# Patient Record
Sex: FEMALE | Race: WHITE | NOT HISPANIC OR LATINO | Employment: FULL TIME | URBAN - METROPOLITAN AREA
[De-identification: names, ages, dates, MRNs, and addresses within clinical notes are randomized per-mention and may not be internally consistent; named-entity substitution may affect disease eponyms.]

---

## 2018-12-17 ENCOUNTER — HOSPITAL ENCOUNTER (OUTPATIENT)
Dept: URGENT CARE | Facility: CLINIC | Age: 43
Setting detail: SPECIMEN
Discharge: HOME | End: 2018-12-17
Attending: FAMILY MEDICINE | Admitting: FAMILY MEDICINE

## 2018-12-17 LAB
AMPICILLIN SUSC ISLT: ABNORMAL
AZTREONAM SUSC ISLT: ABNORMAL
BACTERIA ISLT: ABNORMAL
BACTERIA SPEC AEROBE CULT: ABNORMAL
CEFAZOLIN SUSC ISLT: ABNORMAL
CEFEPIME SUSC ISLT: ABNORMAL
CEFTRIAXONE SUSC ISLT: ABNORMAL
CIPROFLOXACIN SUSC ISLT: ABNORMAL
COLONY COUNT: ABNORMAL
LEVOFLOXACIN SUSC ISLT: ABNORMAL
Lab: ABNORMAL
MEROPENEM SUSC ISLT: ABNORMAL
MICRO REPORT STATUS: ABNORMAL
NITROFURANTOIN SUSC ISLT: ABNORMAL
PIP+TAZO SUSC ISLT: ABNORMAL
SPECIMEN SOURCE: ABNORMAL
SUSC METH SPEC: ABNORMAL
TETRACYCLINE SUSC ISLT: ABNORMAL
TOBRAMYCIN SUSC ISLT: ABNORMAL
TRIMETHOPRIM/SULFA: ABNORMAL

## 2019-07-05 ENCOUNTER — OFFICE (AMBULATORY)
Dept: URBAN - METROPOLITAN AREA CLINIC 64 | Facility: CLINIC | Age: 44
End: 2019-07-05

## 2019-07-05 VITALS
WEIGHT: 143 LBS | SYSTOLIC BLOOD PRESSURE: 99 MMHG | HEIGHT: 65 IN | HEART RATE: 83 BPM | DIASTOLIC BLOOD PRESSURE: 65 MMHG

## 2019-07-05 DIAGNOSIS — Z12.11 ENCOUNTER FOR SCREENING FOR MALIGNANT NEOPLASM OF COLON: ICD-10-CM

## 2019-07-05 PROCEDURE — S0285 CNSLT BEFORE SCREEN COLONOSC: HCPCS | Performed by: NURSE PRACTITIONER

## 2019-08-09 ENCOUNTER — OFFICE (AMBULATORY)
Dept: URBAN - METROPOLITAN AREA PATHOLOGY 4 | Facility: PATHOLOGY | Age: 44
End: 2019-08-09
Payer: COMMERCIAL

## 2019-08-09 ENCOUNTER — ON CAMPUS - OUTPATIENT (AMBULATORY)
Dept: URBAN - METROPOLITAN AREA HOSPITAL 2 | Facility: HOSPITAL | Age: 44
End: 2019-08-09
Payer: COMMERCIAL

## 2019-08-09 VITALS
SYSTOLIC BLOOD PRESSURE: 113 MMHG | SYSTOLIC BLOOD PRESSURE: 91 MMHG | RESPIRATION RATE: 14 BRPM | HEART RATE: 85 BPM | HEART RATE: 77 BPM | SYSTOLIC BLOOD PRESSURE: 105 MMHG | HEART RATE: 83 BPM | SYSTOLIC BLOOD PRESSURE: 106 MMHG | HEART RATE: 80 BPM | SYSTOLIC BLOOD PRESSURE: 90 MMHG | SYSTOLIC BLOOD PRESSURE: 119 MMHG | OXYGEN SATURATION: 99 % | HEART RATE: 71 BPM | OXYGEN SATURATION: 98 % | HEART RATE: 81 BPM | OXYGEN SATURATION: 100 % | RESPIRATION RATE: 18 BRPM | HEART RATE: 75 BPM | DIASTOLIC BLOOD PRESSURE: 66 MMHG | TEMPERATURE: 97.7 F | SYSTOLIC BLOOD PRESSURE: 100 MMHG | DIASTOLIC BLOOD PRESSURE: 59 MMHG | DIASTOLIC BLOOD PRESSURE: 65 MMHG | HEIGHT: 65 IN | SYSTOLIC BLOOD PRESSURE: 93 MMHG | HEART RATE: 76 BPM | OXYGEN SATURATION: 96 % | DIASTOLIC BLOOD PRESSURE: 81 MMHG | HEART RATE: 91 BPM | DIASTOLIC BLOOD PRESSURE: 87 MMHG | RESPIRATION RATE: 17 BRPM | WEIGHT: 142 LBS | DIASTOLIC BLOOD PRESSURE: 70 MMHG | DIASTOLIC BLOOD PRESSURE: 64 MMHG

## 2019-08-09 DIAGNOSIS — D12.0 BENIGN NEOPLASM OF CECUM: ICD-10-CM

## 2019-08-09 DIAGNOSIS — Z12.11 ENCOUNTER FOR SCREENING FOR MALIGNANT NEOPLASM OF COLON: ICD-10-CM

## 2019-08-09 LAB
GI HISTOLOGY: A. UNSPECIFIED: (no result)
GI HISTOLOGY: PDF REPORT: (no result)

## 2019-08-09 PROCEDURE — 88305 TISSUE EXAM BY PATHOLOGIST: CPT | Mod: 33 | Performed by: INTERNAL MEDICINE

## 2019-08-09 PROCEDURE — 45385 COLONOSCOPY W/LESION REMOVAL: CPT | Mod: 33 | Performed by: INTERNAL MEDICINE

## 2020-05-20 ENCOUNTER — OFFICE VISIT (OUTPATIENT)
Dept: ORTHOPEDIC SURGERY | Facility: CLINIC | Age: 45
End: 2020-05-20

## 2020-05-20 VITALS
DIASTOLIC BLOOD PRESSURE: 72 MMHG | SYSTOLIC BLOOD PRESSURE: 106 MMHG | BODY MASS INDEX: 24.32 KG/M2 | WEIGHT: 146 LBS | TEMPERATURE: 97.2 F | HEIGHT: 65 IN | HEART RATE: 93 BPM

## 2020-05-20 DIAGNOSIS — M25.511 ACUTE PAIN OF RIGHT SHOULDER: Primary | ICD-10-CM

## 2020-05-20 PROCEDURE — 99213 OFFICE O/P EST LOW 20 MIN: CPT | Performed by: ORTHOPAEDIC SURGERY

## 2020-05-20 PROCEDURE — 20610 DRAIN/INJ JOINT/BURSA W/O US: CPT | Performed by: ORTHOPAEDIC SURGERY

## 2020-05-20 RX ORDER — TRIAMCINOLONE ACETONIDE 40 MG/ML
40 INJECTION, SUSPENSION INTRA-ARTICULAR; INTRAMUSCULAR ONCE
Status: COMPLETED | OUTPATIENT
Start: 2020-05-20 | End: 2020-05-20

## 2020-05-20 RX ORDER — ESCITALOPRAM OXALATE 20 MG/1
20 TABLET ORAL DAILY
COMMUNITY
Start: 2020-04-27

## 2020-05-20 RX ADMIN — TRIAMCINOLONE ACETONIDE 40 MG: 40 INJECTION, SUSPENSION INTRA-ARTICULAR; INTRAMUSCULAR at 10:08

## 2020-05-20 NOTE — PROGRESS NOTES
"     Patient ID: Ara Diamond is a 45 y.o. female.  Right shoulder pain  Sharon is a 45-year-old female here with about a month of increasing right shoulder pain.  She has had to lift her grandchild a lot causing pain in the impingement area.  There is minimal weakness.  Pain is worse with lifting overhead and at night.  Is not much better with oral anti-inflammatories, is sharp and a 6/10    Review of Systems:    Right shoulder pain  Denies chest pain    Objective:    /72   Pulse 93   Temp 97.2 °F (36.2 °C)   Ht 165.1 cm (65\")   Wt 66.2 kg (146 lb)   BMI 24.30 kg/m²     Physical Examination:     Well-developed, well-nourished, in no acute distress; alert and oriented x 3.       Shoulder Exam:     Right shoulder demonstrates no scars and no atrophy. There is mild tenderness in the impingement area. Passive elevation is 180°, abduction 140°, external rotation 40°, internal rotation L4. Speed and [place] are positive for mild pain. Supraspinatus is   negative. Supine abduction external and rotation is 90/90. Belly press and liftoff are  5/5. Impingement signs are mildly positive.  Sensory and motor exam are intact all distributions.  Radial pulse is palpable and capillary refill is less than two seconds to   all digits    Imaging:       Assessment:    Right shoulder bursitis    Plan:  Treatment options again discussed.I recommend injection after todays evaluation.  Risks and benefits were discussed. Under sterile technique and written consent I injected 40mg of Kenalog and 1cc of 1% Lidocaine plain into the subacromial space. It was well tolerated.          Disclaimer: Please note that areas of this note were completed with computer voice recognition software.  Quite often unanticipated grammatical, syntax, homophones, and other interpretive errors are inadvertently transcribed by the computer software. Please excuse any errors that have escaped final proofreading.  "

## 2020-06-25 ENCOUNTER — OFFICE VISIT (OUTPATIENT)
Dept: ORTHOPEDIC SURGERY | Facility: CLINIC | Age: 45
End: 2020-06-25

## 2020-06-25 VITALS
SYSTOLIC BLOOD PRESSURE: 110 MMHG | BODY MASS INDEX: 23.99 KG/M2 | WEIGHT: 144 LBS | DIASTOLIC BLOOD PRESSURE: 74 MMHG | HEART RATE: 82 BPM | HEIGHT: 65 IN

## 2020-06-25 DIAGNOSIS — G56.01 RIGHT CARPAL TUNNEL SYNDROME: Primary | ICD-10-CM

## 2020-06-25 PROCEDURE — 99213 OFFICE O/P EST LOW 20 MIN: CPT | Performed by: ORTHOPAEDIC SURGERY

## 2020-06-25 RX ORDER — HYDROXYZINE HYDROCHLORIDE 10 MG/1
TABLET, FILM COATED ORAL
COMMUNITY
Start: 2020-05-19

## 2020-06-25 NOTE — PROGRESS NOTES
"     Patient ID: Ara Diamond is a 45 y.o. female.    Chief Complaint:    Chief Complaint   Patient presents with   • Right Shoulder - Follow-up   • Right Hand - Follow-up       HPI:  Sharon is a 45-year-old female here with about 2 to 3 weeks of right hand tingling and numbness.  There is no injury.  She does a lot of repetitive gripping and lifting during the day.  She has tingling mainly in the thenar aspect of the hand.  There are no neck complaints.  She has been using a night splint which has helped in the last week or so.  Past Medical History:   Diagnosis Date   • Anxiety        History reviewed. No pertinent surgical history.    History reviewed. No pertinent family history.       Social History     Occupational History   • Not on file   Tobacco Use   • Smoking status: Former Smoker     Years: 6.00   • Smokeless tobacco: Never Used   Substance and Sexual Activity   • Alcohol use: No     Frequency: Never   • Drug use: No   • Sexual activity: Not on file      Review of Systems   Cardiovascular: Negative for chest pain.   Musculoskeletal: Positive for arthralgias.       Objective:    /74   Pulse 82   Ht 165.1 cm (65\")   Wt 65.3 kg (144 lb)   BMI 23.96 kg/m²     Physical Examination:  She is a pleasant female in no distress. She is alert and oriented x3 and appears her stated age.  Cervical examination is benign.  The hand demonstrates no scars and no atrophy.  There are no areas of tenderness.  She states there is mild tingling to the tip of the thumb otherwise sensation and motor and capillary refill are intact with a negative Tinel, Phalen, and compression test at the carpal tunnel    Imaging:      Assessment:  Carpal tunnel syndrome right side mild    Plan:  Recommend continued conservative management night splinting, good ergonomics and carpal tunnel exercises.  If it does not improve contact me for EMG          Disclaimer: Please note that areas of this note were completed with computer " voice recognition software.  Quite often unanticipated grammatical, syntax, homophones, and other interpretive errors are inadvertently transcribed by the computer software. Please excuse any errors that have escaped final proofreading.

## 2021-01-11 ENCOUNTER — OFFICE VISIT (OUTPATIENT)
Dept: ORTHOPEDIC SURGERY | Facility: CLINIC | Age: 46
End: 2021-01-11

## 2021-01-11 VITALS
SYSTOLIC BLOOD PRESSURE: 109 MMHG | RESPIRATION RATE: 20 BRPM | DIASTOLIC BLOOD PRESSURE: 77 MMHG | OXYGEN SATURATION: 97 % | HEIGHT: 65 IN | HEART RATE: 80 BPM | WEIGHT: 147.8 LBS | BODY MASS INDEX: 24.62 KG/M2

## 2021-01-11 DIAGNOSIS — M25.512 LEFT SHOULDER PAIN, UNSPECIFIED CHRONICITY: ICD-10-CM

## 2021-01-11 DIAGNOSIS — M25.512 ACUTE PAIN OF LEFT SHOULDER: Primary | ICD-10-CM

## 2021-01-11 PROCEDURE — 20610 DRAIN/INJ JOINT/BURSA W/O US: CPT | Performed by: ORTHOPAEDIC SURGERY

## 2021-01-11 PROCEDURE — 99213 OFFICE O/P EST LOW 20 MIN: CPT | Performed by: ORTHOPAEDIC SURGERY

## 2021-01-11 RX ORDER — TRIAMCINOLONE ACETONIDE 40 MG/ML
40 INJECTION, SUSPENSION INTRA-ARTICULAR; INTRAMUSCULAR ONCE
Status: COMPLETED | OUTPATIENT
Start: 2021-01-11 | End: 2021-01-11

## 2021-01-11 RX ADMIN — TRIAMCINOLONE ACETONIDE 40 MG: 40 INJECTION, SUSPENSION INTRA-ARTICULAR; INTRAMUSCULAR at 08:21

## 2021-01-11 NOTE — PROGRESS NOTES
"     Patient ID: Ara Diamond is a 46 y.o. female.    Chief Complaint:    Chief Complaint   Patient presents with   • Left Shoulder - Pain       HPI:  Ara is a 46-year-old female here with about a month or 2 of left shoulder pain.  There is no injury.  Most of the pain is in the impingement area worse at night and with overhead activity.  She has taken ibuprofen with mild relief.  Pain is dull and a 5/10  Past Medical History:   Diagnosis Date   • Anxiety        History reviewed. No pertinent surgical history.    History reviewed. No pertinent family history.       Social History     Occupational History   • Not on file   Tobacco Use   • Smoking status: Former Smoker     Packs/day: 1.00     Years: 15.00     Pack years: 15.00     Types: Cigarettes     Quit date: 1/11/2011     Years since quitting: 10.0   • Smokeless tobacco: Never Used   Substance and Sexual Activity   • Alcohol use: No     Frequency: Never   • Drug use: No   • Sexual activity: Defer      Review of Systems   Cardiovascular: Negative for chest pain.   Musculoskeletal: Positive for arthralgias.       Objective:    /77   Pulse 80   Resp 20   Ht 165.1 cm (65\")   Wt 67 kg (147 lb 12.8 oz)   LMP 12/19/2020   SpO2 97%   BMI 24.60 kg/m²     Physical Examination:  She is a pleasant female in no distress. She is alert and oriented x3 and appears her stated age.  Left shoulder demonstrates intact skin with mild pain in the impingement area.  Passive elevation is 160 degrees abduction 130 degrees external rotation 50 degrees internal rotation L5.  She has mild pain but no weakness on supraspinatus testing.  Belly press and liftoff are 5/5.  Impingement signs are mildly positive.Sensory and motor exam are intact all distributions. Radial pulse is palpable and capillary refill is less than two seconds to all digits    Imaging:  left Shoulder X-Ray  Indication: Chronic shoulder pain no injury  AP Y and Lateral views  Findings: Well-maintained " joint spaces no impingement  no bony lesion  Soft tissues normal  normal joint spaces  Hardware appropriately positioned not applicable      no prior studies available for comparison    Assessment:  Left shoulder pain    Plan:  Treatment options discussed,I recommend injection after todays evaluation.  Risks and benefits were discussed. Under sterile technique and written consent I injected 40mg of Kenalog and 1cc of 1% Lidocaine plain into the subacromial space. It was well tolerated.      Procedures         Disclaimer: Please note that areas of this note were completed with computer voice recognition software.  Quite often unanticipated grammatical, syntax, homophones, and other interpretive errors are inadvertently transcribed by the computer software. Please excuse any errors that have escaped final proofreading.

## 2021-02-09 PROCEDURE — U0003 INFECTIOUS AGENT DETECTION BY NUCLEIC ACID (DNA OR RNA); SEVERE ACUTE RESPIRATORY SYNDROME CORONAVIRUS 2 (SARS-COV-2) (CORONAVIRUS DISEASE [COVID-19]), AMPLIFIED PROBE TECHNIQUE, MAKING USE OF HIGH THROUGHPUT TECHNOLOGIES AS DESCRIBED BY CMS-2020-01-R: HCPCS | Performed by: NURSE PRACTITIONER

## 2021-03-18 ENCOUNTER — OFFICE VISIT (OUTPATIENT)
Dept: ORTHOPEDIC SURGERY | Facility: CLINIC | Age: 46
End: 2021-03-18

## 2021-03-18 VITALS
DIASTOLIC BLOOD PRESSURE: 74 MMHG | HEART RATE: 80 BPM | SYSTOLIC BLOOD PRESSURE: 105 MMHG | WEIGHT: 146 LBS | BODY MASS INDEX: 24.92 KG/M2 | HEIGHT: 64 IN

## 2021-03-18 DIAGNOSIS — M25.512 ACUTE PAIN OF LEFT SHOULDER: Primary | ICD-10-CM

## 2021-03-18 PROCEDURE — 99213 OFFICE O/P EST LOW 20 MIN: CPT | Performed by: ORTHOPAEDIC SURGERY

## 2021-03-18 NOTE — PROGRESS NOTES
"     Patient ID: Ara Diamond is a 46 y.o. female.  Left shoulder pain  Ara and is a 46-year-old female here with continued left shoulder pain.  She had cortisone injection in January of this year with minimal pain relief.  She does feel like her motion is improving.  Continues with pain over the impingement area and bicep    Review of Systems:    Left shoulder pain    Objective:    /74   Pulse 80   Ht 162.6 cm (64\")   Wt 66.2 kg (146 lb)   BMI 25.06 kg/m²     Physical Examination:      She is a pleasant female in no distress. She is alert and oriented x3 and appears her stated age.  Left shoulder demonstrates intact skin with mild pain in the impingement area.  Passive elevation is 180 degrees abduction 140 degrees external rotation 50 degrees internal rotation L5.  She has mild pain but no weakness on supraspinatus testing.  Belly press and liftoff are 5/5.  Impingement signs are mildly positive.Sensory and motor exam are intact all distributions. Radial pulse is palpable and capillary refill is less than two seconds to all digits    Imaging:       Assessment:    Continued left shoulder pain    Plan:   I recommend MRI to evaluate her rotator cuff      Procedures          Disclaimer: Please note that areas of this note were completed with computer voice recognition software.  Quite often unanticipated grammatical, syntax, homophones, and other interpretive errors are inadvertently transcribed by the computer software. Please excuse any errors that have escaped final proofreading.  "

## 2021-03-18 NOTE — PATIENT INSTRUCTIONS
MRI follow-up instructions    Today at your office visit, Dr. Tabor recommended an MRI (magnetic resonance imaging) to evaluate your joint pain.  This requires a precertification process, which our office will do, and then we will contact you when it is approved and go over scheduling options.  We typically recommend these to be performed at [facility] or [facility].  If for some reason it is performed elsewhere please arrange to have that facility give you a disc with your images on it so Dr. Tabor can review it at your follow-up visit.    When checking out today we recommend making an appointment to go over your results in approximately two weeks.  If your MRI is done sooner than that we would be happy to schedule you sooner to go over your results, just contact us at [phone] or through the [facility] portal to let us know your MRI is completed.  Seeing you in person for the results gives us the best opportunity to look at your images together and explain the diagnosis and treatment options to best help you.

## 2021-03-25 ENCOUNTER — HOSPITAL ENCOUNTER (OUTPATIENT)
Dept: MRI IMAGING | Facility: HOSPITAL | Age: 46
Discharge: HOME | End: 2021-03-25
Admitting: ORTHOPAEDIC SURGERY

## 2021-03-25 DIAGNOSIS — M25.512 ACUTE PAIN OF LEFT SHOULDER: ICD-10-CM

## 2021-03-25 PROCEDURE — 73221 MRI JOINT UPR EXTREM W/O DYE: CPT

## 2021-04-01 ENCOUNTER — OFFICE VISIT (OUTPATIENT)
Dept: ORTHOPEDIC SURGERY | Facility: CLINIC | Age: 46
End: 2021-04-01

## 2021-04-01 VITALS
BODY MASS INDEX: 24.92 KG/M2 | HEART RATE: 77 BPM | WEIGHT: 146 LBS | DIASTOLIC BLOOD PRESSURE: 64 MMHG | SYSTOLIC BLOOD PRESSURE: 89 MMHG | HEIGHT: 64 IN

## 2021-04-01 DIAGNOSIS — M25.512 ACUTE PAIN OF LEFT SHOULDER: Primary | ICD-10-CM

## 2021-04-01 PROCEDURE — 20610 DRAIN/INJ JOINT/BURSA W/O US: CPT | Performed by: ORTHOPAEDIC SURGERY

## 2021-04-01 PROCEDURE — 99213 OFFICE O/P EST LOW 20 MIN: CPT | Performed by: ORTHOPAEDIC SURGERY

## 2021-04-01 RX ORDER — TRIAMCINOLONE ACETONIDE 40 MG/ML
40 INJECTION, SUSPENSION INTRA-ARTICULAR; INTRAMUSCULAR ONCE
Status: COMPLETED | OUTPATIENT
Start: 2021-04-01 | End: 2021-04-01

## 2021-04-01 RX ADMIN — TRIAMCINOLONE ACETONIDE 40 MG: 40 INJECTION, SUSPENSION INTRA-ARTICULAR; INTRAMUSCULAR at 08:56

## 2021-04-01 NOTE — PROGRESS NOTES
Patient ID: Ara Diamond is a 46 y.o. female.  Left shoulder pain  Sharon returns with continued left shoulder pain mainly in the posterior aspect of the shoulder and impingement area, pain is dull and a 4/10 worse at night despite oral medication    Review of Systems:  Left shoulder pain      Objective:    There were no vitals taken for this visit.    Physical Examination:      She is a pleasant female in no distress. She is alert and oriented x3 and appears her stated age.  Left shoulder demonstrates intact skin with mild pain in the impingement area.  Passive elevation is 180 degrees abduction 140 degrees external rotation 50 degrees internal rotation L5.  She has mild pain but no weakness on supraspinatus testing.  Belly press and liftoff are 5/5.  Impingement signs are mildly positive.Sensory and motor exam are intact all distributions. Radial pulse is palpable and capillary refill is less than two seconds to all digits    Imaging:   MRI demonstrates mild bursitis and edema of the cuff muscle but overall no internal derangement    Assessment:    Left shoulder bursitis    Plan:   Treatment options again discussed. I recommend injection after todays evaluation.  Risks and benefits were discussed. Under sterile technique and written consent I injected 40mg of Kenalog and 1cc of 1% Lidocaine plain into the subacromial space. It was well tolerated.  See me as needed      Procedures          Disclaimer: Please note that areas of this note were completed with computer voice recognition software.  Quite often unanticipated grammatical, syntax, homophones, and other interpretive errors are inadvertently transcribed by the computer software. Please excuse any errors that have escaped final proofreading.

## 2021-08-30 ENCOUNTER — OFFICE VISIT (OUTPATIENT)
Dept: ORTHOPEDIC SURGERY | Facility: CLINIC | Age: 46
End: 2021-08-30

## 2021-08-30 VITALS
HEART RATE: 79 BPM | BODY MASS INDEX: 24.92 KG/M2 | DIASTOLIC BLOOD PRESSURE: 75 MMHG | HEIGHT: 64 IN | WEIGHT: 146 LBS | SYSTOLIC BLOOD PRESSURE: 107 MMHG

## 2021-08-30 DIAGNOSIS — M25.512 ACUTE PAIN OF LEFT SHOULDER: Primary | ICD-10-CM

## 2021-08-30 PROCEDURE — 20610 DRAIN/INJ JOINT/BURSA W/O US: CPT | Performed by: ORTHOPAEDIC SURGERY

## 2021-08-30 RX ORDER — ACYCLOVIR 400 MG/1
TABLET ORAL
COMMUNITY
Start: 2021-06-14

## 2021-08-30 RX ORDER — PROMETHAZINE HYDROCHLORIDE 25 MG/1
25 TABLET ORAL
COMMUNITY
Start: 2021-08-20 | End: 2021-12-01 | Stop reason: HOSPADM

## 2021-08-30 RX ORDER — CETIRIZINE HYDROCHLORIDE 10 MG/1
TABLET ORAL DAILY PRN
COMMUNITY
Start: 2021-06-15

## 2021-08-30 RX ORDER — TRIAMCINOLONE ACETONIDE 40 MG/ML
40 INJECTION, SUSPENSION INTRA-ARTICULAR; INTRAMUSCULAR ONCE
Status: COMPLETED | OUTPATIENT
Start: 2021-08-30 | End: 2021-08-30

## 2021-08-30 RX ORDER — ZOLMITRIPTAN 5 MG/1
5 TABLET, FILM COATED ORAL AS NEEDED
COMMUNITY
Start: 2021-06-28

## 2021-08-30 RX ADMIN — TRIAMCINOLONE ACETONIDE 40 MG: 40 INJECTION, SUSPENSION INTRA-ARTICULAR; INTRAMUSCULAR at 08:36

## 2021-08-30 NOTE — PROGRESS NOTES
"     Patient ID: Ara Diamond is a 46 y.o. female.  Left shoulder pain  Ara hale returns with continued left shoulder pain.  Has had an injection in January and April of this year, most recent one only lasted a couple months    Review of Systems:  Left shoulder pain      Objective:    /75   Pulse 79   Ht 162.6 cm (64\")   Wt 66.2 kg (146 lb)   BMI 25.06 kg/m²     Physical Examination:  Left shoulder demonstrates intact skin with mild swelling.  No redness.  Passive elevation 180 abduction 150 external rotation 40 internal rotation L5 with mild pain but no weakness on Speed [place] supraspinatus testing with positive impingement sign       Imaging:       Assessment:    Left shoulder pain    Plan:   Further treatment options discussed.  She would like to try one further injection, I recommend injection after todays evaluation.  Risks and benefits were discussed. Under sterile technique and written consent I injected 40 mg of Kenalog and 1 mL of 1% Lidocaine plain into the subacromial space. It was well tolerated.      Procedures          Disclaimer: Please note that areas of this note were completed with computer voice recognition software.  Quite often unanticipated grammatical, syntax, homophones, and other interpretive errors are inadvertently transcribed by the computer software. Please excuse any errors that have escaped final proofreading.  "

## 2021-11-08 ENCOUNTER — TELEPHONE (OUTPATIENT)
Dept: SURGERY | Facility: CLINIC | Age: 46
End: 2021-11-08

## 2021-11-08 NOTE — TELEPHONE ENCOUNTER
Patient requesting second opinion with breast surgeon. I had to leave a message for her to call me back.    Need to know what current diagnosis is, reason for consult,   Has she already seen a breast surgeon and if so who,   Where are her prior breast images, where has she had her mammogram, breast biopsies,   Any breast surgeries?

## 2021-11-08 NOTE — TELEPHONE ENCOUNTER
Dr. Whitten's office called to let us know they will not send records without patient written authorization.     I've asked patient to provide this so we may get Dr. Whitten's office note

## 2021-11-10 ENCOUNTER — LAB REQUISITION (OUTPATIENT)
Dept: LAB | Facility: HOSPITAL | Age: 46
End: 2021-11-10

## 2021-11-10 ENCOUNTER — TELEPHONE (OUTPATIENT)
Dept: SURGERY | Facility: CLINIC | Age: 46
End: 2021-11-10

## 2021-11-10 DIAGNOSIS — Z00.00 ENCOUNTER FOR GENERAL ADULT MEDICAL EXAMINATION WITHOUT ABNORMAL FINDINGS: ICD-10-CM

## 2021-11-10 PROCEDURE — 88360 TUMOR IMMUNOHISTOCHEM/MANUAL: CPT | Performed by: SURGERY

## 2021-11-10 NOTE — TELEPHONE ENCOUNTER
New patient appointment with Dr. Stovall is scheduled on 12/1/2021 @ 9:00am.    Called and spoke to patient, patient expressed v/u of appointment time.    Sent patient a reminder letter in the mail.

## 2021-11-11 LAB
LAB AP CASE REPORT: NORMAL
PATH REPORT.FINAL DX SPEC: NORMAL
PATH REPORT.GROSS SPEC: NORMAL

## 2021-11-16 ENCOUNTER — TELEPHONE (OUTPATIENT)
Dept: SURGERY | Facility: CLINIC | Age: 46
End: 2021-11-16

## 2021-11-16 NOTE — TELEPHONE ENCOUNTER
Breast MRI is scheduled on 11/29/2021 @ 10:45am, patient arrival 10:15am.    Called patient, vm was full.    I will try to call patient back.

## 2021-11-29 ENCOUNTER — HOSPITAL ENCOUNTER (OUTPATIENT)
Dept: MRI IMAGING | Facility: HOSPITAL | Age: 46
Discharge: HOME | End: 2021-11-29
Admitting: SURGERY

## 2021-11-29 DIAGNOSIS — D05.11 DUCTAL CARCINOMA IN SITU (DCIS) OF RIGHT BREAST: ICD-10-CM

## 2021-11-29 PROCEDURE — 77049 MRI BREAST C-+ W/CAD BI: CPT

## 2021-11-29 PROCEDURE — 0 GADOBENATE DIMEGLUMINE 529 MG/ML SOLUTION: Performed by: SURGERY

## 2021-11-29 PROCEDURE — A9577 INJ MULTIHANCE: HCPCS | Performed by: SURGERY

## 2021-11-29 RX ADMIN — GADOBENATE DIMEGLUMINE 13 ML: 529 INJECTION, SOLUTION INTRAVENOUS at 10:40

## 2021-12-01 ENCOUNTER — PREP FOR SURGERY (OUTPATIENT)
Dept: OTHER | Facility: HOSPITAL | Age: 46
End: 2021-12-01

## 2021-12-01 ENCOUNTER — OFFICE VISIT (OUTPATIENT)
Dept: SURGERY | Facility: CLINIC | Age: 46
End: 2021-12-01

## 2021-12-01 VITALS
HEIGHT: 64 IN | SYSTOLIC BLOOD PRESSURE: 110 MMHG | OXYGEN SATURATION: 98 % | BODY MASS INDEX: 24.75 KG/M2 | DIASTOLIC BLOOD PRESSURE: 74 MMHG | WEIGHT: 145 LBS | RESPIRATION RATE: 17 BRPM | HEART RATE: 83 BPM

## 2021-12-01 DIAGNOSIS — R92.8 ABNORMAL FINDINGS ON DIAGNOSTIC IMAGING OF BREAST: Primary | ICD-10-CM

## 2021-12-01 DIAGNOSIS — D05.11 DUCTAL CARCINOMA IN SITU (DCIS) OF RIGHT BREAST: Primary | ICD-10-CM

## 2021-12-01 PROCEDURE — 99205 OFFICE O/P NEW HI 60 MIN: CPT | Performed by: SURGERY

## 2021-12-01 PROCEDURE — 99417 PROLNG OP E/M EACH 15 MIN: CPT | Performed by: SURGERY

## 2021-12-01 NOTE — PROGRESS NOTES
[facility]     Referring Provider: Self Referring     Chief complaint: Newly diagnosed DCIS     HPI: Ms. 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 This was initially detected as an imaging abnormality on routine screening. Her work-up is detailed in the oncologic history below. Prior to the biopsy, she denies any breast lumps, pain, skin changes, or nipple discharge. She denies any prior history of abnormal mammograms or breast biopsies. She denies any family history of breast cancer. She has a family history of ovarian cancer in her maternal grandmother and pancreatic cancer in her maternal great grandmother. She was joined today in clinic by her . She gave consent for him to be present during her examination and participate in the discussion.       Oncology/Hematology History Overview Note   09/09/2020, Screening MMG with Jose ([facility]):  Heterogeneously dense. No dominant masses, suspicious microcalcifications or architectural distortions are identified in either breast. Stable exam without mammographic signs of malignancy.  BI-RADS 1: Negative.     Ductal carcinoma in situ (DCIS) of right breast   9/12/2021 Initial Diagnosis    Ductal carcinoma in situ (DCIS) of right breast     9/13/2021 Imaging    Screening MMG with Jose ([facility]):  Heterogeneously dense. There is a group of calcifications within the upper outer quadrant of the right breast. No additional suspicious calcifications, architectural distortions or mass lesions identified.  BI-RADS 0: Incomplete.     9/24/2021 Imaging    Right Diagnostic MMG with Jose ([facility]):  As noted on the screening mammogram, there are calcifications at the upper aspect of the right breast. The calcifications demonstrate a somewhat amorphous appearance in the CC projection. On the 90 degree view, the  majority of the calcifications demonstrate evidence for layering indicating milk of calcium as a benign entity. However, there is a small cluster of calcifications noted at the posterior upper outer aspect of the right breast. This cluster of calcifications does not demonstrate evidence for layering. The findings indicate a suspicious cluster of calcifications. This cluster of calcifications is marked on the images. No suspicious masses or architectural distortions are observed.  BI-RADS 4: Suspicious.     10/28/2021 Biopsy    Right Breast, Stereotactic Biopsy ([facility]):    Right breast, stereotactic biopsy:  1. Ductal carcinoma in situ, cribriform type.  2. Nuclear grade 2 of 3.  3. Maximal dimension of DCIS is 0.3 cm.  4. Central comedo necrosis and calcifications are present.  5. No invasive carcinoma or lymphovascular invasion is identified.  6. The non-neoplastic breast shows fibrous mastopathy, apocrine hyperplasia and mild duct ectasia.    Comment - Two portions of tissue show ductal carcinoma in situ, cribriform type with central necrosis and calcifications.    ER+ (100%, strong)  RI+ (99%, strong)      [facility] PATHOLOGY REVIEW    1. Right Breast, Stereotactic Biopsy:               A. Ductal carcinoma in situ (DCIS):                            1. Low to intermediate grade cribriform DCIS with focal comedo type necrosis.                            2. DCIS focally present measuring up to at least 3 mm in greatest dimension.                            3. Microcalcification present within DCIS.                            4. Biomarkers ([facility]):                                         Estrogen Receptor: Positive (100%, Douglas 8/8)                                         Progesterone Receptor: Positive (98%, Douglas 7/8)                                         Ki67 ([facility] performed in House) =  5%               B. No invasive carcinoma identified.     Comment: We essentially concur with  the pathologist's original submitted diagnosis of intermediate grade cribriform ductal carcinoma in situ with focal comedo type necrosis and microcalcification.  Immunostain for CK5/6 performed at the submitting institution is reviewed demonstrating an intact myoepithelial layer in areas of concern.  The submitted paraffin block will be used for one H&E recut plus immunostain for Ki-67 reported above.  All slides prepared at the submitting institution in addition to their paraffin block will be returned.  Slides performed in house will be retained on file for comparison with the anticipated resection specimen.  This case was shared in internal consultation with Dr. Rogel, who concurred with the above diagnosis.     11/29/2021 Imaging    Bilateral Breast MRI ([facility]):  RIGHT BREAST:    At 11:00, 5.2 cm posterior to the nipple, there is a 0.7 cm AP dimension, 2.2 cm transverse dimension, 1.7 cm craniocaudal dimension peripherally enhancing biopsy cavity/tract with a central focus of susceptibility from a biopsy clip marking the site of biopsy-proven malignancy. No suspicious mass or nonmass enhancement is identified at the biopsy site.  At 6:00 in the posterior right breast, 6.2 cm posterior to the nipple, there is a 0.9 cm AP dimension, 0.8 cm transverse dimension, 0.5 cm craniocaudal dimension focal area of clumped nonmass enhancement, which  is suspicious.  No suspicious enhancement is identified in the right nipple or chest wall.  The visualized axilla is within normal limits.    LEFT BREAST:    At 3:00 in the anterior left breast, 3.5 cm posterior to the nipple, there is a 1.0 cm AP dimension, 1.1 cm transverse dimension, 0.7 cm craniocaudal dimension enhancing mass with irregular margins, which is suspicious.  No suspicious enhancement is identified in the left nipple or chest wall.  The visualized axilla is within normal limits.   EXTRAMAMMARY FINDINGS:   There are no abnormally enlarged internal mammary chain  lymph nodes on either side.  BI-RADS 4: Suspicious.         Review of Systems   Constitutional: Negative for appetite change, chills, diaphoresis, fatigue, fever and unexpected weight change.   HENT:   Negative for hearing loss, lump/mass, mouth sores, nosebleeds, sore throat, tinnitus, trouble swallowing and voice change.    Eyes: Negative for eye problems and icterus.   Respiratory: Negative for chest tightness, cough, hemoptysis, shortness of breath and wheezing.    Cardiovascular: Negative for chest pain, leg swelling and palpitations.   Gastrointestinal: Negative for abdominal distention, abdominal pain, blood in stool, constipation, diarrhea, nausea, rectal pain and vomiting.   Endocrine: Negative for hot flashes.   Genitourinary: Negative for bladder incontinence, difficulty urinating, dyspareunia, dysuria, frequency, hematuria, menstrual problem, nocturia, pelvic pain, vaginal bleeding and vaginal discharge.    Musculoskeletal: Negative for arthralgias, back pain, flank pain, gait problem, myalgias, neck pain and neck stiffness.   Skin: Negative for itching, rash and wound.   Neurological: Positive for headaches. Negative for dizziness, extremity weakness, gait problem, light-headedness, numbness, seizures and speech difficulty.   Hematological: Negative for adenopathy. Does not bruise/bleed easily.   Psychiatric/Behavioral: Negative for confusion, decreased concentration, depression, sleep disturbance and suicidal ideas. The patient is nervous/anxious.    I personally reviewed and updated the ROS.      Medications:    Current Outpatient Medications:   •  acyclovir (ZOVIRAX) 400 MG tablet, TAKE 1 TABLET BY MOUTH TWICE DAILY FOR 5 DAYS AS NEEDED, Disp: , Rfl:   •  cetirizine (zyrTEC) 10 MG tablet, TAKE ONE TABLET BY MOUTH EVERY DAY FOR ITCHING, Disp: , Rfl:   •  escitalopram (LEXAPRO) 20 MG tablet, Take 20 mg by mouth Daily., Disp: , Rfl:   •  hydrOXYzine (ATARAX) 10 MG tablet, TK 1/2 TO 1 T PO Q 6 H PRA,  Disp: , Rfl:   •  loratadine-pseudoephedrine (CLARITIN-D 24-hour)  MG per 24 hr tablet, TAKE 1 TABLET BY MOUTH EVERY DAY, Disp: , Rfl:   •  ZOLMitriptan (ZOMIG) 5 MG tablet, Take 5 mg by mouth., Disp: , Rfl:       Allergies   Allergen Reactions   • Penicillins Nausea Only       Past Medical History:   Diagnosis Date   • Anxiety    • Migraine    • Seasonal allergies        Past Surgical History:   Procedure Laterality Date   • ABDOMINOPLASTY     • OTHER SURGICAL HISTORY      Arm Surgery (Skin Removal)       Family History   Problem Relation Age of Onset   • Ovarian cancer Maternal Grandmother 64   • Pancreatic cancer Maternal Great-Grandmother        Social History     Socioeconomic History   • Marital status:    • Number of children: 2   Tobacco Use   • Smoking status: Former Smoker     Packs/day: 1.00     Years: 15.00     Pack years: 15.00     Types: Cigarettes     Quit date:      Years since quittin.9   • Smokeless tobacco: Never Used   Vaping Use   • Vaping Use: Never used   Substance and Sexual Activity   • Alcohol use: No   • Drug use: No   • Sexual activity: Defer     Patient drinks 1-2 servings of caffeine per day.       GYNECOLOGIC HISTORY:   . P: 2. AB: 0.  Last menstrual period: 10/14/21  Age at menarche: 12  Age at first childbirth: 17  Lactation/How long: n/a  Age at menopause: Premenopausal  Total years of oral contraceptive use: 10  Total years of hormone replacement therapy: 0      PHYSICAL EXAMINATION:   Vitals:    21 0900   BP: 110/74   Pulse: 83   Resp: 17   SpO2: 98%     ECOG 0 - Asymptomatic  General: NAD, well appearing  Psych: a&o x 3, normal mood and affect  Eyes: EOMI, no scleral icterus  ENMT: neck supple without masses or thyromegaly, mucus membranes moist  Resp: normal effort, CTAB  CV: RRR, no murmurs, no edema  GI: soft, NT, ND  MSK: normal gait, normal ROM in bilateral shoulders  Lymph nodes: no cervical, supraclavicular or axillary  lymphadenopathy  Breast: symmetric, moderate size, grade 2 ptosis  Right: No visible abnormalities on inspection while seated, with arms raised or hands on hips. No masses, skin changes, or nipple abnormalities.  Left: No visible abnormalities on inspection while seated, with arms raised or hands on hips. No masses, skin changes, or nipple abnormalities.    Right breast, in-office ultrasound: At 11:30, 5 cm FN, there is a small postbiopsy hematoma with biopsy clip visualized. This is located about 5 mm deep to the skin.       I have independently reviewed her imaging and here are my findings:   In the right upper outer breast, there initially was a 10 mm cluster of calcifications. MRI shows a 2.2 cm biopsy cavity at this location without any residual suspicious enhancement.  In the right breast at 6:00, there is a 9 mm area of clumped non-mass enhancement on MRI.  In the left breast at 3:00, there is an 11 mm irregular enhancing mass on MRI.      Assessment:  46 y.o. 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    Discussion:  I had an extensive discussion with the patient and her  about the nature of her DCIS diagnosis and treatment options. We reviewed the components of breast tissue including ducts and lobules. We reviewed her pathology report in detail. We reviewed breast cancer histology, including stage, grade, receptors and how this applies to her diagnosis. We discussed the fact that we cannot accurately predict which patients with DCIS will progress to invasive cancer. We also discussed the fact that we cannot rule out current invasive cancer and that if there is invasive cancer found on final pathology, this would change the treatment plan.      We reviewed potential surgical treatments to include partial mastectomy versus mastectomy. We discussed the risks and benefits of both approaches. Regarding radiation therapy, we discussed that  radiation is indicated in all cases of breast conservation and in only limited circumstances following mastectomy. I explained that the primary goal of adjuvant radiation is to decrease the likelihood of local recurrence. Regarding systemic therapy, we discussed that chemotherapy is not indicated in the treatment of DCIS. We briefly discussed the use of endocrine therapy to decrease the likelihood of a local recurrence or a second primary tumor, but will refer her to medical oncology to discuss this postoperatively.    We discussed the additional bilateral breast findings seen on MRI. She will require bilateral breast MR biopsies. The results of these biopsies will significantly affect surgical decision making. If the biopsy on the right is positive, this would be multifocal disease, and I would recommend a mastectomy. If the biopsy on the left is positive, this would be a contralateral second primary breast cancer and I would recommend a bilateral mastectomy. If both biopsies are benign, she would be a very good candidate for breast conservation. She is not sure whether or not she would want breast conservation because she is concerned about receiving radiation.    We discussed that most breast cancer is not hereditary, however given her family history, this may play a role in her case. Genetic testing is warranted and was sent today. This could affect surgical decision making. Should the results show a pathologic mutation, I would recommend a mastectomy on the cancer side and a contralateral risk-reduction mastectomy. She understands that this would not be for the treatment of her right breast DCIS and will not improve her prognosis long term. This would be to reduce her risk of a second, primary breast cancer. If she is negative for a mutation (and the MR biopsies are benign), then I would not recommend a bilateral mastectomy, since her risk of a second primary cancer would be relatively low and endocrine therapy  will further reduce her risk.    We will go over axillary staging if necessary at her next appointment.    Plan:  A multidisciplinary plan has been formulated for the patient:      (1) Breast Surgical Oncology:  -Bilateral breast MR-guided biopsy  -F/u genetic testing. I will call her with results.  -Follow-up after the above have resulted to finalize the surgical plan.  -Plastic surgery referral at her next appointment.    (2) Medical Oncology:  -Will refer postoperatively.    (3) Radiation Oncology:  -Will refer postoperatively.    Enedina Stovall MD    I spent 90 minutes caring for rAa on this date of service. This time includes time spent by me in the following activities: preparing for the visit, performing a medically appropriate examination and/or evaluation , counseling and educating the patient/family/caregiver, ordering medications, tests, or procedures, documenting information in the medical record and independently interpreting results and communicating that information with the patient/family/caregiver.      CC:  RAOUL Torres PA

## 2021-12-08 ENCOUNTER — TELEPHONE (OUTPATIENT)
Dept: SURGERY | Facility: CLINIC | Age: 46
End: 2021-12-08

## 2021-12-08 NOTE — TELEPHONE ENCOUNTER
This has been called in left voicemail message. Valium 10 mg disp: 1 tablet, one po 1 hour prior to procedure. NR    CMA

## 2021-12-15 ENCOUNTER — HOSPITAL ENCOUNTER (OUTPATIENT)
Dept: MAMMOGRAPHY | Facility: HOSPITAL | Age: 46
Discharge: HOME | End: 2021-12-15

## 2021-12-15 ENCOUNTER — HOSPITAL ENCOUNTER (OUTPATIENT)
Dept: MRI IMAGING | Facility: HOSPITAL | Age: 46
Discharge: HOME | End: 2021-12-15

## 2021-12-15 VITALS
HEIGHT: 64 IN | TEMPERATURE: 97.8 F | BODY MASS INDEX: 24.75 KG/M2 | WEIGHT: 145 LBS | OXYGEN SATURATION: 99 % | SYSTOLIC BLOOD PRESSURE: 116 MMHG | RESPIRATION RATE: 16 BRPM | HEART RATE: 79 BPM | DIASTOLIC BLOOD PRESSURE: 73 MMHG

## 2021-12-15 DIAGNOSIS — R92.8 ABNORMAL FINDINGS ON DIAGNOSTIC IMAGING OF BREAST: ICD-10-CM

## 2021-12-15 DIAGNOSIS — Z98.890 STATUS POST BIOPSY: ICD-10-CM

## 2021-12-15 LAB — CREAT BLDA-MCNC: 0.6 MG/DL (ref 0.6–1.3)

## 2021-12-15 PROCEDURE — A4648 IMPLANTABLE TISSUE MARKER: HCPCS

## 2021-12-15 PROCEDURE — A9577 INJ MULTIHANCE: HCPCS | Performed by: SURGERY

## 2021-12-15 PROCEDURE — 77066 DX MAMMO INCL CAD BI: CPT

## 2021-12-15 PROCEDURE — 82565 ASSAY OF CREATININE: CPT

## 2021-12-15 PROCEDURE — 0 GADOBENATE DIMEGLUMINE 529 MG/ML SOLUTION: Performed by: SURGERY

## 2021-12-15 PROCEDURE — 88305 TISSUE EXAM BY PATHOLOGIST: CPT | Performed by: SURGERY

## 2021-12-15 PROCEDURE — 0 LIDOCAINE 1 % SOLUTION: Performed by: SURGERY

## 2021-12-15 RX ORDER — LIDOCAINE HYDROCHLORIDE 10 MG/ML
1 INJECTION, SOLUTION INFILTRATION; PERINEURAL ONCE
Status: COMPLETED | OUTPATIENT
Start: 2021-12-15 | End: 2021-12-15

## 2021-12-15 RX ORDER — DIAZEPAM 5 MG/1
5 TABLET ORAL ONCE AS NEEDED
Status: DISCONTINUED | OUTPATIENT
Start: 2021-12-15 | End: 2021-12-16 | Stop reason: HOSPADM

## 2021-12-15 RX ADMIN — LIDOCAINE HYDROCHLORIDE 1 ML: 10 INJECTION, SOLUTION INFILTRATION; PERINEURAL at 08:34

## 2021-12-15 RX ADMIN — GADOBENATE DIMEGLUMINE 13 ML: 529 INJECTION, SOLUTION INTRAVENOUS at 08:06

## 2021-12-15 RX ADMIN — LIDOCAINE HYDROCHLORIDE 15 ML: 10; .005 INJECTION, SOLUTION EPIDURAL; INFILTRATION; INTRACAUDAL; PERINEURAL at 08:35

## 2021-12-15 NOTE — NURSING NOTE
Patient not feeling relaxed with first half (5mg) Valium. Second half (5mg) of tablet taken by patient orally.

## 2021-12-15 NOTE — NURSING NOTE
Patient brought her own Valium 10mg. She was nervous about taking all 10 mg at once, so she took 1/2 tablet (5 mg) PO at 0712 a.m.

## 2021-12-15 NOTE — H&P
Name: Ara Diamond ADMIT: 12/15/2021   : 1975  PCP: Sis Moyer APRN    MRN: [id number] LOS: 0 days   AGE/SEX: 46 y.o. female  ROOM: Room/bed info not found       Chief complaint bilateral breast lesions on MRI     Present Illness or Internal History:  Patient is a 46 y.o. female presents with bilateral breast lesions on MRI.     Past Surgical History:  Past Surgical History:   Procedure Laterality Date   • ABDOMINOPLASTY     • LAPAROSCOPIC CHOLECYSTECTOMY     • OTHER SURGICAL HISTORY      Arm Surgery (Skin Removal)       Past Medical History:  Past Medical History:   Diagnosis Date   • Anxiety    • Migraine    • Seasonal allergies        Home Medications:  (Not in a hospital admission)      Allergies:  Penicillins    Family History:  Family History   Problem Relation Age of Onset   • Ovarian cancer Maternal Grandmother 64   • Pancreatic cancer Maternal Great-Grandmother        Social History:  Social History     Tobacco Use   • Smoking status: Former Smoker     Packs/day: 1.00     Years: 15.00     Pack years: 15.00     Types: Cigarettes     Quit date:      Years since quittin.9   • Smokeless tobacco: Never Used   Vaping Use   • Vaping Use: Never used   Substance Use Topics   • Alcohol use: No   • Drug use: No        Objective     Physical Exam:    No exam performed today,    Vital Signs  Temp:  [97.8 °F (36.6 °C)] 97.8 °F (36.6 °C)  Heart Rate:  [90] 90  Resp:  [16] 16  BP: (106)/(77) 106/77    Anticipated Surgical Procedure:  MRI guided bilateral breast biopsies with clip placement     The risks, benefits and alternatives of this procedure have been discussed with the patient or responsible party: Yes        Errol Kay Jr., MD  12/15/21  07:39 EST

## 2021-12-15 NOTE — NURSING NOTE
Biopsy sites x to left outer and right outer breasts clear with Dermabd dry and intact to both sites. No firmness or swelling noted at or around either biopsy site. Denies pain. Ice packs with protective coverings applied to biopsy sites. Patient awake, alert & oriented x4. Discharge instructions discussed with understanding voiced by patient. Copies provided to patient. No distress noted. To discharge exit via wheelchair per this nurse. To home via private vehicle driven by her . Patient brought her own Valium which was prescribed by her surgeon. Tolerated well.

## 2021-12-16 LAB
LAB AP CASE REPORT: NORMAL
LAB AP DIAGNOSIS COMMENT: NORMAL
PATH REPORT.FINAL DX SPEC: NORMAL
PATH REPORT.GROSS SPEC: NORMAL

## 2021-12-17 ENCOUNTER — PREP FOR SURGERY (OUTPATIENT)
Dept: OTHER | Facility: HOSPITAL | Age: 46
End: 2021-12-17

## 2021-12-17 ENCOUNTER — OFFICE VISIT (OUTPATIENT)
Dept: SURGERY | Facility: CLINIC | Age: 46
End: 2021-12-17

## 2021-12-17 VITALS
HEART RATE: 86 BPM | BODY MASS INDEX: 24.75 KG/M2 | SYSTOLIC BLOOD PRESSURE: 105 MMHG | WEIGHT: 145 LBS | HEIGHT: 64 IN | DIASTOLIC BLOOD PRESSURE: 73 MMHG | OXYGEN SATURATION: 99 %

## 2021-12-17 DIAGNOSIS — D24.2 INTRADUCTAL PAPILLOMA OF LEFT BREAST: ICD-10-CM

## 2021-12-17 DIAGNOSIS — D05.11 DUCTAL CARCINOMA IN SITU (DCIS) OF RIGHT BREAST: Primary | ICD-10-CM

## 2021-12-17 PROCEDURE — 99215 OFFICE O/P EST HI 40 MIN: CPT | Performed by: SURGERY

## 2021-12-17 RX ORDER — DIAZEPAM 5 MG/1
10 TABLET ORAL ONCE
Status: CANCELLED | OUTPATIENT
Start: 2022-01-18 | End: 2021-12-17

## 2021-12-17 RX ORDER — CEFAZOLIN SODIUM 2 G/100ML
2 INJECTION, SOLUTION INTRAVENOUS ONCE
Status: CANCELLED | OUTPATIENT
Start: 2022-01-18 | End: 2021-12-17

## 2021-12-17 RX ORDER — HEPARIN SODIUM 5000 [USP'U]/ML
5000 INJECTION, SOLUTION INTRAVENOUS; SUBCUTANEOUS
Status: CANCELLED | OUTPATIENT
Start: 2022-01-18 | End: 2022-01-19

## 2021-12-17 NOTE — PROGRESS NOTES
[facility]     Referring Provider: Self Referring     Chief complaint: Follow-up MRI biopsies, preoperative planning     HPI:   12/1/21:  Ms. 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 This was initially detected as an imaging abnormality on routine screening. Her work-up is detailed in the oncologic history below. Prior to the biopsy, she denies any breast lumps, pain, skin changes, or nipple discharge. She denies any prior history of abnormal mammograms or breast biopsies. She denies any family history of breast cancer. She has a family history of ovarian cancer in her maternal grandmother and pancreatic cancer in her maternal great grandmother.     12/17/21, Interval History:  She underwent genetic testing which was negative for mutation. She then underwent bilateral MR-guided biopsies. This was negative on the right and on the left showed an intraductal papilloma (see report details below). She was joined today in clinic by her . She gave consent for him to be present during her examination and participate in the discussion.        Oncology/Hematology History Overview Note   09/09/2020, Screening MMG with Jose ([facility]):  Heterogeneously dense. No dominant masses, suspicious microcalcifications or architectural distortions are identified in either breast. Stable exam without mammographic signs of malignancy.  BI-RADS 1: Negative.     Ductal carcinoma in situ (DCIS) of right breast   9/12/2021 Initial Diagnosis    Ductal carcinoma in situ (DCIS) of right breast     9/13/2021 Imaging    Screening MMG with Jsoe ([facility]):  Heterogeneously dense. There is a group of calcifications within the upper outer quadrant of the right breast. No additional suspicious calcifications, architectural distortions or mass lesions identified.  BI-RADS 0: Incomplete.     9/24/2021  Imaging    Right Diagnostic MMG with Jose ([facility]):  As noted on the screening mammogram, there are calcifications at the upper aspect of the right breast. The calcifications demonstrate a somewhat amorphous appearance in the CC projection. On the 90 degree view, the majority of the calcifications demonstrate evidence for layering indicating milk of calcium as a benign entity. However, there is a small cluster of calcifications noted at the posterior upper outer aspect of the right breast. This cluster of calcifications does not demonstrate evidence for layering. The findings indicate a suspicious cluster of calcifications. This cluster of calcifications is marked on the images. No suspicious masses or architectural distortions are observed.  BI-RADS 4: Suspicious.     10/28/2021 Biopsy    Right Breast, Stereotactic Biopsy ([facility]):    Right breast, stereotactic biopsy:  1. Ductal carcinoma in situ, cribriform type.  2. Nuclear grade 2 of 3.  3. Maximal dimension of DCIS is 0.3 cm.  4. Central comedo necrosis and calcifications are present.  5. No invasive carcinoma or lymphovascular invasion is identified.  6. The non-neoplastic breast shows fibrous mastopathy, apocrine hyperplasia and mild duct ectasia.    Comment - Two portions of tissue show ductal carcinoma in situ, cribriform type with central necrosis and calcifications.    ER+ (100%, strong)  N[place]+ (99%, strong)      [facility] PATHOLOGY REVIEW    1. Right Breast, Stereotactic Biopsy:               A. Ductal carcinoma in situ (DCIS):                            1. Low to intermediate grade cribriform DCIS with focal comedo type necrosis.                            2. DCIS focally present measuring up to at least 3 mm in greatest dimension.                            3. Microcalcification present within DCIS.                            4. Biomarkers ([facility]):                                         Estrogen Receptor:  Positive (100%, Douglas 8/8)                                         Progesterone Receptor: Positive (98%, Douglas 7/8)                                         Ki67 ([facility] performed in House) =  5%               B. No invasive carcinoma identified.     Comment: We essentially concur with the pathologist's original submitted diagnosis of intermediate grade cribriform ductal carcinoma in situ with focal comedo type necrosis and microcalcification.  Immunostain for CK5/6 performed at the submitting institution is reviewed demonstrating an intact myoepithelial layer in areas of concern.  The submitted paraffin block will be used for one H&E recut plus immunostain for Ki-67 reported above.  All slides prepared at the submitting institution in addition to their paraffin block will be returned.  Slides performed in house will be retained on file for comparison with the anticipated resection specimen.  This case was shared in internal consultation with Dr. Rogel, who concurred with the above diagnosis.     11/29/2021 Imaging    Bilateral Breast MRI ([facility]):  RIGHT BREAST:    At 11:00, 5.2 cm posterior to the nipple, there is a 0.7 cm AP dimension, 2.2 cm transverse dimension, 1.7 cm craniocaudal dimension peripherally enhancing biopsy cavity/tract with a central focus of susceptibility from a biopsy clip marking the site of biopsy-proven malignancy. No suspicious mass or nonmass enhancement is identified at the biopsy site.  At 6:00 in the posterior right breast, 6.2 cm posterior to the nipple, there is a 0.9 cm AP dimension, 0.8 cm transverse dimension, 0.5 cm craniocaudal dimension focal area of clumped nonmass enhancement, which  is suspicious.  No suspicious enhancement is identified in the right nipple or chest wall.  The visualized axilla is within normal limits.    LEFT BREAST:    At 3:00 in the anterior left breast, 3.5 cm posterior to the nipple, there is a 1.0 cm AP dimension, 1.1 cm transverse dimension, 0.7 cm  craniocaudal dimension enhancing mass with irregular margins, which is suspicious.  No suspicious enhancement is identified in the left nipple or chest wall.  The visualized axilla is within normal limits.   EXTRAMAMMARY FINDINGS:   There are no abnormally enlarged internal mammary chain lymph nodes on either side.  BI-RADS 4: Suspicious.     12/2/2021 Genetic Testing    [employer] Common Hereditary Cancers Panel (47 genes):    Negative     12/15/2021 Biopsy    Bilateral Breast, MR-Guided Biopsy ([facility]):    1.  Right Breast, 6 o'clock, Stereotatic Biopsies for Non-Mass Enhancement:                A.  Benign breast parenchyma with fibroadenomatoid hyperplasia, columnar cell hyperplasia and         stromal fibrosis.   B.  No atypical hyperplasia, in situ nor invasive carcinoma identified.      2.  Left Breast, 3 o'clock, Stereotatic Biopsies for a Mass:                A.  Sclerotic intraductal papilloma.               B.  No atypical hyperplasia, in situ nor invasive carcinoma identified.           Review of Systems:  See interval history.      Medications:    Current Outpatient Medications:   •  acyclovir (ZOVIRAX) 400 MG tablet, TAKE 1 TABLET BY MOUTH TWICE DAILY FOR 5 DAYS AS NEEDED, Disp: , Rfl:   •  cetirizine (zyrTEC) 10 MG tablet, TAKE ONE TABLET BY MOUTH EVERY DAY FOR ITCHING, Disp: , Rfl:   •  escitalopram (LEXAPRO) 20 MG tablet, Take 20 mg by mouth Daily., Disp: , Rfl:   •  hydrOXYzine (ATARAX) 10 MG tablet, TK 1/2 TO 1 T PO Q 6 H PRA, Disp: , Rfl:   •  loratadine-pseudoephedrine (CLARITIN-D 24-hour)  MG per 24 hr tablet, TAKE 1 TABLET BY MOUTH EVERY DAY, Disp: , Rfl:   •  terconazole (TERAZOL 3) 0.8 % vaginal cream, USE 1 APPLICATION VAGINALLY AT BEDTIME FOR 7 NIGHTS, Disp: , Rfl:   •  ZOLMitriptan (ZOMIG) 5 MG tablet, Take 5 mg by mouth., Disp: , Rfl:       Allergies   Allergen Reactions   • Penicillins Nausea Only       Family History   Problem Relation Age of Onset   • Ovarian cancer Maternal  Grandmother 64   • Pancreatic cancer Maternal Great-Grandmother        PHYSICAL EXAMINATION:   Vitals:    12/17/21 1229   BP: 105/73   Pulse: 86   SpO2: 99%     ECOG 0 - Asymptomatic  General: NAD, well appearing  Psych: a&o x3, normal mood and affect  Eyes: EOMI, no scleral icterus  ENMT: neck supple without masses or thyromegaly, mucous membranes moist  MSK: normal gait, normal ROM in bilateral shoulders  Lymph nodes: no cervical, supraclavicular or axillary lymphadenopathy  Breast: symmetric, moderate size, grade 2 ptosis  Right: No visible abnormalities on inspection while seated, with arms raised or hands on hips. No masses, skin changes, or nipple abnormalities.  Left: No visible abnormalities on inspection while seated, with arms raised or hands on hips. No masses, skin changes, or nipple abnormalities.      I have independently reviewed her imaging and here are my findings:   In the right upper outer breast, there initially was a 10 mm cluster of calcifications. MRI shows a 2.2 cm biopsy cavity at this location without any residual suspicious enhancement.  In the left breast at 3:00, there is an 11 mm irregular enhancing mass on MRI, which represents a biopsy-proven intraductal papilloma.      Assessment:  46 y.o. F with a diagnosis of right breast ductal carcinoma in situ (DCIS), low to intermediate grade, ER/MS+. She also has a new diagnosis of a left breast intraductal papilloma.    Discussion:  We first discussed the new right breast biopsy which was benign. This leaves her with 10 mm of calcifications representing the area of DCIS in the breast and I think she would be a very good candidate for breast conservation. We discussed that lumpectomy would require preoperative wire-localization. We also discussed the risk of positive margins and that she must have negative margins for lumpectomy to be an appropriate oncologic procedure. I will make every effort to obtain negative margins at her initial  operation, but there is a 10-15% chance that she will require a second operation for re-excision, or possibly a total mastectomy. We will not know the margin status until after her final pathology has returned.     We then discussed the left breast intraductal papilloma. I explained that this is a benign lesion, however because of the 11 mm size, I would recommend excision due to the low risk of upgrade (risk of identifying DCIS or invasive carcinoma within the vicinity of the lesion). She is in agreement with this plan. This will also require preoperative wire localization.    We discussed that axillary staging is usually not indicated for DCIS, but if invasive cancer is found on final pathology after lumpectomy, she would need a second operation for sentinel lymph node biopsy.    I described additional risks and potential complications associated with surgery, including, but not limited to, bleeding, infection, deformity/poor cosmetic result, chronic pain, numbness, seroma, hematoma, deep venous thrombosis, skin flap necrosis, disease recurrence and the possibility of requiring additional surgery. We also discussed other treatment options including the option of not undergoing any surgical treatment and the risks associated with this including disease progression. She expressed an understanding of these factors and wished to proceed.    Plan:  -Plastic surgery referral. She is interested in a small lift on each side to help improve cosmesis.  -Right needle-localized partial mastectomy and left breast needle-localized excisional biopsy with oncoplastic closure.  -Will refer to medical oncology and radiation oncology postoperatively.    Enedina Stovall MD    I spent 50 minutes caring for Ara on this date of service. This time includes time spent by me in the following activities: preparing for the visit, performing a medically appropriate examination and/or evaluation , counseling and educating the  patient/family/caregiver, referring and communicating with other health care professionals , documenting information in the medical record and independently interpreting results and communicating that information with the patient/family/caregiver.      CC:  RAOUL Torres PA

## 2021-12-20 ENCOUNTER — TRANSCRIBE ORDERS (OUTPATIENT)
Dept: SURGERY | Facility: CLINIC | Age: 46
End: 2021-12-20

## 2021-12-20 DIAGNOSIS — D05.11 DUCTAL CARCINOMA IN SITU OF RIGHT BREAST: Primary | ICD-10-CM

## 2021-12-20 DIAGNOSIS — D24.2 BENIGN NEOPLASM OF LEFT BREAST: ICD-10-CM

## 2021-12-20 DIAGNOSIS — D24.2 INTRADUCTAL PAPILLOMA OF BREAST, LEFT: Primary | ICD-10-CM

## 2021-12-21 ENCOUNTER — TELEPHONE (OUTPATIENT)
Dept: OTHER | Facility: HOSPITAL | Age: 46
End: 2021-12-21

## 2021-12-21 ENCOUNTER — TELEPHONE (OUTPATIENT)
Dept: SURGERY | Facility: CLINIC | Age: 46
End: 2021-12-21

## 2021-12-21 NOTE — TELEPHONE ENCOUNTER
Referral received from Dr. Stovall's office. Called Ms. Diamond and left a message introducing myself and navigational services. Asked her to call me back at her convenience and left my contact information.

## 2021-12-21 NOTE — TELEPHONE ENCOUNTER
Appointment with Dr. Zapata is scheduled on 1/5/2022 @ 8:00am.    Surgery is scheduled on 1/18/2022 @ 12:00pm, NL @ 9:30am, NL @ 10:30am.    PAT is scheduled on 1/11/2022 @ 2:30pm.    Post-op appointment with Dr. Stovall is scheduled on 2/3/2022 @ 8:00am.    Called and spoke to patient, patient expressed v/u of appointment times.    Sent patient a reminder letter in the mail.

## 2021-12-23 ENCOUNTER — NURSE NAVIGATOR (OUTPATIENT)
Dept: OTHER | Facility: HOSPITAL | Age: 46
End: 2021-12-23

## 2021-12-23 NOTE — PROGRESS NOTES
Referral received from Dr. Stovall's office. I called Ms. Diamond and introduced myself and navigational services. She states the plan is to have a double lumpectomy and lift on Jan 18th. She verbalized questions about radiation and hormone blocking therapy and we discussed those.  Otherwise, she has a good understanding of her pathology and treatment options and  is comfortable with her plan of care.     She stated she has good support right now and is doing well.      We discussed integrative therapies and other [facility]. She verbalized interest in receiving a navigation folder outlining services. I verified her mailing address and will send out a navigation folder with the following information:     [facility],  [facility] Breast Cancer Support Group, Cancer and Restorative Exercise (CARE), [employer] Exercise program, Guide for the Newly Diagnosed, [facility], [facility], Massage Therapy, Reiki Therapy, [employer] [place], Cancer Nutrition, Cleaning for a Reason, and Survivorship Clinic.    She verbalized appreciation for navigational services and she has my contact information and will call with any questions that arise.

## 2022-01-11 ENCOUNTER — PRE-ADMISSION TESTING (OUTPATIENT)
Dept: PREADMISSION TESTING | Facility: HOSPITAL | Age: 47
End: 2022-01-11

## 2022-01-11 ENCOUNTER — NURSE NAVIGATOR (OUTPATIENT)
Dept: OTHER | Facility: HOSPITAL | Age: 47
End: 2022-01-11

## 2022-01-11 VITALS
TEMPERATURE: 97.4 F | OXYGEN SATURATION: 99 % | WEIGHT: 146.3 LBS | SYSTOLIC BLOOD PRESSURE: 124 MMHG | BODY MASS INDEX: 24.98 KG/M2 | DIASTOLIC BLOOD PRESSURE: 70 MMHG | HEART RATE: 81 BPM | RESPIRATION RATE: 16 BRPM | HEIGHT: 64 IN

## 2022-01-11 DIAGNOSIS — D05.11 DUCTAL CARCINOMA IN SITU (DCIS) OF RIGHT BREAST: ICD-10-CM

## 2022-01-11 LAB
ANION GAP SERPL CALCULATED.3IONS-SCNC: 10.3 MMOL/L (ref 5–15)
BASOPHILS # BLD AUTO: 0.05 10*3/MM3 (ref 0–0.2)
BASOPHILS NFR BLD AUTO: 0.8 % (ref 0–1.5)
BUN SERPL-MCNC: 9 MG/DL (ref 6–20)
BUN/CREAT SERPL: 14.1 (ref 7–25)
CALCIUM SPEC-SCNC: 8.9 MG/DL (ref 8.6–10.5)
CHLORIDE SERPL-SCNC: 102 MMOL/L (ref 98–107)
CO2 SERPL-SCNC: 24.7 MMOL/L (ref 22–29)
CREAT SERPL-MCNC: 0.64 MG/DL (ref 0.57–1)
DEPRECATED RDW RBC AUTO: 40.6 FL (ref 37–54)
EOSINOPHIL # BLD AUTO: 0.17 10*3/MM3 (ref 0–0.4)
EOSINOPHIL NFR BLD AUTO: 2.6 % (ref 0.3–6.2)
ERYTHROCYTE [DISTWIDTH] IN BLOOD BY AUTOMATED COUNT: 11.8 % (ref 12.3–15.4)
GFR SERPL CREATININE-BSD FRML MDRD: 99 ML/MIN/1.73
GLUCOSE SERPL-MCNC: 89 MG/DL (ref 65–99)
HCT VFR BLD AUTO: 42 % (ref 34–46.6)
HGB BLD-MCNC: 14.4 G/DL (ref 12–15.9)
IMM GRANULOCYTES # BLD AUTO: 0.02 10*3/MM3 (ref 0–0.05)
IMM GRANULOCYTES NFR BLD AUTO: 0.3 % (ref 0–0.5)
LYMPHOCYTES # BLD AUTO: 2.49 10*3/MM3 (ref 0.7–3.1)
LYMPHOCYTES NFR BLD AUTO: 37.7 % (ref 19.6–45.3)
MCH RBC QN AUTO: 32.1 PG (ref 26.6–33)
MCHC RBC AUTO-ENTMCNC: 34.3 G/DL (ref 31.5–35.7)
MCV RBC AUTO: 93.5 FL (ref 79–97)
MONOCYTES # BLD AUTO: 0.46 10*3/MM3 (ref 0.1–0.9)
MONOCYTES NFR BLD AUTO: 7 % (ref 5–12)
NEUTROPHILS NFR BLD AUTO: 3.41 10*3/MM3 (ref 1.7–7)
NEUTROPHILS NFR BLD AUTO: 51.6 % (ref 42.7–76)
NRBC BLD AUTO-RTO: 0 /100 WBC (ref 0–0.2)
PLATELET # BLD AUTO: 233 10*3/MM3 (ref 140–450)
PMV BLD AUTO: 10.2 FL (ref 6–12)
POTASSIUM SERPL-SCNC: 3.7 MMOL/L (ref 3.5–5.2)
QT INTERVAL: 387 MS
RBC # BLD AUTO: 4.49 10*6/MM3 (ref 3.77–5.28)
SODIUM SERPL-SCNC: 137 MMOL/L (ref 136–145)
WBC NRBC COR # BLD: 6.6 10*3/MM3 (ref 3.4–10.8)

## 2022-01-11 PROCEDURE — 36415 COLL VENOUS BLD VENIPUNCTURE: CPT

## 2022-01-11 PROCEDURE — 93010 ELECTROCARDIOGRAM REPORT: CPT | Performed by: INTERNAL MEDICINE

## 2022-01-11 PROCEDURE — 80048 BASIC METABOLIC PNL TOTAL CA: CPT

## 2022-01-11 PROCEDURE — 85025 COMPLETE CBC W/AUTO DIFF WBC: CPT

## 2022-01-11 PROCEDURE — 93005 ELECTROCARDIOGRAM TRACING: CPT

## 2022-01-11 RX ORDER — ERGOCALCIFEROL (VITAMIN D2) 10 MCG
400 TABLET ORAL DAILY
COMMUNITY

## 2022-01-11 NOTE — PROGRESS NOTES
Met MsNeil Dara after her pre admission testing today. I introduced myself and navigational services. She is scheduled for a lumpectomy on Jan 18th. She has a good understanding of her pathology and treatment options and is comfortable with her plan of care. She had some questions about genetic testing, radiation therapy, and hormone blocking therapy and we discussed those.     She stated she has a good support system and has no resource needs at this time. She received her navigation folder in the mail and was thankful for the information.     She verbalized appreciation for navigational services and she has my contact information and will call with any questions that arise.

## 2022-01-11 NOTE — DISCHARGE INSTRUCTIONS
Take the following medications the morning of surgery: NONE    ARRIVE AT 8:30 AM      If you are on prescription narcotic pain medication to control your pain you may also take that medication the morning of surgery.    General Instructions:  • Do not eat solid food after midnight the night before surgery.  • You may drink clear liquids day of surgery but must stop at least one hour before your hospital arrival time.  • It is beneficial for you to have a clear drink that contains carbohydrates the day of surgery.  We suggest a 12 to 20 ounce bottle of Gatorade or Powerade for non-diabetic patients or a 12 to 20 ounce bottle of G2 or Powerade Zero for diabetic patients. (Pediatric patients, are not advised to drink a 12 to 20 ounce carbohydrate drink)    Clear liquids are liquids you can see through.  Nothing red in color.     Plain water                               Sports drinks  Sodas                                   Gelatin (Jell-O)  Fruit juices without pulp such as white grape juice and apple juice  Popsicles that contain no fruit or yogurt  Tea or coffee (no cream or milk added)  Gatorade / Powerade  G2 / Powerade Zero    • Patients who avoid smoking, chewing tobacco and alcohol for 4 weeks prior to surgery have a reduced risk of post-operative complications.  Quit smoking as many days before surgery as you can.  • Do not smoke, use chewing tobacco or drink alcohol the day of surgery.   • If applicable bring your C-PAP/ BI-PAP machine.  • Bring any papers given to you in the doctor’s office.  • Wear clean comfortable clothes.  • Do not wear contact lenses, false eyelashes or make-up.  Bring a case for your glasses.   • Bring crutches or walker if applicable.  • Remove all piercings.  Leave jewelry and any other valuables at home.  • Hair extensions with metal clips must be removed prior to surgery.  • The Pre-Admission Testing nurse will instruct you to bring medications if unable to obtain an accurate list  in Pre-Admission Testing.          Preventing a Surgical Site Infection:  • For 2 to 3 days before surgery, avoid shaving with a razor because the razor can irritate skin and make it easier to develop an infection.    • Any areas of open skin can increase the risk of a post-operative wound infection by allowing bacteria to enter and travel throughout the body.  Notify your surgeon if you have any skin wounds / rashes even if it is not near the expected surgical site.  The area will need assessed to determine if surgery should be delayed until it is healed.  • The night prior to surgery shower using a fresh bar of anti-bacterial soap (such as Dial) and clean washcloth.  Sleep in a clean bed with clean clothing.  Do not allow pets to sleep with you.  • Shower on the morning of surgery using a fresh bar of anti-bacterial soap (such as Dial) and clean washcloth.  Dry with a clean towel and dress in clean clothing.  • Ask your surgeon if you will be receiving antibiotics prior to surgery.  • Make sure you, your family, and all healthcare providers clean their hands with soap and water or an alcohol based hand  before caring for you or your wound.    Day of surgery:  Your arrival time is approximately two hours before your scheduled surgery time.  Upon arrival, a Pre-op nurse and Anesthesiologist will review your health history, obtain vital signs, and answer questions you may have.  The only belongings needed at this time will be a list of your home medications and if applicable your C-PAP/BI-PAP machine.  A Pre-op nurse will start an IV and you may receive medication in preparation for surgery, including something to help you relax.     Please be aware that surgery does come with discomfort.  We want to make every effort to control your discomfort so please discuss any uncontrolled symptoms with your nurse.   Your doctor will most likely have prescribed pain medications.      If you are going home after surgery  you will receive individualized written care instructions before being discharged.  A responsible adult must drive you to and from the hospital on the day of your surgery and stay with you for 24 hours.  Discharge prescriptions can be filled by the hospital pharmacy during regular pharmacy hours.  If you are having surgery late in the day/evening your prescription may be e-prescribed to your pharmacy.  Please verify your pharmacy hours or chose a 24 hour pharmacy to avoid not having access to your prescription because your pharmacy has closed for the day.    If you are staying overnight following surgery, you will be transported to your hospital room following the recovery period.  [facility] has all private rooms.    If you have any questions please call Pre-Admission Testing at [phone].  Deductibles and co-payments are collected on the day of service. Please be prepared to pay the required co-pay, deductible or deposit on the day of service as defined by your plan.    Patient Education for Self-Quarantine Process    • Following your COVID testing, we strongly recommend that you wear a mask when you are with other people and practice social distancing.   • Limit your activities to only required outings.  • Wash your hands with soap and water frequently for at least 20 seconds.   • Avoid touching your eyes, nose and mouth with unwashed hands.  • Do not share anything - utensils, drinking glasses, food from the same bowl.   • Sanitize household surfaces daily. Include all high touch areas (door handles, light switches, phones, countertops, etc.)    Call your surgeon immediately if you experience any of the following symptoms:  • Sore Throat  • Shortness of Breath or difficulty breathing  • Cough  • Chills  • Body soreness or muscle pain  • Headache  • Fever  • New loss of taste or smell  • Do not arrive for your surgery ill.  Your procedure will need to be rescheduled to another time.  You will  need to call your physician before the day of surgery to avoid any unnecessary exposure to hospital staff as well as other patients.

## 2022-01-14 ENCOUNTER — TELEPHONE (OUTPATIENT)
Dept: SURGERY | Facility: CLINIC | Age: 47
End: 2022-01-14

## 2022-01-15 ENCOUNTER — LAB (OUTPATIENT)
Dept: LAB | Facility: HOSPITAL | Age: 47
End: 2022-01-15

## 2022-01-15 DIAGNOSIS — D05.11 DUCTAL CARCINOMA IN SITU (DCIS) OF RIGHT BREAST: ICD-10-CM

## 2022-01-15 LAB — SARS-COV-2 ORF1AB RESP QL NAA+PROBE: NOT DETECTED

## 2022-01-15 PROCEDURE — U0004 COV-19 TEST NON-CDC HGH THRU: HCPCS

## 2022-01-15 PROCEDURE — C9803 HOPD COVID-19 SPEC COLLECT: HCPCS

## 2022-01-18 ENCOUNTER — APPOINTMENT (OUTPATIENT)
Dept: GENERAL RADIOLOGY | Facility: HOSPITAL | Age: 47
End: 2022-01-18

## 2022-01-18 ENCOUNTER — HOSPITAL ENCOUNTER (OUTPATIENT)
Dept: MAMMOGRAPHY | Facility: HOSPITAL | Age: 47
Discharge: HOME | End: 2022-01-18

## 2022-01-18 ENCOUNTER — ANESTHESIA (OUTPATIENT)
Dept: PERIOP | Facility: HOSPITAL | Age: 47
End: 2022-01-18

## 2022-01-18 ENCOUNTER — HOSPITAL ENCOUNTER (OUTPATIENT)
Facility: HOSPITAL | Age: 47
Setting detail: HOSPITAL OUTPATIENT SURGERY
Discharge: HOME | End: 2022-01-18
Attending: SURGERY | Admitting: SURGERY

## 2022-01-18 ENCOUNTER — ANESTHESIA EVENT (OUTPATIENT)
Dept: PERIOP | Facility: HOSPITAL | Age: 47
End: 2022-01-18

## 2022-01-18 VITALS
DIASTOLIC BLOOD PRESSURE: 79 MMHG | BODY MASS INDEX: 24.09 KG/M2 | RESPIRATION RATE: 16 BRPM | HEART RATE: 111 BPM | HEIGHT: 65 IN | OXYGEN SATURATION: 94 % | WEIGHT: 144.6 LBS | SYSTOLIC BLOOD PRESSURE: 102 MMHG | TEMPERATURE: 97.6 F

## 2022-01-18 DIAGNOSIS — D24.2 INTRADUCTAL PAPILLOMA OF LEFT BREAST: ICD-10-CM

## 2022-01-18 DIAGNOSIS — D05.11 DUCTAL CARCINOMA IN SITU (DCIS) OF RIGHT BREAST: ICD-10-CM

## 2022-01-18 DIAGNOSIS — D05.11 DUCTAL CARCINOMA IN SITU OF RIGHT BREAST: ICD-10-CM

## 2022-01-18 DIAGNOSIS — D24.2 INTRADUCTAL PAPILLOMA OF BREAST, LEFT: ICD-10-CM

## 2022-01-18 LAB
B-HCG UR QL: NEGATIVE
EXPIRATION DATE: NORMAL
INTERNAL NEGATIVE CONTROL: NORMAL
INTERNAL POSITIVE CONTROL: NORMAL
Lab: NORMAL

## 2022-01-18 PROCEDURE — 25010000002 NEOSTIGMINE 5 MG/10ML SOLUTION: Performed by: NURSE ANESTHETIST, CERTIFIED REGISTERED

## 2022-01-18 PROCEDURE — 0 CEFAZOLIN IN DEXTROSE 2-4 GM/100ML-% SOLUTION: Performed by: NURSE ANESTHETIST, CERTIFIED REGISTERED

## 2022-01-18 PROCEDURE — C1819 TISSUE LOCALIZATION-EXCISION: HCPCS

## 2022-01-18 PROCEDURE — 25010000002 DEXAMETHASONE PER 1 MG: Performed by: STUDENT IN AN ORGANIZED HEALTH CARE EDUCATION/TRAINING PROGRAM

## 2022-01-18 PROCEDURE — 0 LIDOCAINE 1 % SOLUTION: Performed by: RADIOLOGY

## 2022-01-18 PROCEDURE — 88305 TISSUE EXAM BY PATHOLOGIST: CPT | Performed by: SURGERY

## 2022-01-18 PROCEDURE — 25010000002 FENTANYL CITRATE (PF) 50 MCG/ML SOLUTION: Performed by: STUDENT IN AN ORGANIZED HEALTH CARE EDUCATION/TRAINING PROGRAM

## 2022-01-18 PROCEDURE — 0 CEFAZOLIN IN DEXTROSE 2-4 GM/100ML-% SOLUTION: Performed by: SURGERY

## 2022-01-18 PROCEDURE — 76098 X-RAY EXAM SURGICAL SPECIMEN: CPT

## 2022-01-18 PROCEDURE — 25010000002 MIDAZOLAM PER 1 MG: Performed by: ANESTHESIOLOGY

## 2022-01-18 PROCEDURE — 25010000002 PROPOFOL 10 MG/ML EMULSION: Performed by: STUDENT IN AN ORGANIZED HEALTH CARE EDUCATION/TRAINING PROGRAM

## 2022-01-18 PROCEDURE — 19125 EXCISION BREAST LESION: CPT | Performed by: SURGERY

## 2022-01-18 PROCEDURE — 81025 URINE PREGNANCY TEST: CPT | Performed by: ANESTHESIOLOGY

## 2022-01-18 PROCEDURE — 88307 TISSUE EXAM BY PATHOLOGIST: CPT | Performed by: SURGERY

## 2022-01-18 PROCEDURE — 0 LIDOCAINE 1 % SOLUTION: Performed by: SURGERY

## 2022-01-18 PROCEDURE — S0260 H&P FOR SURGERY: HCPCS | Performed by: SURGERY

## 2022-01-18 PROCEDURE — 25010000002 HYDROMORPHONE PER 4 MG: Performed by: STUDENT IN AN ORGANIZED HEALTH CARE EDUCATION/TRAINING PROGRAM

## 2022-01-18 PROCEDURE — 25010000002 HEPARIN (PORCINE) PER 1000 UNITS: Performed by: SURGERY

## 2022-01-18 PROCEDURE — 19301 PARTIAL MASTECTOMY: CPT | Performed by: SURGERY

## 2022-01-18 PROCEDURE — 25010000002 ONDANSETRON PER 1 MG: Performed by: NURSE ANESTHETIST, CERTIFIED REGISTERED

## 2022-01-18 RX ORDER — HYDROCODONE BITARTRATE AND ACETAMINOPHEN 7.5; 325 MG/1; MG/1
1 TABLET ORAL ONCE AS NEEDED
Status: DISCONTINUED | OUTPATIENT
Start: 2022-01-18 | End: 2022-01-18 | Stop reason: HOSPADM

## 2022-01-18 RX ORDER — FAMOTIDINE 10 MG/ML
20 INJECTION, SOLUTION INTRAVENOUS ONCE
Status: COMPLETED | OUTPATIENT
Start: 2022-01-18 | End: 2022-01-18

## 2022-01-18 RX ORDER — OXYCODONE AND ACETAMINOPHEN 7.5; 325 MG/1; MG/1
1 TABLET ORAL EVERY 4 HOURS PRN
Status: DISCONTINUED | OUTPATIENT
Start: 2022-01-18 | End: 2022-01-18 | Stop reason: HOSPADM

## 2022-01-18 RX ORDER — HYDROMORPHONE HYDROCHLORIDE 1 MG/ML
0.5 INJECTION, SOLUTION INTRAMUSCULAR; INTRAVENOUS; SUBCUTANEOUS
Status: DISCONTINUED | OUTPATIENT
Start: 2022-01-18 | End: 2022-01-18 | Stop reason: HOSPADM

## 2022-01-18 RX ORDER — LIDOCAINE HYDROCHLORIDE 10 MG/ML
3 INJECTION, SOLUTION INFILTRATION; PERINEURAL ONCE
Status: COMPLETED | OUTPATIENT
Start: 2022-01-18 | End: 2022-01-18

## 2022-01-18 RX ORDER — FENTANYL CITRATE 50 UG/ML
50 INJECTION, SOLUTION INTRAMUSCULAR; INTRAVENOUS
Status: DISCONTINUED | OUTPATIENT
Start: 2022-01-18 | End: 2022-01-18 | Stop reason: HOSPADM

## 2022-01-18 RX ORDER — MIDAZOLAM HYDROCHLORIDE 1 MG/ML
1 INJECTION INTRAMUSCULAR; INTRAVENOUS
Status: DISCONTINUED | OUTPATIENT
Start: 2022-01-18 | End: 2022-01-18 | Stop reason: HOSPADM

## 2022-01-18 RX ORDER — EPHEDRINE SULFATE 50 MG/ML
INJECTION, SOLUTION INTRAVENOUS AS NEEDED
Status: DISCONTINUED | OUTPATIENT
Start: 2022-01-18 | End: 2022-01-18 | Stop reason: SURG

## 2022-01-18 RX ORDER — HYDROMORPHONE HCL 110MG/55ML
PATIENT CONTROLLED ANALGESIA SYRINGE INTRAVENOUS AS NEEDED
Status: DISCONTINUED | OUTPATIENT
Start: 2022-01-18 | End: 2022-01-18 | Stop reason: SURG

## 2022-01-18 RX ORDER — SODIUM CHLORIDE 0.9 % (FLUSH) 0.9 %
3-10 SYRINGE (ML) INJECTION AS NEEDED
Status: DISCONTINUED | OUTPATIENT
Start: 2022-01-18 | End: 2022-01-18 | Stop reason: HOSPADM

## 2022-01-18 RX ORDER — GLYCOPYRROLATE 0.2 MG/ML
INJECTION INTRAMUSCULAR; INTRAVENOUS AS NEEDED
Status: DISCONTINUED | OUTPATIENT
Start: 2022-01-18 | End: 2022-01-18 | Stop reason: SURG

## 2022-01-18 RX ORDER — BUPIVACAINE HYDROCHLORIDE AND EPINEPHRINE 2.5; 5 MG/ML; UG/ML
INJECTION, SOLUTION EPIDURAL; INFILTRATION; INTRACAUDAL; PERINEURAL AS NEEDED
Status: DISCONTINUED | OUTPATIENT
Start: 2022-01-18 | End: 2022-01-18 | Stop reason: HOSPADM

## 2022-01-18 RX ORDER — ONDANSETRON 2 MG/ML
4 INJECTION INTRAMUSCULAR; INTRAVENOUS ONCE AS NEEDED
Status: DISCONTINUED | OUTPATIENT
Start: 2022-01-18 | End: 2022-01-18 | Stop reason: HOSPADM

## 2022-01-18 RX ORDER — DEXAMETHASONE SODIUM PHOSPHATE 10 MG/ML
INJECTION INTRAMUSCULAR; INTRAVENOUS AS NEEDED
Status: DISCONTINUED | OUTPATIENT
Start: 2022-01-18 | End: 2022-01-18 | Stop reason: SURG

## 2022-01-18 RX ORDER — DIAZEPAM 5 MG/1
10 TABLET ORAL ONCE
Status: COMPLETED | OUTPATIENT
Start: 2022-01-18 | End: 2022-01-18

## 2022-01-18 RX ORDER — ONDANSETRON 2 MG/ML
INJECTION INTRAMUSCULAR; INTRAVENOUS AS NEEDED
Status: DISCONTINUED | OUTPATIENT
Start: 2022-01-18 | End: 2022-01-18 | Stop reason: SURG

## 2022-01-18 RX ORDER — MAGNESIUM HYDROXIDE 1200 MG/15ML
LIQUID ORAL AS NEEDED
Status: DISCONTINUED | OUTPATIENT
Start: 2022-01-18 | End: 2022-01-18 | Stop reason: HOSPADM

## 2022-01-18 RX ORDER — EPHEDRINE SULFATE 50 MG/ML
5 INJECTION, SOLUTION INTRAVENOUS ONCE AS NEEDED
Status: DISCONTINUED | OUTPATIENT
Start: 2022-01-18 | End: 2022-01-18 | Stop reason: HOSPADM

## 2022-01-18 RX ORDER — FENTANYL CITRATE 50 UG/ML
INJECTION, SOLUTION INTRAMUSCULAR; INTRAVENOUS AS NEEDED
Status: DISCONTINUED | OUTPATIENT
Start: 2022-01-18 | End: 2022-01-18 | Stop reason: SURG

## 2022-01-18 RX ORDER — LABETALOL HYDROCHLORIDE 5 MG/ML
5 INJECTION, SOLUTION INTRAVENOUS
Status: DISCONTINUED | OUTPATIENT
Start: 2022-01-18 | End: 2022-01-18 | Stop reason: HOSPADM

## 2022-01-18 RX ORDER — DIPHENHYDRAMINE HCL 25 MG
25 CAPSULE ORAL
Status: DISCONTINUED | OUTPATIENT
Start: 2022-01-18 | End: 2022-01-18 | Stop reason: HOSPADM

## 2022-01-18 RX ORDER — IBUPROFEN 600 MG/1
600 TABLET ORAL ONCE AS NEEDED
Status: DISCONTINUED | OUTPATIENT
Start: 2022-01-18 | End: 2022-01-18 | Stop reason: HOSPADM

## 2022-01-18 RX ORDER — PROMETHAZINE HYDROCHLORIDE 25 MG/1
25 SUPPOSITORY RECTAL ONCE AS NEEDED
Status: DISCONTINUED | OUTPATIENT
Start: 2022-01-18 | End: 2022-01-18 | Stop reason: HOSPADM

## 2022-01-18 RX ORDER — ACETAMINOPHEN 500 MG
1000 TABLET ORAL EVERY 8 HOURS
Qty: 30 TABLET | Refills: 0 | Status: SHIPPED | OUTPATIENT
Start: 2022-01-18 | End: 2022-01-23

## 2022-01-18 RX ORDER — PROMETHAZINE HYDROCHLORIDE 25 MG/1
25 TABLET ORAL ONCE AS NEEDED
Status: DISCONTINUED | OUTPATIENT
Start: 2022-01-18 | End: 2022-01-18 | Stop reason: HOSPADM

## 2022-01-18 RX ORDER — HYDRALAZINE HYDROCHLORIDE 20 MG/ML
5 INJECTION INTRAMUSCULAR; INTRAVENOUS
Status: DISCONTINUED | OUTPATIENT
Start: 2022-01-18 | End: 2022-01-18 | Stop reason: HOSPADM

## 2022-01-18 RX ORDER — KETAMINE HYDROCHLORIDE 10 MG/ML
INJECTION INTRAMUSCULAR; INTRAVENOUS AS NEEDED
Status: DISCONTINUED | OUTPATIENT
Start: 2022-01-18 | End: 2022-01-18 | Stop reason: SURG

## 2022-01-18 RX ORDER — ROCURONIUM BROMIDE 10 MG/ML
INJECTION, SOLUTION INTRAVENOUS AS NEEDED
Status: DISCONTINUED | OUTPATIENT
Start: 2022-01-18 | End: 2022-01-18 | Stop reason: SURG

## 2022-01-18 RX ORDER — LIDOCAINE HYDROCHLORIDE 20 MG/ML
INJECTION, SOLUTION INFILTRATION; PERINEURAL AS NEEDED
Status: DISCONTINUED | OUTPATIENT
Start: 2022-01-18 | End: 2022-01-18 | Stop reason: SURG

## 2022-01-18 RX ORDER — NALOXONE HCL 0.4 MG/ML
0.2 VIAL (ML) INJECTION AS NEEDED
Status: DISCONTINUED | OUTPATIENT
Start: 2022-01-18 | End: 2022-01-18 | Stop reason: HOSPADM

## 2022-01-18 RX ORDER — CEFAZOLIN SODIUM 2 G/100ML
2 INJECTION, SOLUTION INTRAVENOUS ONCE
Status: COMPLETED | OUTPATIENT
Start: 2022-01-18 | End: 2022-01-18

## 2022-01-18 RX ORDER — OXYCODONE HYDROCHLORIDE 5 MG/1
5 TABLET ORAL EVERY 6 HOURS PRN
Qty: 15 TABLET | Refills: 0 | Status: SHIPPED | OUTPATIENT
Start: 2022-01-18 | End: 2022-02-03

## 2022-01-18 RX ORDER — NEOSTIGMINE METHYLSULFATE 0.5 MG/ML
INJECTION, SOLUTION INTRAVENOUS AS NEEDED
Status: DISCONTINUED | OUTPATIENT
Start: 2022-01-18 | End: 2022-01-18 | Stop reason: SURG

## 2022-01-18 RX ORDER — SODIUM CHLORIDE, SODIUM LACTATE, POTASSIUM CHLORIDE, CALCIUM CHLORIDE 600; 310; 30; 20 MG/100ML; MG/100ML; MG/100ML; MG/100ML
9 INJECTION, SOLUTION INTRAVENOUS CONTINUOUS
Status: DISCONTINUED | OUTPATIENT
Start: 2022-01-18 | End: 2022-01-18 | Stop reason: HOSPADM

## 2022-01-18 RX ORDER — DIPHENHYDRAMINE HYDROCHLORIDE 50 MG/ML
12.5 INJECTION INTRAMUSCULAR; INTRAVENOUS
Status: DISCONTINUED | OUTPATIENT
Start: 2022-01-18 | End: 2022-01-18 | Stop reason: HOSPADM

## 2022-01-18 RX ORDER — BUPIVACAINE HYDROCHLORIDE AND EPINEPHRINE 5; 5 MG/ML; UG/ML
INJECTION, SOLUTION PERINEURAL AS NEEDED
Status: DISCONTINUED | OUTPATIENT
Start: 2022-01-18 | End: 2022-01-18 | Stop reason: HOSPADM

## 2022-01-18 RX ORDER — HEPARIN SODIUM 5000 [USP'U]/ML
5000 INJECTION, SOLUTION INTRAVENOUS; SUBCUTANEOUS
Status: COMPLETED | OUTPATIENT
Start: 2022-01-18 | End: 2022-01-18

## 2022-01-18 RX ORDER — PROPOFOL 10 MG/ML
VIAL (ML) INTRAVENOUS AS NEEDED
Status: DISCONTINUED | OUTPATIENT
Start: 2022-01-18 | End: 2022-01-18 | Stop reason: SURG

## 2022-01-18 RX ORDER — FLUMAZENIL 0.1 MG/ML
0.2 INJECTION INTRAVENOUS AS NEEDED
Status: DISCONTINUED | OUTPATIENT
Start: 2022-01-18 | End: 2022-01-18 | Stop reason: HOSPADM

## 2022-01-18 RX ORDER — LIDOCAINE HYDROCHLORIDE 10 MG/ML
0.5 INJECTION, SOLUTION EPIDURAL; INFILTRATION; INTRACAUDAL; PERINEURAL ONCE AS NEEDED
Status: DISCONTINUED | OUTPATIENT
Start: 2022-01-18 | End: 2022-01-18 | Stop reason: HOSPADM

## 2022-01-18 RX ORDER — CEFAZOLIN SODIUM 2 G/100ML
INJECTION, SOLUTION INTRAVENOUS AS NEEDED
Status: DISCONTINUED | OUTPATIENT
Start: 2022-01-18 | End: 2022-01-18 | Stop reason: SURG

## 2022-01-18 RX ORDER — SODIUM CHLORIDE 0.9 % (FLUSH) 0.9 %
3 SYRINGE (ML) INJECTION EVERY 12 HOURS SCHEDULED
Status: DISCONTINUED | OUTPATIENT
Start: 2022-01-18 | End: 2022-01-18 | Stop reason: HOSPADM

## 2022-01-18 RX ORDER — SCOLOPAMINE TRANSDERMAL SYSTEM 1 MG/1
1 PATCH, EXTENDED RELEASE TRANSDERMAL ONCE
Status: DISCONTINUED | OUTPATIENT
Start: 2022-01-18 | End: 2022-01-18 | Stop reason: HOSPADM

## 2022-01-18 RX ADMIN — SCOPALAMINE 1 PATCH: 1 PATCH, EXTENDED RELEASE TRANSDERMAL at 08:08

## 2022-01-18 RX ADMIN — FENTANYL CITRATE 50 MCG: 0.05 INJECTION, SOLUTION INTRAMUSCULAR; INTRAVENOUS at 11:35

## 2022-01-18 RX ADMIN — FAMOTIDINE 20 MG: 10 INJECTION INTRAVENOUS at 08:08

## 2022-01-18 RX ADMIN — EPHEDRINE SULFATE 5 MG: 50 INJECTION INTRAVENOUS at 12:01

## 2022-01-18 RX ADMIN — PROPOFOL 170 MG: 10 INJECTION, EMULSION INTRAVENOUS at 11:13

## 2022-01-18 RX ADMIN — DIAZEPAM 10 MG: 5 TABLET ORAL at 07:50

## 2022-01-18 RX ADMIN — FENTANYL CITRATE 25 MCG: 0.05 INJECTION, SOLUTION INTRAMUSCULAR; INTRAVENOUS at 12:42

## 2022-01-18 RX ADMIN — SODIUM CHLORIDE, POTASSIUM CHLORIDE, SODIUM LACTATE AND CALCIUM CHLORIDE: 600; 310; 30; 20 INJECTION, SOLUTION INTRAVENOUS at 13:08

## 2022-01-18 RX ADMIN — CEFAZOLIN SODIUM 2 G: 2 INJECTION, SOLUTION INTRAVENOUS at 15:02

## 2022-01-18 RX ADMIN — PROPOFOL 25 MCG/KG/MIN: 10 INJECTION, EMULSION INTRAVENOUS at 11:39

## 2022-01-18 RX ADMIN — HYDROMORPHONE HYDROCHLORIDE 0.5 MG: 2 INJECTION, SOLUTION INTRAMUSCULAR; INTRAVENOUS; SUBCUTANEOUS at 14:10

## 2022-01-18 RX ADMIN — NEOSTIGMINE METHYLSULFATE 3 MG: 0.5 INJECTION INTRAVENOUS at 15:13

## 2022-01-18 RX ADMIN — ROCURONIUM BROMIDE 50 MG: 50 INJECTION INTRAVENOUS at 11:14

## 2022-01-18 RX ADMIN — HEPARIN SODIUM 5000 UNITS: 5000 INJECTION INTRAVENOUS; SUBCUTANEOUS at 10:07

## 2022-01-18 RX ADMIN — KETAMINE HYDROCHLORIDE 20 MG: 10 INJECTION INTRAMUSCULAR; INTRAVENOUS at 11:20

## 2022-01-18 RX ADMIN — DEXAMETHASONE SODIUM PHOSPHATE 8 MG: 10 INJECTION INTRAMUSCULAR; INTRAVENOUS at 11:20

## 2022-01-18 RX ADMIN — Medication 3 ML: at 09:46

## 2022-01-18 RX ADMIN — OXYCODONE HYDROCHLORIDE AND ACETAMINOPHEN 1 TABLET: 7.5; 325 TABLET ORAL at 16:18

## 2022-01-18 RX ADMIN — MIDAZOLAM 1 MG: 1 INJECTION INTRAMUSCULAR; INTRAVENOUS at 10:07

## 2022-01-18 RX ADMIN — EPHEDRINE SULFATE 5 MG: 50 INJECTION INTRAVENOUS at 12:10

## 2022-01-18 RX ADMIN — KETAMINE HYDROCHLORIDE 10 MG: 10 INJECTION INTRAMUSCULAR; INTRAVENOUS at 12:42

## 2022-01-18 RX ADMIN — KETAMINE HYDROCHLORIDE 10 MG: 10 INJECTION INTRAMUSCULAR; INTRAVENOUS at 13:29

## 2022-01-18 RX ADMIN — GLYCOPYRROLATE 0.6 MG: 0.2 INJECTION INTRAMUSCULAR; INTRAVENOUS at 15:13

## 2022-01-18 RX ADMIN — KETAMINE HYDROCHLORIDE 10 MG: 10 INJECTION INTRAMUSCULAR; INTRAVENOUS at 13:43

## 2022-01-18 RX ADMIN — ONDANSETRON 4 MG: 2 INJECTION INTRAMUSCULAR; INTRAVENOUS at 15:13

## 2022-01-18 RX ADMIN — CEFAZOLIN SODIUM 2 G: 2 INJECTION, SOLUTION INTRAVENOUS at 10:59

## 2022-01-18 RX ADMIN — ROCURONIUM BROMIDE 20 MG: 50 INJECTION INTRAVENOUS at 13:07

## 2022-01-18 RX ADMIN — LIDOCAINE HYDROCHLORIDE 100 MG: 20 INJECTION, SOLUTION INFILTRATION; PERINEURAL at 11:13

## 2022-01-18 RX ADMIN — Medication 3 ML: at 09:12

## 2022-01-18 RX ADMIN — FENTANYL CITRATE 25 MCG: 0.05 INJECTION, SOLUTION INTRAMUSCULAR; INTRAVENOUS at 13:07

## 2022-01-18 RX ADMIN — SODIUM CHLORIDE, POTASSIUM CHLORIDE, SODIUM LACTATE AND CALCIUM CHLORIDE 9 ML/HR: 600; 310; 30; 20 INJECTION, SOLUTION INTRAVENOUS at 08:02

## 2022-01-18 NOTE — DISCHARGE INSTRUCTIONS
Leave dressing in place until seen by Dr. Zapata.   No showering until ok with Dr. Zapata.   No lifting or pulling with arm on the side of operation until cleared by MD.   Call the office at [phone] with any questions. Also call for temp>101.5, redness, drainage, or opening of incision.        Scopolamine Patch  This patch has been applied to the skin behind one of your ears.  It may stay in place up to 24 hours. You may remove it at any time after your surgery; however, it should be removed after you are up and walking around the next day.  This medicine reduces stomach upset. Side effects may include: dry mouth, dizziness, sleepiness, constipation, or upset stomach.  An allergy would show up as: a rash, itching, wheezing or shortness of breath.  Follow these instructions:  1. Do not drink alcohol, drive or operate machinery while taking this medicine.  2. Wear only 1 patch at a time. You can leave the patch on for up to 24 hours.  3. When you remove the patch, fold it in half with the sticky sides together and throw it away. Wash your hands and the area under the patch.  4. Do not touch your eye with your hand if it has touched the patch.  5. Wash your hands well before and after touching the patch.  6. Sit or stand slowly to avoid dizziness.  Call your doctor if you have:  1. Any sign of allergy  2. No relief  3. Trouble passing urine  4. Any new or severe symptoms

## 2022-01-18 NOTE — ANESTHESIA POSTPROCEDURE EVALUATION
"Patient: Ara Diamond    Procedure Summary     Date: 01/18/22 Room / Location: [facility] OR [address] [place] [address] MAIN OR    Anesthesia Start: 1110 Anesthesia Stop: 1529    Procedures:       Right needle-localized partial mastectomy,Left breast needle-localized excisional biopsy (Right Breast)      RIGHT BREAST ONCOPLASTIC RECONSTRUCTION WITH SYMMETRY PROCEDURE (Bilateral Chest) Diagnosis:       Ductal carcinoma in situ (DCIS) of right breast      Intraductal papilloma of left breast      (Ductal carcinoma in situ (DCIS) of right breast [D05.11])      (Intraductal papilloma of left breast [D24.2])    Surgeons: Enedina Stovall MD; Nestor Zapata MD Provider: Rich Sprague MD    Anesthesia Type: general ASA Status: 2          Anesthesia Type: general    Vitals  Vitals Value Taken Time   /77 01/18/22 1631   Temp 36.4 °C (97.6 °F) 01/18/22 1525   Pulse 102 01/18/22 1635   Resp 16 01/18/22 1545   SpO2 94 % 01/18/22 1635   Vitals shown include unvalidated device data.        Post Anesthesia Care and Evaluation    Patient location during evaluation: bedside  Patient participation: complete - patient participated  Level of consciousness: awake and alert  Pain management: adequate  Airway patency: patent  Anesthetic complications: No anesthetic complications    Cardiovascular status: acceptable  Respiratory status: acceptable  Hydration status: acceptable    Comments: /84   Pulse 105   Temp 36.4 °C (97.6 °F) (Oral)   Resp 16   Ht 165.1 cm (65\")   Wt 65.6 kg (144 lb 9.6 oz)   LMP 01/04/2022 (Exact Date)   SpO2 97%   Breastfeeding No   BMI 24.06 kg/m²           "

## 2022-01-18 NOTE — ANESTHESIA PROCEDURE NOTES
Airway  Urgency: elective    Date/Time: 1/18/2022 11:17 AM  Airway not difficult    General Information and Staff    Patient location during procedure: OR  Anesthesiologist: Rich Sprague MD  CRNA: Paddy Burrell CRNA    Indications and Patient Condition  Indications for airway management: airway protection    Preoxygenated: yes  MILS not maintained throughout  Mask difficulty assessment: 1 - vent by mask    Final Airway Details  Final airway type: endotracheal airway      Successful airway: ETT  Cuffed: yes   Successful intubation technique: direct laryngoscopy  Endotracheal tube insertion site: oral  Blade: Ifeoma  Blade size: 3  ETT size (mm): 7.0  Cormack-Lehane Classification: grade IIa - partial view of glottis  Placement verified by: capnometry   Cuff volume (mL): 7  Measured from: lips  ETT/EBT  to lips (cm): 21  Number of attempts at approach: 1  Assessment: lips, teeth, and gum same as pre-op and atraumatic intubation

## 2022-01-18 NOTE — H&P
BREAST SURGERY HISTORY & PHYSICAL        Chief complaint:  Right breast DCIS and left breast intraductal papilloma     HPI:   12/1/21:  Ms. 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 This was initially detected as an imaging abnormality on routine screening. Her work-up is detailed in the oncologic history below. Prior to the biopsy, she denies any breast lumps, pain, skin changes, or nipple discharge. She denies any prior history of abnormal mammograms or breast biopsies. She denies any family history of breast cancer. She has a family history of ovarian cancer in her maternal grandmother and pancreatic cancer in her maternal great grandmother.      12/17/21:  She underwent genetic testing which was negative for mutation. She then underwent bilateral MR-guided biopsies. This was negative on the right and on the left showed an intraductal papilloma (see report details below).     1/18/22, Interval History:  She presents today for surgery. She denies any new complaints.             Oncology/Hematology History Overview Note    09/09/2020, Screening MMG with Jose ([facility]):  Heterogeneously dense. No dominant masses, suspicious microcalcifications or architectural distortions are identified in either breast. Stable exam without mammographic signs of malignancy.  BI-RADS 1: Negative.      Ductal carcinoma in situ (DCIS) of right breast    9/12/2021 Initial Diagnosis      Ductal carcinoma in situ (DCIS) of right breast       9/13/2021 Imaging      Screening MMG with Jose ([facility]):  Heterogeneously dense. There is a group of calcifications within the upper outer quadrant of the right breast. No additional suspicious calcifications, architectural distortions or mass lesions identified.  BI-RADS 0: Incomplete.       9/24/2021 Imaging      Right Diagnostic MMG with Jose ([facility]):  As noted on the screening mammogram, there are calcifications at the upper aspect of the right breast. The calcifications demonstrate a somewhat amorphous appearance in the CC projection. On the 90 degree view, the majority of the calcifications demonstrate evidence for layering indicating milk of calcium as a benign entity. However, there is a small cluster of calcifications noted at the posterior upper outer aspect of the right breast. This cluster of calcifications does not demonstrate evidence for layering. The findings indicate a suspicious cluster of calcifications. This cluster of calcifications is marked on the images. No suspicious masses or architectural distortions are observed.  BI-RADS 4: Suspicious.       10/28/2021 Biopsy      Right Breast, Stereotactic Biopsy ([facility]):     Right breast, stereotactic biopsy:  1. Ductal carcinoma in situ, cribriform type.  2. Nuclear grade 2 of 3.  3. Maximal dimension of DCIS is 0.3 cm.  4. Central comedo necrosis and calcifications are present.  5. No invasive carcinoma or lymphovascular invasion is identified.  6. The non-neoplastic breast shows fibrous mastopathy, apocrine hyperplasia and mild duct ectasia.     Comment - Two portions of tissue show ductal carcinoma in situ, cribriform type with central necrosis and calcifications.     ER+ (100%, strong)  PA+ (99%, strong)        [facility] PATHOLOGY REVIEW     1. Right Breast, Stereotactic Biopsy:               A. Ductal carcinoma in situ (DCIS):                            1. Low to intermediate grade cribriform DCIS with focal comedo type necrosis.                            2. DCIS focally present measuring up to at least 3 mm in greatest dimension.                            3. Microcalcification present within DCIS.                            4. Biomarkers ([facility]):                                         Estrogen Receptor: Positive (100%, Douglas  8/8)                                         Progesterone Receptor: Positive (98%, Douglas 7/8)                                         Ki67 ([facility] performed in House) =  5%               B. No invasive carcinoma identified.     Comment: We essentially concur with the pathologist's original submitted diagnosis of intermediate grade cribriform ductal carcinoma in situ with focal comedo type necrosis and microcalcification.  Immunostain for CK5/6 performed at the submitting institution is reviewed demonstrating an intact myoepithelial layer in areas of concern.  The submitted paraffin block will be used for one H&E recut plus immunostain for Ki-67 reported above.  All slides prepared at the submitting institution in addition to their paraffin block will be returned.  Slides performed in house will be retained on file for comparison with the anticipated resection specimen.  This case was shared in internal consultation with Dr. Rogel, who concurred with the above diagnosis.       11/29/2021 Imaging      Bilateral Breast MRI ([facility]):  RIGHT BREAST:    At 11:00, 5.2 cm posterior to the nipple, there is a 0.7 cm AP dimension, 2.2 cm transverse dimension, 1.7 cm craniocaudal dimension peripherally enhancing biopsy cavity/tract with a central focus of susceptibility from a biopsy clip marking the site of biopsy-proven malignancy. No suspicious mass or nonmass enhancement is identified at the biopsy site.  At 6:00 in the posterior right breast, 6.2 cm posterior to the nipple, there is a 0.9 cm AP dimension, 0.8 cm transverse dimension, 0.5 cm craniocaudal dimension focal area of clumped nonmass enhancement, which  is suspicious.  No suspicious enhancement is identified in the right nipple or chest wall.  The visualized axilla is within normal limits.    LEFT BREAST:    At 3:00 in the anterior left breast, 3.5 cm posterior to the nipple, there is a 1.0 cm AP dimension, 1.1 cm transverse dimension, 0.7 cm craniocaudal dimension  enhancing mass with irregular margins, which is suspicious.  No suspicious enhancement is identified in the left nipple or chest wall.  The visualized axilla is within normal limits.   EXTRAMAMMARY FINDINGS:   There are no abnormally enlarged internal mammary chain lymph nodes on either side.  BI-RADS 4: Suspicious.       12/2/2021 Genetic Testing      [employer] Common Hereditary Cancers Panel (47 genes):     Negative       12/15/2021 Biopsy      Bilateral Breast, MR-Guided Biopsy ([facility]):     1.  Right Breast, 6 o'clock, Stereotatic Biopsies for Non-Mass Enhancement:                A.  Benign breast parenchyma with fibroadenomatoid hyperplasia, columnar cell hyperplasia and         stromal fibrosis.   B.  No atypical hyperplasia, in situ nor invasive carcinoma identified.      2.  Left Breast, 3 o'clock, Stereotatic Biopsies for a Mass:                A.  Sclerotic intraductal papilloma.               B.  No atypical hyperplasia, in situ nor invasive carcinoma identified.               Review of Systems:  See interval history.        Medications:  •  acyclovir (ZOVIRAX) 400 MG tablet, TAKE 1 TABLET BY MOUTH TWICE DAILY FOR 5 DAYS AS NEEDED, Disp: , Rfl:   •  cetirizine (zyrTEC) 10 MG tablet, TAKE ONE TABLET BY MOUTH EVERY DAY FOR ITCHING, Disp: , Rfl:   •  escitalopram (LEXAPRO) 20 MG tablet, Take 20 mg by mouth Daily., Disp: , Rfl:   •  hydrOXYzine (ATARAX) 10 MG tablet, TK 1/2 TO 1 T PO Q 6 H PRA, Disp: , Rfl:   •  loratadine-pseudoephedrine (CLARITIN-D 24-hour)  MG per 24 hr tablet, TAKE 1 TABLET BY MOUTH EVERY DAY, Disp: , Rfl:   •  terconazole (TERAZOL 3) 0.8 % vaginal cream, USE 1 APPLICATION VAGINALLY AT BEDTIME FOR 7 NIGHTS, Disp: , Rfl:   •  ZOLMitriptan (ZOMIG) 5 MG tablet, Take 5 mg by mouth., Disp: , Rfl:              Allergies   Allergen Reactions   • Penicillins Nausea Only               Family History   Problem Relation Age of Onset   • Ovarian cancer Maternal Grandmother 64   • Pancreatic  "cancer Maternal Great-Grandmother           PHYSICAL EXAMINATION:   /83 (BP Location: Left arm, Patient Position: Lying)   Pulse 82   Temp 98.7 °F (37.1 °C) (Oral)   Resp 18   Ht 165.1 cm (65\")   Wt 65.6 kg (144 lb 9.6 oz)   LMP 01/04/2022 (Exact Date)   SpO2 99%   Breastfeeding No   BMI 24.06 kg/m²   ECOG 0 - Asymptomatic  General: NAD, well appearing  Psych: a&o x3, normal mood and affect  Eyes: EOMI, no scleral icterus  ENMT: neck supple without masses or thyromegaly, mucous membranes moist  MSK: normal gait, normal ROM in bilateral shoulders  Lymph nodes: no cervical, supraclavicular or axillary lymphadenopathy  Breast: symmetric, moderate size, grade 2 ptosis  Right: No visible abnormalities on inspection while seated, with arms raised or hands on hips. No masses, skin changes, or nipple abnormalities.  Left: No visible abnormalities on inspection while seated, with arms raised or hands on hips. No masses, skin changes, or nipple abnormalities.        I have independently reviewed her imaging and here are my findings:   In the right upper outer breast, there initially was a 10 mm cluster of calcifications. MRI shows a 2.2 cm biopsy cavity at this location without any residual suspicious enhancement.  In the left breast at 3:00, there is an 11 mm irregular enhancing mass on MRI, which represents a biopsy-proven intraductal papilloma.        Assessment:  46 y.o. F with a diagnosis of right breast ductal carcinoma in situ (DCIS), low to intermediate grade, ER/NE+. She also has a new diagnosis of a left breast intraductal papilloma.     Plan:  -Right needle-localized partial mastectomy and left breast needle-localized excisional biopsy with oncoplastic closure.     Enedina Stovall MD  "

## 2022-01-18 NOTE — ANESTHESIA PREPROCEDURE EVALUATION
Anesthesia Evaluation     Patient summary reviewed and Nursing notes reviewed   no history of anesthetic complications:  NPO Solid Status: > 8 hours  NPO Liquid Status: > 4 hours           Airway   Mallampati: II  Dental - normal exam     Pulmonary - normal exam   (+) a smoker Former,   Cardiovascular - negative cardio ROS and normal exam        Neuro/Psych  (+) headaches, psychiatric history Anxiety,     GI/Hepatic/Renal/Endo - negative ROS     Musculoskeletal     Abdominal    Substance History      OB/GYN          Other      history of cancer active                    Anesthesia Plan    ASA 2     general     intravenous induction     Anesthetic plan, all risks, benefits, and alternatives have been provided, discussed and informed consent has been obtained with: patient.        CODE STATUS:

## 2022-01-18 NOTE — OP NOTE
DOS: 1/18/2022  Surgeon: Nestor Zapata MD  Anes: General  Preop dx: right breast cancer; left breast papilloma  Postop dx: same  Procedure: Right breast oncoplastic reconstruction; Left breast symmetry reduction  Drain: none  Specimen: additional left and right breast tissue  EBL: Minimal    History: She presents with anticipated lumpectomy defect in a small breast that will be irradiated. Also she has a bleeding papilloma in the left breast for excision. Radiation will have potential for divots, dimples, contour irregularity and nipple retraction. Oncoplastic reconstruction aims to minimize this risk. Please see my office notes for informed consent.    Description: Markings had been made preoperatively. When Dr. Stovall finished excising the papilloma, I scrubbed in and examined the breast. There was a small cavity immediately deep to the NAC. A superomedial pedicle was de-epithelialized. The keystone stitch of 3-0 monocryl was placed. The NAC rotated into the space and tacked with a stapler. The nipple looked a bit pale and nitropaste was applied. The skin was elevated off the lower pole breast parenchyma and pillars closed with 2-0 vicryl. The skin was tailor-tacked in a Dowd pattern.  The skin was marked and the staples released. The excess skin was trimmed and the IMF was set at its new location approximately 2 cm higher with 2-0 vicryl and closure was done with 3-0 monocryl dermis and 4-0 monocryl subcuticular. The nipple appeared to pink up nicely.  Next the right breast was similarly depithelialized. Tissue was left attached to the pedicle to fill a superolateral defect. The keystone stitch was placed and the NAC was rotated into place.The skin was elevated off the lower pole of the breast and the pillars split. The medial pillar was tucked under the NAC deep and the lateral pillar was brought across and anchored to the chest wall. The skin was tail-tacked.  Markings were made, staples released and then  closure was with 3-0 and 4-0 monocryl. The IMF was also raised approximately 2 cm so as to be even with the contralateral side.  Dermabond was applied to incisions and dressing placed. Counts were correct. She was awakened, extubated and taken to recovery stable.

## 2022-01-18 NOTE — OP NOTE
Operative Report    Patient Name:  Ara Diamond  YOB: 1975    Date of Surgery:  1/18/2022    Pre-op Diagnosis:   Ductal carcinoma in situ (DCIS) of right breast [D05.11]  Intraductal papilloma of left breast [D24.2]       Post-Op Diagnosis:  Ductal carcinoma in situ (DCIS) of right breast [D05.11]  Intraductal papilloma of left breast [D24.2]    Procedures:  1. Left breast needle-localized excisional biopsy  2. Right needle-localized partial mastectomy      Staff:  Surgeon:  Enedina Stovall MD       Anesthesia: General    Estimated Blood Loss: 15 mL    Implants:    Nothing was implanted during the procedure    Specimen:          Specimens     ID Source Type Tests Collected By Collected At Frozen?    A Breast, Left Tissue · TISSUE PATHOLOGY EXAM   Enedina Stovall MD 1/18/22 1148 No    Description: left breast excisional biopsy, ink marks margins, fresh for permanent    B Breast, Right Tissue · TISSUE PATHOLOGY EXAM   Enedina Stovall MD 1/18/22 1209 No    Description: right partial mastectomy, ink marks margins, fresh for permanent, weight = 3g    C Breast, Right Tissue · TISSUE PATHOLOGY EXAM   Enedina Stovall MD 1/18/22 1219 No    Description: additional superior, lateral, and posterior margins, ink marks margins, fresh for permanent, weight = 7g    D Breast, Right Tissue · TISSUE PATHOLOGY EXAM   Enedina Stovall MD 1/18/22 1221 No    Description: additional inferior and medial margins, ink marks margins, fresh for permanent, weight = 4g    E Breast, Left Tissue · TISSUE PATHOLOGY EXAM   Enedina Stovall MD 1/18/22 1319     Description: left breast tissue, fresh for permanent    This specimen was not marked as sent.            Findings:  1. On the left side, specimen radiograph showed the wire and medially migrated clip.  2. On the right side, specimen radiograph showed the wire and clip in good position.     Complications: None     Indications: The patient is 46 y.o.  F with a diagnosis of right breast ductal carcinoma in situ (DCIS), low to intermediate grade, ER/ND+. She also has a new diagnosis of a left breast intraductal papilloma. She is a candidate for lumpectomy and she desires breast conservation. The lesions required preoperative wire-localization. To improve her cosmetic result, this case will be done in conjunction with Dr. Zapata for oncoplastic closure. The risks and benefits of surgery were discussed preoperatively and she understood and agreed to proceed.     Description of Procedure:  Preoperatively the patient was taken to the radiology department and both lesions were localized with a needle/wire. I reviewed the post-localization mammograms to determine the trajectory of the wires. The patient was identified in the preoperative area and brought to the operating room and placed supine on the table. Patient verification was performed and general anesthesia was induced. The patient was prepped and draped in sterile fashion with the wires/needles prepped into the field. A time out was performed and all were in agreement. I confirmed that the patient had received preoperative antibiotics and chemical prophylaxis.     On the skin, I marked the suggested location of the lesions based on the mammographic localization imaging. Preoperatively, Dr. Zapata marked bilateral mastopexy incisions. I began with the left side. The skin was infiltrated with local anesthesia. I made an incision through the lateral aspect of the mastopexy keyhole and carried this down through the dermis with sharp dissection. Flaps were raised according to the planned dissection. An anterior flap was raised first. Dissection was then carried out superiorly, inferiorly, medially, and laterally. The wire was delivered into the wound. The posterior dissection was completed and the specimen removed. While completing the medial dissection, the medially displaced biopsy clip was seen along that margin.  This was removed and sent separately alongside the specimen. The specimen was oriented with paint (red - superior, green - anterior, blue - inferior, yellow - medial, orange - lateral, black - posterior). Specimen radiograph showed the wire and medially migrated clip. The wound was irrigated and hemostasis achieved.    I then proceeded with the right side. The skin was infiltrated with local anesthesia. I made an incision through the upper outer portion of the mastopexy keyhole and carried this down through the dermis with sharp dissection. Flaps were raised according to the planned dissection. An anterior flap was raised first. Dissection was then carried out superiorly, inferiorly, medially, and laterally according to the planned area of resection. The wire was delivered into the wound. The posterior dissection was completed and the specimen removed. The specimen was oriented with paint (red - superior, green - anterior, blue - inferior, yellow - medial, orange - lateral, black - posterior). Specimen radiograph showed the wire and clip in good position. An additional superior, lateral, and posterior margin was taken as a single specimen. An additional inferior and medial margin was taken as a single specimen. These were inked with the corresponding colors and ink marked the true margins. The wound was irrigated and hemostasis achieved. Marking clips were placed in the breast cavity. Dr. Zapata then proceeded with closure. Counts were correct at the end of my portion of the case.    Enedina Stovall MD     Date: 1/18/2022  Time: 13:24 EST

## 2022-01-20 ENCOUNTER — TELEPHONE (OUTPATIENT)
Dept: SURGERY | Facility: CLINIC | Age: 47
End: 2022-01-20

## 2022-01-20 DIAGNOSIS — D05.11 DUCTAL CARCINOMA IN SITU (DCIS) OF RIGHT BREAST: Primary | ICD-10-CM

## 2022-01-20 NOTE — TELEPHONE ENCOUNTER
I called Ms. Diamond to discuss her pathology report. She is recovering well from surgery. I will place referrals for med/onc and rad/onc.     Enedina Stovall MD

## 2022-02-01 ENCOUNTER — TELEPHONE (OUTPATIENT)
Dept: ONCOLOGY | Facility: CLINIC | Age: 47
End: 2022-02-01

## 2022-02-01 NOTE — TELEPHONE ENCOUNTER
Caller: Foreign Diamond    Relationship: Self    Best call back number: [phone]    What is the best time to reach you: ANYTIME     Who are you requesting to speak with (clinical staff, provider,  specific staff member):   SCHEDULING     Do you know the name of the person who called: FOREIGN     What was the call regarding:     SENDING MESSAGE FOR PATIENT HAD REQUESTED IF WAS EARLIER AVAILABILITY FOR LAB /NEW PT APPT DR MORENO ON  02/03, SINCE HAS ANOTHER APPT WITH DR GARIBAY THAT DAY AT 8AM,     CURRENTLY DID NOT HAVE ANY OPENINGS EARLIER, BUT ADVISED WOULD SEND MESSAGE TO SCHEDULING TEAM IF SOMETHING DOES OPEN UP FOR THAT DAY EARLIER IF CAN R/S THIS APPT EARLIER AND ADVISE PATIENT       Do you require a callback: YES IF EARLIER AVAILABILITY OPENS UP TO CONFIRM NEW APPT TIME

## 2022-02-03 ENCOUNTER — APPOINTMENT (OUTPATIENT)
Dept: LAB | Facility: HOSPITAL | Age: 47
End: 2022-02-03

## 2022-02-03 ENCOUNTER — OFFICE VISIT (OUTPATIENT)
Dept: SURGERY | Facility: CLINIC | Age: 47
End: 2022-02-03

## 2022-02-03 VITALS
WEIGHT: 144 LBS | DIASTOLIC BLOOD PRESSURE: 74 MMHG | OXYGEN SATURATION: 98 % | HEIGHT: 65 IN | BODY MASS INDEX: 23.99 KG/M2 | HEART RATE: 79 BPM | RESPIRATION RATE: 17 BRPM | SYSTOLIC BLOOD PRESSURE: 110 MMHG

## 2022-02-03 DIAGNOSIS — Z48.89 POSTOPERATIVE VISIT: Primary | ICD-10-CM

## 2022-02-03 DIAGNOSIS — D05.11 DUCTAL CARCINOMA IN SITU (DCIS) OF RIGHT BREAST: ICD-10-CM

## 2022-02-03 PROCEDURE — 99024 POSTOP FOLLOW-UP VISIT: CPT | Performed by: SURGERY

## 2022-02-03 NOTE — PROGRESS NOTES
[facility]     Referring Provider: Self Referring     Chief complaint:  Postoperative visit      HPI:   12/1/21:  Ms. 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 This was initially detected as an imaging abnormality on routine screening. Her work-up is detailed in the oncologic history below. Prior to the biopsy, she denies any breast lumps, pain, skin changes, or nipple discharge. She denies any prior history of abnormal mammograms or breast biopsies. She denies any family history of breast cancer. She has a family history of ovarian cancer in her maternal grandmother and pancreatic cancer in her maternal great grandmother.     12/17/21:  She underwent genetic testing which was negative for mutation. She then underwent bilateral MR-guided biopsies. This was negative on the right and on the left showed an intraductal papilloma (see report details below).     2/3/22, Interval History:  She underwent right partial mastectomy, and left breast excisional biopsy with oncoplastic closure on 1/18/22. See surgery & pathology details below in oncologic history. She has been recovering well and has no complaints. She was joined today in clinic by her . She gave consent for him to be present during her examination and participate in the discussion.       Oncology/Hematology History Overview Note   09/09/2020, Screening MMG with Jose ([facility]):  Heterogeneously dense. No dominant masses, suspicious microcalcifications or architectural distortions are identified in either breast. Stable exam without mammographic signs of malignancy.  BI-RADS 1: Negative.     Ductal carcinoma in situ (DCIS) of right breast   9/12/2021 Initial Diagnosis    Ductal carcinoma in situ (DCIS) of right breast     9/13/2021 Imaging    Screening MMG with Jose ([facility]):  Heterogeneously dense. There is a  group of calcifications within the upper outer quadrant of the right breast. No additional suspicious calcifications, architectural distortions or mass lesions identified.  BI-RADS 0: Incomplete.     9/24/2021 Imaging    Right Diagnostic MMG with Jose ([facility]):  As noted on the screening mammogram, there are calcifications at the upper aspect of the right breast. The calcifications demonstrate a somewhat amorphous appearance in the CC projection. On the 90 degree view, the majority of the calcifications demonstrate evidence for layering indicating milk of calcium as a benign entity. However, there is a small cluster of calcifications noted at the posterior upper outer aspect of the right breast. This cluster of calcifications does not demonstrate evidence for layering. The findings indicate a suspicious cluster of calcifications. This cluster of calcifications is marked on the images. No suspicious masses or architectural distortions are observed.  BI-RADS 4: Suspicious.     10/28/2021 Biopsy    Right Breast, Stereotactic Biopsy ([facility]):    Right breast, stereotactic biopsy:  1. Ductal carcinoma in situ, cribriform type.  2. Nuclear grade 2 of 3.  3. Maximal dimension of DCIS is 0.3 cm.  4. Central comedo necrosis and calcifications are present.  5. No invasive carcinoma or lymphovascular invasion is identified.  6. The non-neoplastic breast shows fibrous mastopathy, apocrine hyperplasia and mild duct ectasia.    Comment - Two portions of tissue show ductal carcinoma in situ, cribriform type with central necrosis and calcifications.    ER+ (100%, strong)  MS+ (99%, strong)      [facility] PATHOLOGY REVIEW    1. Right Breast, Stereotactic Biopsy:               A. Ductal carcinoma in situ (DCIS):                            1. Low to intermediate grade cribriform DCIS with focal comedo type necrosis.                            2. DCIS focally present measuring up to at  least 3 mm in greatest dimension.                            3. Microcalcification present within DCIS.                            4. Biomarkers ([facility]):                                         Estrogen Receptor: Positive (100%, Douglas 8/8)                                         Progesterone Receptor: Positive (98%, Douglas 7/8)                                         Ki67 ([facility] performed in House) =  5%               B. No invasive carcinoma identified.     Comment: We essentially concur with the pathologist's original submitted diagnosis of intermediate grade cribriform ductal carcinoma in situ with focal comedo type necrosis and microcalcification.  Immunostain for CK5/6 performed at the submitting institution is reviewed demonstrating an intact myoepithelial layer in areas of concern.  The submitted paraffin block will be used for one H&E recut plus immunostain for Ki-67 reported above.  All slides prepared at the submitting institution in addition to their paraffin block will be returned.  Slides performed in house will be retained on file for comparison with the anticipated resection specimen.  This case was shared in internal consultation with Dr. Rogel, who concurred with the above diagnosis.     11/29/2021 Imaging    Bilateral Breast MRI ([facility]):  RIGHT BREAST:    At 11:00, 5.2 cm posterior to the nipple, there is a 0.7 cm AP dimension, 2.2 cm transverse dimension, 1.7 cm craniocaudal dimension peripherally enhancing biopsy cavity/tract with a central focus of susceptibility from a biopsy clip marking the site of biopsy-proven malignancy. No suspicious mass or nonmass enhancement is identified at the biopsy site.  At 6:00 in the posterior right breast, 6.2 cm posterior to the nipple, there is a 0.9 cm AP dimension, 0.8 cm transverse dimension, 0.5 cm craniocaudal dimension focal area of clumped nonmass enhancement, which  is suspicious.  No suspicious enhancement is identified in the right nipple or chest  wall.  The visualized axilla is within normal limits.    LEFT BREAST:    At 3:00 in the anterior left breast, 3.5 cm posterior to the nipple, there is a 1.0 cm AP dimension, 1.1 cm transverse dimension, 0.7 cm craniocaudal dimension enhancing mass with irregular margins, which is suspicious.  No suspicious enhancement is identified in the left nipple or chest wall.  The visualized axilla is within normal limits.   EXTRAMAMMARY FINDINGS:   There are no abnormally enlarged internal mammary chain lymph nodes on either side.  BI-RADS 4: Suspicious.     12/2/2021 Genetic Testing    [employer] Common Hereditary Cancers Panel (47 genes):    Negative     12/15/2021 Biopsy    Bilateral Breast, MR-Guided Biopsy ( [facility]):    1.  Right Breast, 6 o'clock, Stereotatic Biopsies for Non-Mass Enhancement:                A.  Benign breast parenchyma with fibroadenomatoid hyperplasia, columnar cell hyperplasia and         stromal fibrosis.   B.  No atypical hyperplasia, in situ nor invasive carcinoma identified.      2.  Left Breast, 3 o'clock, Stereotatic Biopsies for a Mass:                A.  Sclerotic intraductal papilloma.               B.  No atypical hyperplasia, in situ nor invasive carcinoma identified.       1/18/2022 Surgery    Left breast needle-localized excisional biopsy and right needle-localized partial mastectomy with oncoplastic closure    1.  Breast, Left, Lumpectomy (3 grams):                A.  Focal sclerosis suggesting possible minimal residual sclerotic papilloma identified adjacent to         barbell shaped clip and biopsy site change.   B.  No evidence of atypia, in situ nor infiltrating carcinoma.       2.  Breast, Right, Additional Superior, Lateral and Posterior Margins, Inked and Oriented Excision:                A.  Benign breast parenchyma.     3.  Breast, Right Additional Inferior and Medial Margins, Inked and oriented Excision:                A.  Benign breast parenchyma.      4.  Breast, Right  Lumpectomy (3 grams):                A.  [place] tie clip with biopsy site change identified.                 B.  No evidence of atypia, in situ nor infiltrating carcinoma.       5.  Breast, Left, Reduction Mammoplasty (56 grams):                 A.  Benign skin, subcutaneous tissue and breast parenchyma with duct ectasia, apocrine metaplasia, columnar cell change, sclerosing adenosis and mild ductal hyperplasia.    B.  No evidence atypia, in situ nor infiltrating carcinoma.      6.  Breast, Right Reduction Mammoplasty, (38 grams).                   A.  Benign skin, subcutaneous tissue and breast parenchyma and focal mild duct ectasia, apocrine         metaplasia, and columnar cell change.          Review of Systems:  See interval history.      Medications:    Current Outpatient Medications:   •  acyclovir (ZOVIRAX) 400 MG tablet, TAKE 1 TABLET BY MOUTH TWICE DAILY FOR 5 DAYS AS NEEDED, Disp: , Rfl:   •  cetirizine (zyrTEC) 10 MG tablet, Daily As Needed for Allergies., Disp: , Rfl:   •  escitalopram (LEXAPRO) 20 MG tablet, Take 20 mg by mouth Daily., Disp: , Rfl:   •  hydrOXYzine (ATARAX) 10 MG tablet, TK 1/2 TO 1 T PO Q 6 H PRA, Disp: , Rfl:   •  loratadine-pseudoephedrine (CLARITIN-D 24-hour)  MG per 24 hr tablet, Daily As Needed., Disp: , Rfl:   •  terconazole (TERAZOL 3) 0.8 % vaginal cream, As Needed for Irritation., Disp: , Rfl:   •  Vitamin D, Cholecalciferol, (CHOLECALCIFEROL) 10 MCG (400 UNIT) tablet, Take 400 Units by mouth Daily., Disp: , Rfl:   •  ZOLMitriptan (ZOMIG) 5 MG tablet, Take 5 mg by mouth 1 (One) Time As Needed for Migraine., Disp: , Rfl:       Allergies   Allergen Reactions   • Penicillins Nausea Only       Family History   Problem Relation Age of Onset   • Ovarian cancer Maternal Grandmother 64   • Pancreatic cancer Maternal Great-Grandmother    • Malig Hyperthermia Neg Hx        PHYSICAL EXAMINATION:   Vitals:    02/03/22 0756   BP: 110/74   Pulse: 79   Resp: 17   SpO2: 98%     ECOG 0 -  Asymptomatic  General: NAD, well appearing  Psych: a&o x3, normal mood and affect  Eyes: EOMI, no scleral icterus  ENMT: neck supple without masses or thyromegaly, mucous membranes moist  MSK: normal gait, normal ROM in bilateral shoulders  Lymph nodes: no cervical, supraclavicular or axillary lymphadenopathy  Breast: symmetric, moderate size, grade 2 ptosis  Right: Sp mastopexy with well-healing incisions. Flaps and NAC healthy.  Left: Sp mastopexy with well-healing incisions. There is some dry eschar over the lateral NAC, but this appears viable.      Assessment:  47 y.o. 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 She underwent right partial mastectomy and left breast excisional biopsy with oncoplastic closure on 1/18/22 with benign final pathology on the left and no residual disease found in the right breast (3 mm on core), pTis.    Plan:  -Medical oncology referral. Appointment scheduled with Dr. Ventura on 2/8/22.  -Radiation oncology referral. Appointment scheduled with Dr. Goldman on 2/8/22.  -Continue follow-up with Dr. Zapata.  -Follow-up in 3 months for clinical exam with NP.  -She was instructed to call sooner with any questions, concerns or changes on BSE.    Enedina Stovall MD      CC:  RAOUL Torres PA

## 2022-02-08 ENCOUNTER — CONSULT (OUTPATIENT)
Dept: ONCOLOGY | Facility: CLINIC | Age: 47
End: 2022-02-08

## 2022-02-08 ENCOUNTER — LAB (OUTPATIENT)
Dept: LAB | Facility: HOSPITAL | Age: 47
End: 2022-02-08

## 2022-02-08 VITALS
RESPIRATION RATE: 16 BRPM | HEART RATE: 89 BPM | WEIGHT: 143.8 LBS | HEIGHT: 65 IN | TEMPERATURE: 97.7 F | DIASTOLIC BLOOD PRESSURE: 73 MMHG | OXYGEN SATURATION: 96 % | SYSTOLIC BLOOD PRESSURE: 100 MMHG | BODY MASS INDEX: 23.96 KG/M2

## 2022-02-08 DIAGNOSIS — C50.919 MALIGNANT NEOPLASM OF FEMALE BREAST, UNSPECIFIED ESTROGEN RECEPTOR STATUS, UNSPECIFIED LATERALITY, UNSPECIFIED SITE OF BREAST: Primary | ICD-10-CM

## 2022-02-08 DIAGNOSIS — D05.11 DUCTAL CARCINOMA IN SITU (DCIS) OF RIGHT BREAST: Primary | ICD-10-CM

## 2022-02-08 LAB
BASOPHILS # BLD AUTO: 0.1 10*3/MM3 (ref 0–0.2)
BASOPHILS NFR BLD AUTO: 1.4 % (ref 0–1.5)
DEPRECATED RDW RBC AUTO: 38.6 FL (ref 37–54)
EOSINOPHIL # BLD AUTO: 0.23 10*3/MM3 (ref 0–0.4)
EOSINOPHIL NFR BLD AUTO: 3.2 % (ref 0.3–6.2)
ERYTHROCYTE [DISTWIDTH] IN BLOOD BY AUTOMATED COUNT: 11.8 % (ref 12.3–15.4)
HCT VFR BLD AUTO: 42.5 % (ref 34–46.6)
HGB BLD-MCNC: 15.3 G/DL (ref 12–15.9)
IMM GRANULOCYTES # BLD AUTO: 0.04 10*3/MM3 (ref 0–0.05)
IMM GRANULOCYTES NFR BLD AUTO: 0.6 % (ref 0–0.5)
LYMPHOCYTES # BLD AUTO: 2.47 10*3/MM3 (ref 0.7–3.1)
LYMPHOCYTES NFR BLD AUTO: 34.8 % (ref 19.6–45.3)
MCH RBC QN AUTO: 32.3 PG (ref 26.6–33)
MCHC RBC AUTO-ENTMCNC: 36 G/DL (ref 31.5–35.7)
MCV RBC AUTO: 89.9 FL (ref 79–97)
MONOCYTES # BLD AUTO: 0.62 10*3/MM3 (ref 0.1–0.9)
MONOCYTES NFR BLD AUTO: 8.7 % (ref 5–12)
NEUTROPHILS NFR BLD AUTO: 3.63 10*3/MM3 (ref 1.7–7)
NEUTROPHILS NFR BLD AUTO: 51.3 % (ref 42.7–76)
NRBC BLD AUTO-RTO: 0 /100 WBC (ref 0–0.2)
PLATELET # BLD AUTO: 214 10*3/MM3 (ref 140–450)
PMV BLD AUTO: 11.3 FL (ref 6–12)
RBC # BLD AUTO: 4.73 10*6/MM3 (ref 3.77–5.28)
WBC NRBC COR # BLD: 7.09 10*3/MM3 (ref 3.4–10.8)

## 2022-02-08 PROCEDURE — 36415 COLL VENOUS BLD VENIPUNCTURE: CPT

## 2022-02-08 PROCEDURE — 85025 COMPLETE CBC W/AUTO DIFF WBC: CPT

## 2022-02-08 PROCEDURE — 99205 OFFICE O/P NEW HI 60 MIN: CPT | Performed by: INTERNAL MEDICINE

## 2022-02-08 RX ORDER — TAMOXIFEN CITRATE 20 MG/1
20 TABLET ORAL DAILY
Qty: 30 TABLET | Refills: 5 | Status: SHIPPED | OUTPATIENT
Start: 2022-02-08 | End: 2022-02-10 | Stop reason: SDUPTHER

## 2022-02-08 NOTE — PROGRESS NOTES
Subjective     REASON FOR CONSULTATION: Newly diagnosed DCIS  Provide an opinion on any further workup or treatment                             REQUESTING PHYSICIAN: Enedina Stovall    RECORDS OBTAINED:  Records of the patients history including those obtained from the referring provider were reviewed and summarized in detail.    HISTORY OF PRESENT ILLNESS:  The patient is a 47 y.o. year old female who is here for an opinion about the above issue.    History of Present Illness     Patient is a 47-year-old female who had been recently diagnosed with right breast ductal carcinoma in situ.  This was detected on a routine screening mammogram.  There is no family history of breast cancer but there is family history of ovarian cancer in maternal grandmother and pancreatic cancer in her maternal great-grandmother.  Patient is referred here for further options of treatment.    Details are as follows    September 13, 2021: Screening mammogram    There is a group of calcifications within the upper outer quadrant of the right breast, no other calcifications are clear architectural distortion or mass is identified    September 24, 2021: Right breast diagnostic mammogram and ultrasound    Calcifications in the upper aspect of the right breast are present.  The calcifications demonstrate amorphous appearance on the CC projection.  On the 90 degree view the majority of the calcifications show evidence of layering indicating milk or calcium as a benign entity.  However there is a small cluster of calcification noted in the posterior upper outer aspect of the right breast.  The cluster of calcifications does not demonstrate evidence for layering.  These indicate a suspicious cluster of calcifications.  No suspicious masses or architectural distortions are seen    October 20, 2021: Right breast stereotactic biopsy done at [facility]  Right breast stereotactic biopsy  1.  Ductal carcinoma in situ, cribriform type  2.   Nuclear grade 2-3  Maximum dimension of DCIS is 0.3 cm  4.  Central comedonecrosis and calcifications are present  5.  No invasive carcinoma or lymphovascular space invasion is identified.  6.  The nonneoplastic breast shows fibrous mastopathy, apocrine hyperplasia and mild duct ectasia.    Comment-2 portions of tissue showed ductal carcinoma in situ, cribriform type with central necrosis and calcifications  ER: 100%  SC: 99%  Ki-67 5%    November 29, 2021: Bilateral breast MRI  IMPRESSION AND RECOMMENDATION:     1.  A 2.2 cm biopsy cavity/tract at 11:00 in the right breast represents  the site of biopsy-proven malignancy. Surgical management is recommended  with preoperative localization targeting the biopsy clip and residual  calcifications noted lateral to the biopsy clip on the post biopsy  mammogram. These have been marked on the post biopsy mammogram.  2.  Suspicious 0.9 cm nonmass enhancement at 6:00 in the right breast.  Recommend further evaluation with MRI guided core needle biopsy.  3.  Suspicious 1.1 cm enhancing mass at 3:00 in the left breast.  Recommend further evaluation with MRI guided core needle biopsy.       12/2/2021: Genetic testing  INVITAE genetic test 47 genes negative    December 15, 2021: Bilateral breast MR guided biopsy  1.  Right breast 6 o'clock position, stereotactic biopsy for non-mass enhancement  A.  Benign breast parenchyma with fibroadenomatoid hyperplasia, columnar cell hyperplasia and stromal fibrosis    B.  No atypical hyperplasia, in situ or invasive carcinoma identified    2.  Left breast 3 o'clock position stereotactic biopsy for a mass  A.  Still sclerotic intraductal papilloma  B.  No atypical hyperplasia, in situ or invasive carcinoma identified    January 18, 2022: Left breast needle localized excisional biopsy and right needle localized partial mastectomy with oncoplastic closure    Final Diagnosis   1.  Breast, Left, Lumpectomy (3 grams):                A.  Focal  sclerosis suggesting possible minimal residual sclerotic papilloma identified adjacent to         barbell shaped clip and biopsy site change.   B.  No evidence of atypia, in situ nor infiltrating carcinoma.       2.  Breast, Right, Additional Superior, Lateral and Posterior Margins, Inked and Oriented Excision:                A.  Benign breast parenchyma.     3.  Breast, Right Additional Inferior and Medial Margins, Inked and oriented Excision:                A.  Benign breast parenchyma.      4.  Breast, Right Lumpectomy (3 grams):                A.  Congress tie clip with biopsy site change identified.                 B.  No evidence of atypia, in situ nor infiltrating carcinoma.       5.  Breast, Left, Reduction Mammoplasty (56 grams):                 A.  Benign skin, subcutaneous tissue and breast parenchyma with duct ectasia, apocrine metaplasia,         columnar cell change, sclerosing adenosis and mild ductal hyperplasia.    B.  No evidence atypia, in situ nor infiltrating carcinoma.      6.  Breast, Right Reduction Mammoplasty, (38 grams).                   A.  Benign skin, subcutaneous tissue and breast parenchyma and focal mild duct ectasia, apocrine         metaplasia, and columnar cell change.      mec/brb     Patient is here for further evaluation and treatment.  Patient has appointment with Dr. Beatris Ugarte on February 9, 2020          Past Medical History:   Diagnosis Date   • Anxiety    • Depression    • Ductal carcinoma in situ (DCIS) of right breast 2021   • Migraine    • Seasonal allergies         Past Surgical History:   Procedure Laterality Date   • ABDOMINOPLASTY  2016   • BREAST LUMPECTOMY Right 1/18/2022    Procedure: Right needle-localized partial mastectomy,Left breast needle-localized excisional biopsy;  Surgeon: Enedina Stovall MD;  Location: [facility];  Service: General;  Laterality: Right;   • BREAST SURGERY Bilateral 1/18/2022    Procedure: RIGHT BREAST ONCOPLASTIC  RECONSTRUCTION WITH SYMMETRY PROCEDURE;  Surgeon: Nestor Zapata MD;  Location: [facility] OR;  Service: Plastics;  Laterality: Bilateral;   • LAPAROSCOPIC CHOLECYSTECTOMY     • OTHER SURGICAL HISTORY      Arm Surgery (Skin Removal)        Current Outpatient Medications on File Prior to Visit   Medication Sig Dispense Refill   • acyclovir (ZOVIRAX) 400 MG tablet TAKE 1 TABLET BY MOUTH TWICE DAILY FOR 5 DAYS AS NEEDED     • cetirizine (zyrTEC) 10 MG tablet Daily As Needed for Allergies.     • escitalopram (LEXAPRO) 20 MG tablet Take 20 mg by mouth Daily.     • hydrOXYzine (ATARAX) 10 MG tablet TK 1/2 TO 1 T PO Q 6 H PRA     • Vitamin D, Cholecalciferol, (CHOLECALCIFEROL) 10 MCG (400 UNIT) tablet Take 400 Units by mouth Daily.     • ZOLMitriptan (ZOMIG) 5 MG tablet Take 5 mg by mouth As Needed for Migraine.     • loratadine-pseudoephedrine (CLARITIN-D 24-hour)  MG per 24 hr tablet Daily As Needed.     • terconazole (TERAZOL 3) 0.8 % vaginal cream As Needed for Irritation.       No current facility-administered medications on file prior to visit.        ALLERGIES:    Allergies   Allergen Reactions   • Penicillins Nausea Only        Social History     Socioeconomic History   • Marital status:      Spouse name: Addison   • Number of children: 2   Tobacco Use   • Smoking status: Former Smoker     Packs/day: 1.00     Years: 15.00     Pack years: 15.00     Types: Cigarettes     Quit date:      Years since quittin.1   • Smokeless tobacco: Never Used   Vaping Use   • Vaping Use: Never used   Substance and Sexual Activity   • Alcohol use: No   • Drug use: No   • Sexual activity: Defer        Family History   Problem Relation Age of Onset   • Ovarian cancer Maternal Grandmother 64   • Pancreatic cancer Maternal Great-Grandmother    • Malig Hyperthermia Neg Hx         Review of Systems   Constitutional: Negative for appetite change, chills, diaphoresis, fatigue, fever and unexpected weight change.   HENT:  "Negative for hearing loss, sore throat and trouble swallowing.    Respiratory: Negative for cough, chest tightness, shortness of breath and wheezing.    Cardiovascular: Negative for chest pain, palpitations and leg swelling.   Gastrointestinal: Negative for abdominal distention, abdominal pain, constipation, diarrhea, nausea and vomiting.   Genitourinary: Negative for dysuria, frequency, hematuria and urgency.   Musculoskeletal: Negative for joint swelling.        No muscle weakness.   Skin: Negative for rash and wound.   Neurological: Negative for seizures, syncope, speech difficulty, weakness, numbness and headaches.   Hematological: Negative for adenopathy. Does not bruise/bleed easily.   Psychiatric/Behavioral: Negative for behavioral problems, confusion and suicidal ideas.   All other systems reviewed and are negative.       Objective     Vitals:    02/08/22 0821   BP: 100/73   Pulse: 89   Resp: 16   Temp: 97.7 °F (36.5 °C)   TempSrc: Temporal   SpO2: 96%   Weight: 65.2 kg (143 lb 12.8 oz)   Height: 164.4 cm (64.72\")  Comment: New Ht No Shoes   PainSc: 0-No pain     Current Status 2/8/2022   ECOG score 0       Physical Exam      CONSTITUTIONAL:  Vital signs reviewed.  No distress, looks comfortable.  EYES:  Conjunctivae and lids unremarkable.  PERRLA  EARS,NOSE,MOUTH,THROAT:  Ears and nose appear unremarkable.  Lips, teeth, gums appear unremarkable.  RESPIRATORY:  Normal respiratory effort.  Lungs clear to auscultation bilaterally.  BREAST: Right breast: S/p right lumpectomy, healing well no skin changes, no evidence of breast mass, no nipple discharge, no evidence of any right axillary adenopathy or right supraclavicular adenopathy  Left breast: Left breast excision with oncoplastic closure, healing up well no evidence of any skin changes, no evidence of any left breast mass and no evidence of left nipple discharge as well as no left axillary adenopathy or left supraclavicular adenopathy.  CARDIOVASCULAR:  " Normal S1, S2.  No murmurs rubs or gallops.  No significant lower extremity edema.  GASTROINTESTINAL: Abdomen appears unremarkable.  Nontender.  No hepatomegaly.  No splenomegaly.  LYMPHATIC:  No cervical, supraclavicular, axillary lymphadenopathy.  SKIN:  Warm.  No rashes.  PSYCHIATRIC:  Normal judgment and insight.  Normal mood and affect.    RECENT LABS:  Hematology WBC   Date Value Ref Range Status   02/08/2022 7.09 3.40 - 10.80 10*3/mm3 Final   10/30/2019 5.24 4.5 - 11.0 10*3/uL Final     RBC   Date Value Ref Range Status   02/08/2022 4.73 3.77 - 5.28 10*6/mm3 Final   10/30/2019 4.44 4.0 - 5.2 10*6/uL Final     Hemoglobin   Date Value Ref Range Status   02/08/2022 15.3 12.0 - 15.9 g/dL Final   10/30/2019 14.2 12.0 - 16.0 g/dL Final     Hematocrit   Date Value Ref Range Status   02/08/2022 42.5 34.0 - 46.6 % Final   10/30/2019 43.7 36.0 - 46.0 % Final     Platelets   Date Value Ref Range Status   02/08/2022 214 140 - 450 10*3/mm3 Final     Comment:     Some plt clumping   10/30/2019 222 140 - 440 10*3/uL Final          Assessment/Plan     *Tis N0, stage 0 right breast DCIS, 3 mm, nuclear grade 2, no invasive carcinoma lymphovascular space invasion seen, %, MN 99%, Ki-67 5% s/p right partial mastectomy and left breast excisional biopsy with oncoplastic closure on January 18, 2022.  Final pathology on the left was benign and there was no residual disease found in the right breast at surgery.  · Patient is eligible for chemoprophylaxis with tamoxifen as she is premenopausal  · Discussed the side effects of endocrine therapy  · Tamoxifen causes hot flashes, rare chance for uterine cancer, blood clots, DVT, PE, hair thinning, rare chance for thrombocytopenia, cataracts.  Aromatase inhibitors can cause arthralgias, hot flashes, decrease in bone density, rare chance for diarrhea, also discussed about hot flashes with it.  Evista is approved for osteopenia and can also be used for prophylaxis with fewer side  effects except hot flashes and rare chance for cataracts but it can improve bone density.   · As patient is premenopausal will go ahead and start her on tamoxifen  · Patient has follow-up with radiation oncology Dr. Beatris Ugarte  · Follow-up with me in 10 weeks with labs    Plan  · Reviewed the screening mammogram, diagnostic mammogram ultrasound, MRI of the breast and pathology and discussed with patient in length  · Reviewed the path results on the actual surgery which did not show evidence of any malignancy including DCIS.  DCIS was present in the initial biopsy and was only 3 mm  · Patient was to follow-up with radiation oncology tomorrow  · We discussed in length that she is a candidate for endocrine therapy  · We will plan to start tamoxifen 20 mg once daily.  Discussed in length side effects of tamoxifen  · Follow-up with me in 10 weeks with labs in order to see if she will tolerate      Thank you for allowing me to participate in the care of this patient     I spent 60 total minutes, face-to-face, caring for Ara today.  Greater than 50% of this time involved counseling and/or coordination of care as documented within this note.    MD Dr. Enedina Jorge Dr.

## 2022-02-09 ENCOUNTER — PATIENT ROUNDING (BHMG ONLY) (OUTPATIENT)
Dept: ONCOLOGY | Facility: CLINIC | Age: 47
End: 2022-02-09

## 2022-02-09 ENCOUNTER — APPOINTMENT (OUTPATIENT)
Dept: RADIATION ONCOLOGY | Facility: HOSPITAL | Age: 47
End: 2022-02-09

## 2022-02-09 ENCOUNTER — CONSULT (OUTPATIENT)
Dept: RADIATION ONCOLOGY | Facility: HOSPITAL | Age: 47
End: 2022-02-09

## 2022-02-09 VITALS — DIASTOLIC BLOOD PRESSURE: 77 MMHG | SYSTOLIC BLOOD PRESSURE: 110 MMHG | OXYGEN SATURATION: 97 % | HEART RATE: 88 BPM

## 2022-02-09 DIAGNOSIS — D05.11 DUCTAL CARCINOMA IN SITU (DCIS) OF RIGHT BREAST: Primary | ICD-10-CM

## 2022-02-09 PROCEDURE — 99204 OFFICE O/P NEW MOD 45 MIN: CPT | Performed by: RADIOLOGY

## 2022-02-09 PROCEDURE — G0463 HOSPITAL OUTPT CLINIC VISIT: HCPCS | Performed by: RADIOLOGY

## 2022-02-09 NOTE — PROGRESS NOTES
A My-Chart message has been sent to the patient for PATIENT ROUNDING with [facility].

## 2022-02-09 NOTE — PROGRESS NOTES
Subjective     Enedina Stovall MD    Cancer Staging      CC:DCIS right breast, intermediate grade, 3mm on core bx with no residual disease on lumpectomy specimen, ER CO pos                                 Dear Enedina Stovall MD    I had the pleasure of seeing Ara Diamond  today in the [facility].   The patient is a 47 y.o. female with recently diagnosed right breast ductal carcinoma in situ.  She had a screening mammogram on 21 which showed dense breast tissue and group of calcifications in uoq right breast, birads 0.  She had a right breast diagnostic mammogram on 21 which showed suspicious cluster of microcalcifications uoq right breast.  She had a stereotactic biopsy on 10/28/21 with pathology revealing low to intermediate grade DCIS measuring up to 3mm with focal comedo necrosis, % CO 98% ki67 5%.      She had a breast mri on 21 which showed a 2.2 cm biopsy cavity 11 oclock right breast at biopsy proven site of malignancy and a suspicious 9mm nonmass enhancement at 6 oclock right breast with mri guided bx recommended as well as a 1.1cm enhancing mass left breast 3 oclock with mri guided biopsy recommended.  She had mri guided biopsy of right breast 6 oclock which was benign and mri guided biopsy of 3 oclock left breast which revealed sclerotic intraductal papilloma.  She also underwent oncoplastic closure.      She underwent a right breast lumpectomy and left breast excisional biopsy on 22 with pathology revealing no residual insitu or invasive disease in right breast and no insitu or invasive disease in left breast.  She met with Dr. Ventura with medical oncology and she has recommended tamoxifen.    She is  with menarche age 12 and first childbirth age 17.  She is premnopausal and has never takne hormone replacement therapy. She had invitae genetic testing which was negative.   She has a family hx of ovarian cancer in her maternal grandmother and pancreatic  cancer in her maternal great grandmother.      She is recovering well from her surgery and is referred today for evaluation for adjuvant radiation.         Review of Systems   Constitutional: Negative.    HENT: Negative.    Respiratory: Negative.    Cardiovascular: Negative.    Gastrointestinal: Negative.    Genitourinary: Negative.    Musculoskeletal: Negative.    Skin: Negative.    Neurological: Negative.          Past Medical History:   Diagnosis Date   • Anxiety    • Depression    • Ductal carcinoma in situ (DCIS) of right breast    • Migraine    • Seasonal allergies          Past Surgical History:   Procedure Laterality Date   • ABDOMINOPLASTY  2016   • BREAST LUMPECTOMY Right 2022    Procedure: Right needle-localized partial mastectomy,Left breast needle-localized excisional biopsy;  Surgeon: Enedina Stovall MD;  Location: [facility];  Service: General;  Laterality: Right;   • BREAST SURGERY Bilateral 2022    Procedure: RIGHT BREAST ONCOPLASTIC RECONSTRUCTION WITH SYMMETRY PROCEDURE;  Surgeon: Nestor Zapata MD;  Location: [facility];  Service: Plastics;  Laterality: Bilateral;   • LAPAROSCOPIC CHOLECYSTECTOMY     • OTHER SURGICAL HISTORY      Arm Surgery (Skin Removal)         Social History     Socioeconomic History   • Marital status:      Spouse name: Addison   • Number of children: 2   Tobacco Use   • Smoking status: Former Smoker     Packs/day: 1.00     Years: 15.00     Pack years: 15.00     Types: Cigarettes     Quit date:      Years since quittin.1   • Smokeless tobacco: Never Used   Vaping Use   • Vaping Use: Never used   Substance and Sexual Activity   • Alcohol use: No   • Drug use: No   • Sexual activity: Defer         Family History   Problem Relation Age of Onset   • Ovarian cancer Maternal Grandmother 64   • Pancreatic cancer Maternal Great-Grandmother    • Malig Hyperthermia Neg Hx           Objective    Physical Exam  Constitutional:        Appearance: Normal appearance.   HENT:      Head: Atraumatic.   Chest:          Comments: Healing mastopexy incisions bilaterally, no palpable masses in either breast or axilla.   Musculoskeletal:         General: Normal range of motion.   Neurological:      General: No focal deficit present.      Mental Status: She is alert.   Psychiatric:         Mood and Affect: Mood normal.           Current Outpatient Medications on File Prior to Visit   Medication Sig Dispense Refill   • acyclovir (ZOVIRAX) 400 MG tablet TAKE 1 TABLET BY MOUTH TWICE DAILY FOR 5 DAYS AS NEEDED     • cetirizine (zyrTEC) 10 MG tablet Daily As Needed for Allergies.     • escitalopram (LEXAPRO) 20 MG tablet Take 20 mg by mouth Daily.     • hydrOXYzine (ATARAX) 10 MG tablet TK 1/2 TO 1 T PO Q 6 H PRA     • loratadine-pseudoephedrine (CLARITIN-D 24-hour)  MG per 24 hr tablet Daily As Needed.     • tamoxifen (NOLVADEX) 20 MG chemo tablet Take 1 tablet by mouth Daily for 30 days. 30 tablet 5   • terconazole (TERAZOL 3) 0.8 % vaginal cream As Needed for Irritation.     • Vitamin D, Cholecalciferol, (CHOLECALCIFEROL) 10 MCG (400 UNIT) tablet Take 400 Units by mouth Daily.     • ZOLMitriptan (ZOMIG) 5 MG tablet Take 5 mg by mouth As Needed for Migraine.       No current facility-administered medications on file prior to visit.       ALLERGIES:    Allergies   Allergen Reactions   • Penicillins Nausea Only       There were no vitals taken for this visit.     (0) Fully active, able to carry on all predisease performance without restriction  Current Status 2/8/2022   ECOG score 0         Assessment/Plan     47 year old female with intermediate grade DCIS of the right breast, 3mm on biopsy with no residual disease on lumpectomy specimen, ER WI Pos Her 2 neg.  I discussed with Mrs. Diamond that lumpectomy for DCIS is typically followed by adjuvant radiation to reduce the risk of local recurrence, however, in her specific case, I think her risk of local  recurrence is extremely low based on the small size of dcis, no residual disease on lumpectomy specimen therefore widely negative margins and strong estrogen receptor positivity.  According to the [facility] [place] nomogram for DCIS risk of recurrence, her risk of local recurrence at 10years is approximately 10%.  I explained that radiation would reduce the risk in half to approximately 5% at 10 years but no change in overall survival.      I discussed with her the possible risks, benefits and rationale of radiation therapy to the right breast to include but not limited to the following:    Acute: skin erythema, breakdown/moist desquamation, swelling or discomfort of the breast, fatigue, pneumonitis resulting in shortness of breath, cough or pain    Late: Permanent skin changes including hyperpigmentation, telangiectasias, fibrosis of the breast resulting in smaller size or poor cosmetic outcome, late edema or cellulitis, late rib fracture, late pulmonary fibrosis and the remote risk of second malignancies.      She voiced understanding and was given an opportunity to ask questions which I believe were answered to her satisfaction.  At the end of our conversation, she elected to not proceed with radiation.  I have not given her a follow up with me but I asked her to call with any questions or concerns in the future.  She will have regular follow up with Dr. Stovall and Dr. Ventura.     I personally spent greater than 45 minutes today assessing, managing, discussing and documenting my visit with the patient. That time includes review of records, imaging and pathology reports, obtaining my own history, performing a medically appropriate evaluation, counseling and educating the patient, discussing goals, logistics, alternatives and risks of my recommendations, surveillance options and potential outcomes. It also includes the time documenting the clinical information in the EMR and communicating my  recommendations to the other involved physicians.                Thank you very much for allowing me to participate in the care of this very pleasant patient.    Sincerely,      Beatris Goldman MD

## 2022-02-10 DIAGNOSIS — D05.11 DUCTAL CARCINOMA IN SITU (DCIS) OF RIGHT BREAST: Primary | ICD-10-CM

## 2022-02-10 RX ORDER — TAMOXIFEN CITRATE 20 MG/1
20 TABLET ORAL DAILY
Qty: 90 TABLET | Refills: 1 | Status: SHIPPED | OUTPATIENT
Start: 2022-02-10 | End: 2022-03-12

## 2022-03-07 ENCOUNTER — TELEPHONE (OUTPATIENT)
Dept: ONCOLOGY | Facility: CLINIC | Age: 47
End: 2022-03-07

## 2022-03-07 DIAGNOSIS — B37.31 VAGINAL YEAST INFECTION: Primary | ICD-10-CM

## 2022-03-07 RX ORDER — FLUCONAZOLE 100 MG/1
100 TABLET ORAL DAILY
Qty: 5 TABLET | Refills: 0 | Status: SHIPPED | OUTPATIENT
Start: 2022-03-07 | End: 2022-03-12

## 2022-03-07 NOTE — TELEPHONE ENCOUNTER
Returned call to patient who is reporting vaginal itching, she stated that she started on Tamoxifen on 2/27/2022 and then the itching started.  She wants to know if there is anything she can do about it.  She has no vaginal dryness or discharge currently.  She stated that it is not a yeast infection.  Wants to know if this will go on for five years while she is on the medication or if it will go away?  She is seeing a new gynecologist on Thursday to establish care, but in the meantime is there any thing that she can do.

## 2022-03-07 NOTE — TELEPHONE ENCOUNTER
Call back to MsNeil Dara to let her know that Dr. Ventura is prescribing Difucan 100 mg daily for 5 days. Let her know that after her appointment with the gynecologist, if it is decided not to be a yeast infection, she can stop taking the Diflucan.  Patient verbalized understanding.  Let her know that Dr. Ventura wanted to do a telephone visit with her on 3/23/22 at 8:40 AM.  Patient verbalized understanding and agreement.

## 2022-03-07 NOTE — TELEPHONE ENCOUNTER
Patient is having itching to her vaginal area after starting Tamoxifen.  Will this go away and what can she taker for it

## 2022-04-25 ENCOUNTER — LAB (OUTPATIENT)
Dept: LAB | Facility: HOSPITAL | Age: 47
End: 2022-04-25

## 2022-04-25 ENCOUNTER — OFFICE VISIT (OUTPATIENT)
Dept: ONCOLOGY | Facility: CLINIC | Age: 47
End: 2022-04-25

## 2022-04-25 VITALS
HEART RATE: 82 BPM | BODY MASS INDEX: 24.19 KG/M2 | OXYGEN SATURATION: 100 % | RESPIRATION RATE: 16 BRPM | SYSTOLIC BLOOD PRESSURE: 112 MMHG | HEIGHT: 65 IN | WEIGHT: 145.2 LBS | DIASTOLIC BLOOD PRESSURE: 79 MMHG | TEMPERATURE: 97.5 F

## 2022-04-25 DIAGNOSIS — D05.10 DUCTAL CARCINOMA IN SITU (DCIS) OF BREAST, UNSPECIFIED LATERALITY: Primary | ICD-10-CM

## 2022-04-25 DIAGNOSIS — D05.11 DUCTAL CARCINOMA IN SITU (DCIS) OF RIGHT BREAST: ICD-10-CM

## 2022-04-25 LAB
ALBUMIN SERPL-MCNC: 3.8 G/DL (ref 3.5–5.2)
ALBUMIN/GLOB SERPL: 1.3 G/DL (ref 1.1–2.4)
ALP SERPL-CCNC: 67 U/L (ref 38–116)
ALT SERPL W P-5'-P-CCNC: 11 U/L (ref 0–33)
ANION GAP SERPL CALCULATED.3IONS-SCNC: 7.6 MMOL/L (ref 5–15)
AST SERPL-CCNC: 18 U/L (ref 0–32)
BASOPHILS # BLD AUTO: 0.07 10*3/MM3 (ref 0–0.2)
BASOPHILS NFR BLD AUTO: 1 % (ref 0–1.5)
BILIRUB SERPL-MCNC: 0.2 MG/DL (ref 0.2–1.2)
BUN SERPL-MCNC: 12 MG/DL (ref 6–20)
BUN/CREAT SERPL: 16 (ref 7.3–30)
CALCIUM SPEC-SCNC: 8.8 MG/DL (ref 8.5–10.2)
CHLORIDE SERPL-SCNC: 106 MMOL/L (ref 98–107)
CO2 SERPL-SCNC: 27.4 MMOL/L (ref 22–29)
CREAT SERPL-MCNC: 0.75 MG/DL (ref 0.6–1.1)
DEPRECATED RDW RBC AUTO: 41.1 FL (ref 37–54)
EGFRCR SERPLBLD CKD-EPI 2021: 97.7 ML/MIN/1.73
EOSINOPHIL # BLD AUTO: 0.18 10*3/MM3 (ref 0–0.4)
EOSINOPHIL NFR BLD AUTO: 2.6 % (ref 0.3–6.2)
ERYTHROCYTE [DISTWIDTH] IN BLOOD BY AUTOMATED COUNT: 12.4 % (ref 12.3–15.4)
GLOBULIN UR ELPH-MCNC: 2.9 GM/DL (ref 1.8–3.5)
GLUCOSE SERPL-MCNC: 94 MG/DL (ref 74–124)
HCT VFR BLD AUTO: 38.8 % (ref 34–46.6)
HGB BLD-MCNC: 13.7 G/DL (ref 12–15.9)
IMM GRANULOCYTES # BLD AUTO: 0.04 10*3/MM3 (ref 0–0.05)
IMM GRANULOCYTES NFR BLD AUTO: 0.6 % (ref 0–0.5)
LYMPHOCYTES # BLD AUTO: 2.75 10*3/MM3 (ref 0.7–3.1)
LYMPHOCYTES NFR BLD AUTO: 39.9 % (ref 19.6–45.3)
MCH RBC QN AUTO: 32.5 PG (ref 26.6–33)
MCHC RBC AUTO-ENTMCNC: 35.3 G/DL (ref 31.5–35.7)
MCV RBC AUTO: 92.2 FL (ref 79–97)
MONOCYTES # BLD AUTO: 0.43 10*3/MM3 (ref 0.1–0.9)
MONOCYTES NFR BLD AUTO: 6.2 % (ref 5–12)
NEUTROPHILS NFR BLD AUTO: 3.43 10*3/MM3 (ref 1.7–7)
NEUTROPHILS NFR BLD AUTO: 49.7 % (ref 42.7–76)
NRBC BLD AUTO-RTO: 0 /100 WBC (ref 0–0.2)
PLATELET # BLD AUTO: 231 10*3/MM3 (ref 140–450)
PMV BLD AUTO: 9.4 FL (ref 6–12)
POTASSIUM SERPL-SCNC: 3.7 MMOL/L (ref 3.5–4.7)
PROT SERPL-MCNC: 6.7 G/DL (ref 6.3–8)
RBC # BLD AUTO: 4.21 10*6/MM3 (ref 3.77–5.28)
SODIUM SERPL-SCNC: 141 MMOL/L (ref 134–145)
WBC NRBC COR # BLD: 6.9 10*3/MM3 (ref 3.4–10.8)

## 2022-04-25 PROCEDURE — 85025 COMPLETE CBC W/AUTO DIFF WBC: CPT

## 2022-04-25 PROCEDURE — 36415 COLL VENOUS BLD VENIPUNCTURE: CPT

## 2022-04-25 PROCEDURE — 99214 OFFICE O/P EST MOD 30 MIN: CPT | Performed by: INTERNAL MEDICINE

## 2022-04-25 PROCEDURE — 80053 COMPREHEN METABOLIC PANEL: CPT

## 2022-04-25 RX ORDER — TAMOXIFEN CITRATE 20 MG/1
1 TABLET ORAL DAILY
COMMUNITY
Start: 2022-03-10 | End: 2022-06-13

## 2022-04-25 RX ORDER — TRIAMCINOLONE ACETONIDE 0.1 %
PASTE (GRAM) DENTAL AS NEEDED
COMMUNITY
Start: 2022-03-11

## 2022-04-25 NOTE — PROGRESS NOTES
Subjective     REASON FOR follow-up:     Tis N0, stage 0 right breast DCIS, 3 mm, nuclear grade 2, no invasive carcinoma lymphovascular space invasion seen, %, CA 99%, Ki-67 5% s/p right partial mastectomy and left breast excisional biopsy with oncoplastic closure on January 18, 2022.  Final pathology on the left was benign and there was no residual disease found in the right breast at surgery.    · Started tamoxifen      History of Present Illness     Patient is a 47-year-old female with a history of DCIS currently on tamoxifen and tolerating well.  She does not have hot flashes and just not gained weight.  She is got a reasonable appetite        Oncologic history:  Patient is a 47-year-old female who had been recently diagnosed with right breast ductal carcinoma in situ.  This was detected on a routine screening mammogram.  There is no family history of breast cancer but there is family history of ovarian cancer in maternal grandmother and pancreatic cancer in her maternal great-grandmother.  Patient is referred here for further options of treatment.    Details are as follows    September 13, 2021: Screening mammogram    There is a group of calcifications within the upper outer quadrant of the right breast, no other calcifications are clear architectural distortion or mass is identified    September 24, 2021: Right breast diagnostic mammogram and ultrasound    Calcifications in the upper aspect of the right breast are present.  The calcifications demonstrate amorphous appearance on the CC projection.  On the 90 degree view the majority of the calcifications show evidence of layering indicating milk or calcium as a benign entity.  However there is a small cluster of calcification noted in the posterior upper outer aspect of the right breast.  The cluster of calcifications does not demonstrate evidence for layering.  These indicate a suspicious cluster of calcifications.  No suspicious masses or  architectural distortions are seen    October 20, 2021: Right breast stereotactic biopsy done at [facility]  Right breast stereotactic biopsy  1.  Ductal carcinoma in situ, cribriform type  2.  Nuclear grade 2-3  Maximum dimension of DCIS is 0.3 cm  4.  Central comedonecrosis and calcifications are present  5.  No invasive carcinoma or lymphovascular space invasion is identified.  6.  The nonneoplastic breast shows fibrous mastopathy, apocrine hyperplasia and mild duct ectasia.    Comment-2 portions of tissue showed ductal carcinoma in situ, cribriform type with central necrosis and calcifications  ER: 100%  OH: 99%  Ki-67 5%    November 29, 2021: Bilateral breast MRI  IMPRESSION AND RECOMMENDATION:     1.  A 2.2 cm biopsy cavity/tract at 11:00 in the right breast represents  the site of biopsy-proven malignancy. Surgical management is recommended  with preoperative localization targeting the biopsy clip and residual  calcifications noted lateral to the biopsy clip on the post biopsy  mammogram. These have been marked on the post biopsy mammogram.  2.  Suspicious 0.9 cm nonmass enhancement at 6:00 in the right breast.  Recommend further evaluation with MRI guided core needle biopsy.  3.  Suspicious 1.1 cm enhancing mass at 3:00 in the left breast.  Recommend further evaluation with MRI guided core needle biopsy.       12/2/2021: Genetic testing  INVITAE genetic test 47 genes negative    December 15, 2021: Bilateral breast MR guided biopsy  1.  Right breast 6 o'clock position, stereotactic biopsy for non-mass enhancement  A.  Benign breast parenchyma with fibroadenomatoid hyperplasia, columnar cell hyperplasia and stromal fibrosis    B.  No atypical hyperplasia, in situ or invasive carcinoma identified    2.  Left breast 3 o'clock position stereotactic biopsy for a mass  A.  Still sclerotic intraductal papilloma  B.  No atypical hyperplasia, in situ or invasive carcinoma identified    January 18, 2022:  Left breast needle localized excisional biopsy and right needle localized partial mastectomy with oncoplastic closure    Final Diagnosis   1.  Breast, Left, Lumpectomy (3 grams):                A.  Focal sclerosis suggesting possible minimal residual sclerotic papilloma identified adjacent to         barbell shaped clip and biopsy site change.   B.  No evidence of atypia, in situ nor infiltrating carcinoma.       2.  Breast, Right, Additional Superior, Lateral and Posterior Margins, Inked and Oriented Excision:                A.  Benign breast parenchyma.     3.  Breast, Right Additional Inferior and Medial Margins, Inked and oriented Excision:                A.  Benign breast parenchyma.      4.  Breast, Right Lumpectomy (3 grams):                A.  Anderson tie clip with biopsy site change identified.                 B.  No evidence of atypia, in situ nor infiltrating carcinoma.       5.  Breast, Left, Reduction Mammoplasty (56 grams):                 A.  Benign skin, subcutaneous tissue and breast parenchyma with duct ectasia, apocrine metaplasia,         columnar cell change, sclerosing adenosis and mild ductal hyperplasia.    B.  No evidence atypia, in situ nor infiltrating carcinoma.      6.  Breast, Right Reduction Mammoplasty, (38 grams).                   A.  Benign skin, subcutaneous tissue and breast parenchyma and focal mild duct ectasia, apocrine         metaplasia, and columnar cell change.      mec/brb     Patient is here for further evaluation and treatment.  Patient has appointment with Dr. Beatris Ugarte on February 9, 2022    Patient seen by Dr. Beatris Ugarte and she elected not to proceed with radiation    February 12, 2022: Started tamoxifen      Past Medical History:   Diagnosis Date   • Anxiety    • Breast cancer (HCC)    • Depression    • Ductal carcinoma in situ (DCIS) of right breast 2021   • Migraine    • Seasonal allergies         Past Surgical History:   Procedure Laterality Date   •  ABDOMINOPLASTY  2016   • BREAST LUMPECTOMY Right 2022    Procedure: Right needle-localized partial mastectomy,Left breast needle-localized excisional biopsy;  Surgeon: Enedina Stovall MD;  Location: [facility];  Service: General;  Laterality: Right;   • BREAST SURGERY Bilateral 2022    Procedure: RIGHT BREAST ONCOPLASTIC RECONSTRUCTION WITH SYMMETRY PROCEDURE;  Surgeon: Nestor Zapata MD;  Location: [facility];  Service: Plastics;  Laterality: Bilateral;   • COLONOSCOPY     • LAPAROSCOPIC CHOLECYSTECTOMY     • OTHER SURGICAL HISTORY      Arm Surgery (Skin Removal)        Current Outpatient Medications on File Prior to Visit   Medication Sig Dispense Refill   • acyclovir (ZOVIRAX) 400 MG tablet TAKE 1 TABLET BY MOUTH TWICE DAILY FOR 5 DAYS AS NEEDED     • cetirizine (zyrTEC) 10 MG tablet Daily As Needed for Allergies.     • escitalopram (LEXAPRO) 20 MG tablet Take 20 mg by mouth Daily.     • hydrOXYzine (ATARAX) 10 MG tablet TK 1/2 TO 1 T PO Q 6 H PRA     • tamoxifen (NOLVADEX) 20 MG chemo tablet Take 1 tablet by mouth Daily.     • triamcinolone (KENALOG) 0.1 % paste As Needed.     • Vitamin D, Cholecalciferol, (CHOLECALCIFEROL) 10 MCG (400 UNIT) tablet Take 400 Units by mouth Daily.     • ZOLMitriptan (ZOMIG) 5 MG tablet Take 5 mg by mouth As Needed for Migraine.       No current facility-administered medications on file prior to visit.        ALLERGIES:    Allergies   Allergen Reactions   • Penicillins Nausea Only        Social History     Socioeconomic History   • Marital status:      Spouse name: Addison   • Number of children: 2   Tobacco Use   • Smoking status: Former Smoker     Packs/day: 1.00     Years: 15.00     Pack years: 15.00     Types: Cigarettes     Quit date:      Years since quittin.3   • Smokeless tobacco: Never Used   Vaping Use   • Vaping Use: Never used   Substance and Sexual Activity   • Alcohol use: No   • Drug use: No   • Sexual activity: Defer     "    Family History   Problem Relation Age of Onset   • Ovarian cancer Maternal Grandmother 64   • Pancreatic cancer Maternal Great-Grandmother    • Breast cancer Mother    • Malig Hyperthermia Neg Hx         Review of Systems   Constitutional: Negative for appetite change, chills, diaphoresis, fatigue, fever and unexpected weight change.   HENT: Negative for hearing loss, sore throat and trouble swallowing.    Respiratory: Negative for cough, chest tightness, shortness of breath and wheezing.    Cardiovascular: Negative for chest pain, palpitations and leg swelling.   Gastrointestinal: Negative for abdominal distention, abdominal pain, constipation, diarrhea, nausea and vomiting.   Genitourinary: Positive for menstrual problem (Irregular). Negative for dysuria, frequency, hematuria and urgency.   Musculoskeletal: Negative for joint swelling.        No muscle weakness.   Skin: Negative for rash and wound.   Neurological: Negative for seizures, syncope, speech difficulty, weakness, numbness and headaches.   Hematological: Negative for adenopathy. Does not bruise/bleed easily.   Psychiatric/Behavioral: Negative for behavioral problems, confusion and suicidal ideas.   All other systems reviewed and are negative.       Objective     Vitals:    04/25/22 1051   BP: 112/79   Pulse: 82   Resp: 16   Temp: 97.5 °F (36.4 °C)   TempSrc: Temporal   SpO2: 100%   Weight: 65.9 kg (145 lb 3.2 oz)   Height: 164.4 cm (64.72\")   PainSc: 0-No pain     Current Status 4/25/2022   ECOG score 0       Physical Exam        CONSTITUTIONAL:  Vital signs reviewed.  Alert and oriented x3  No distress, looks comfortable.  RESPIRATORY:  Normal respiratory effort.  No rales  or wheezing, clear.   CARDIOVASCULAR:  Regular rate and rhythm, no murmur  No significant lower extremity edema.  Abdomen: Soft nontender positive bowel sounds no hepatosplenomegaly  SKIN: No wounds.  No rashes.  MUSCULOSKELETAL/EXTREMITIES: No clubbing or cyanosis.  No apparent " unilateral weakness.  NEURO: CN 2-12 appear intact. No focal neurological deficits noted.  PSYCHIATRIC:  Normal judgment and insight.  Normal mood and affect.      RECENT LABS:  Hematology WBC   Date Value Ref Range Status   04/25/2022 6.90 3.40 - 10.80 10*3/mm3 Final   10/30/2019 5.24 4.5 - 11.0 10*3/uL Final     RBC   Date Value Ref Range Status   04/25/2022 4.21 3.77 - 5.28 10*6/mm3 Final   10/30/2019 4.44 4.0 - 5.2 10*6/uL Final     Hemoglobin   Date Value Ref Range Status   04/25/2022 13.7 12.0 - 15.9 g/dL Final   10/30/2019 14.2 12.0 - 16.0 g/dL Final     Hematocrit   Date Value Ref Range Status   04/25/2022 38.8 34.0 - 46.6 % Final   10/30/2019 43.7 36.0 - 46.0 % Final     Platelets   Date Value Ref Range Status   04/25/2022 231 140 - 450 10*3/mm3 Final   10/30/2019 222 140 - 440 10*3/uL Final          Assessment/Plan     *Tis N0, stage 0 right breast DCIS, 3 mm, nuclear grade 2, no invasive carcinoma lymphovascular space invasion seen, %, NV 99%, Ki-67 5% s/p right partial mastectomy and left breast excisional biopsy with oncoplastic closure on January 18, 2022.  Final pathology on the left was benign and there was no residual disease found in the right breast at surgery.  · Patient is eligible for chemoprophylaxis with tamoxifen as she is premenopausal  · Discussed the side effects of endocrine therapy  · Tamoxifen causes hot flashes, rare chance for uterine cancer, blood clots, DVT, PE, hair thinning, rare chance for thrombocytopenia, cataracts.  Aromatase inhibitors can cause arthralgias, hot flashes, decrease in bone density, rare chance for diarrhea, also discussed about hot flashes with it.  Evista is approved for osteopenia and can also be used for prophylaxis with fewer side effects except hot flashes and rare chance for cataracts but it can improve bone density.   · As patient is premenopausal will go ahead and start her on tamoxifen  · Patient has follow-up with radiation oncology Dr. Spear  Shanel  · Patient discussed with radiation oncology Dr. Beatris Kimbrough and decided not to take radiation  · February 2022: Started tamoxifen and tolerating well    Plan  · Patient discussed with radiation oncology Dr. Beatris Ugarte and looking at the risk of recurrence being low she prefer not to take radiation  · She has started tamoxifen since February 2022  · Tolerating tamoxifen very well  · Patient has follow-up with her gynecologist as she did have some irregular menstrual periods, it is likely because she is going into menopause  · Follow-up in 2 months, video visit    I spent 30  total minutes, face-to-face, caring for Ara today.  Greater than 50% of this time involved counseling and/or coordination of care as documented within this note.    MD Dr. Enedina Jorge Dr.

## 2022-05-11 ENCOUNTER — OFFICE VISIT (OUTPATIENT)
Dept: SURGERY | Facility: CLINIC | Age: 47
End: 2022-05-11

## 2022-05-11 ENCOUNTER — DOCUMENTATION (OUTPATIENT)
Dept: SURGERY | Facility: CLINIC | Age: 47
End: 2022-05-11

## 2022-05-11 VITALS
HEART RATE: 82 BPM | DIASTOLIC BLOOD PRESSURE: 69 MMHG | SYSTOLIC BLOOD PRESSURE: 99 MMHG | BODY MASS INDEX: 24.16 KG/M2 | WEIGHT: 145 LBS | HEIGHT: 65 IN

## 2022-05-11 DIAGNOSIS — D24.2 INTRADUCTAL PAPILLOMA OF LEFT BREAST: ICD-10-CM

## 2022-05-11 DIAGNOSIS — D05.11 DUCTAL CARCINOMA IN SITU (DCIS) OF RIGHT BREAST: Primary | ICD-10-CM

## 2022-05-11 PROCEDURE — 99214 OFFICE O/P EST MOD 30 MIN: CPT | Performed by: NURSE PRACTITIONER

## 2022-05-11 NOTE — PROGRESS NOTES
Called [facility] to schedule [facility]. Their system was down. I faxed the order and they told me they would call the patient to schedule.

## 2022-05-11 NOTE — PROGRESS NOTES
[facility]     Referring Provider: No ref. provider found     Chief complaint:  Breast cancer follow up visit      HPI:   12/1/21:  Ms. 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 This was initially detected as an imaging abnormality on routine screening. Her work-up is detailed in the oncologic history below. Prior to the biopsy, she denies any breast lumps, pain, skin changes, or nipple discharge. She denies any prior history of abnormal mammograms or breast biopsies. She denies any family history of breast cancer. She has a family history of ovarian cancer in her maternal grandmother and pancreatic cancer in her maternal great grandmother.     12/17/21:  She underwent genetic testing which was negative for mutation. She then underwent bilateral MR-guided biopsies. This was negative on the right and on the left showed an intraductal papilloma (see report details below).     2/3/22:  She underwent right partial mastectomy, and left breast excisional biopsy with oncoplastic closure on 1/18/22. See surgery & pathology details below in oncologic history. She has been recovering well and has no complaints. She was joined today in clinic by her . She gave consent for him to be present during her examination and participate in the discussion.     5/11/22, Interval History:  She returns today for follow up with some changes in her bilateral breasts since surgery that seem to be scarring.  She was seen by Dr Zapata in 4/22 at which time he felt they were c/w surgical changes, she will follow up with him in 7/22.     Given low risk of recurrence, she prefers not to take radiation.  She is tolerating tamoxifen well.    She was seen by Dr Goldman in 2/22: , I think her risk of local recurrence is extremely low based on the small size of dcis, no residual disease on lumpectomy specimen therefore widely  negative margins and strong estrogen receptor positivity.  According to the [facility] [place] [facility] nomogram for DCIS risk of recurrence, her risk of local recurrence at 10years is approximately 10%.  I explained that radiation would reduce the risk in half to approximately 5% at 10 years but no change in overall survival    Her last clinic visit with Dr Ventura was in 4/22:  She has started tamoxifen since February 2022, Tolerating tamoxifen very well        Oncology/Hematology History Overview Note   09/09/2020, Screening MMG with Jose ([facility]):  Heterogeneously dense. No dominant masses, suspicious microcalcifications or architectural distortions are identified in either breast. Stable exam without mammographic signs of malignancy.  BI-RADS 1: Negative.     Ductal carcinoma in situ (DCIS) of right breast   9/12/2021 Initial Diagnosis    Ductal carcinoma in situ (DCIS) of right breast     9/13/2021 Imaging    Screening MMG with Jose ([facility]):  Heterogeneously dense. There is a group of calcifications within the upper outer quadrant of the right breast. No additional suspicious calcifications, architectural distortions or mass lesions identified.  BI-RADS 0: Incomplete.     9/24/2021 Imaging    Right Diagnostic MMG with Jose ([facility]):  As noted on the screening mammogram, there are calcifications at the upper aspect of the right breast. The calcifications demonstrate a somewhat amorphous appearance in the CC projection. On the 90 degree view, the majority of the calcifications demonstrate evidence for layering indicating milk of calcium as a benign entity. However, there is a small cluster of calcifications noted at the posterior upper outer aspect of the right breast. This cluster of calcifications does not demonstrate evidence for layering. The findings indicate a suspicious cluster of calcifications. This cluster of calcifications is marked on the images. No  suspicious masses or architectural distortions are observed.  BI-RADS 4: Suspicious.     10/28/2021 Biopsy    Right Breast, Stereotactic Biopsy ([facility]):    Right breast, stereotactic biopsy:  1. Ductal carcinoma in situ, cribriform type.  2. Nuclear grade 2 of 3.  3. Maximal dimension of DCIS is 0.3 cm.  4. Central comedo necrosis and calcifications are present.  5. No invasive carcinoma or lymphovascular invasion is identified.  6. The non-neoplastic breast shows fibrous mastopathy, apocrine hyperplasia and mild duct ectasia.    Comment - Two portions of tissue show ductal carcinoma in situ, cribriform type with central necrosis and calcifications.    ER+ (100%, strong)  FL+ (99%, strong)      [facility] PATHOLOGY REVIEW    1. Right Breast, Stereotactic Biopsy:               A. Ductal carcinoma in situ (DCIS):                            1. Low to intermediate grade cribriform DCIS with focal comedo type necrosis.                            2. DCIS focally present measuring up to at least 3 mm in greatest dimension.                            3. Microcalcification present within DCIS.                            4. Biomarkers ([facility]):                                         Estrogen Receptor: Positive (100%, Douglas 8/8)                                         Progesterone Receptor: Positive (98%, Douglas 7/8)                                         Ki67 ([facility] performed in House) =  5%               B. No invasive carcinoma identified.     Comment: We essentially concur with the pathologist's original submitted diagnosis of intermediate grade cribriform ductal carcinoma in situ with focal comedo type necrosis and microcalcification.  Immunostain for CK5/6 performed at the submitting institution is reviewed demonstrating an intact myoepithelial layer in areas of concern.  The submitted paraffin block will be used for one H&E recut plus immunostain for Ki-67 reported above.  All slides  prepared at the submitting institution in addition to their paraffin block will be returned.  Slides performed in house will be retained on file for comparison with the anticipated resection specimen.  This case was shared in internal consultation with Dr. Rogel, who concurred with the above diagnosis.     11/29/2021 Imaging    [facility]ilateral Breast [facility]  RIGHT BREAST:    At 11:00, 5.2 cm posterior to the nipple, there is a 0.7 cm AP dimension, 2.2 cm transverse dimension, 1.7 cm craniocaudal dimension peripherally enhancing biopsy cavity/tract with a central focus of susceptibility from a biopsy clip marking the site of biopsy-proven malignancy. No suspicious mass or nonmass enhancement is identified at the biopsy site.  At 6:00 in the posterior right breast, 6.2 cm posterior to the nipple, there is a 0.9 cm AP dimension, 0.8 cm transverse dimension, 0.5 cm craniocaudal dimension focal area of clumped nonmass enhancement, which  is suspicious.  No suspicious enhancement is identified in the right nipple or chest wall.  The visualized axilla is within normal limits.    LEFT BREAST:    At 3:00 in the anterior left breast, 3.5 cm posterior to the nipple, there is a 1.0 cm AP dimension, 1.1 cm transverse dimension, 0.7 cm craniocaudal dimension enhancing mass with irregular margins, which is suspicious.  No suspicious enhancement is identified in the left nipple or chest wall.  The visualized axilla is within normal limits.   EXTRAMAMMARY FINDINGS:   There are no abnormally enlarged internal mammary chain lymph nodes on either side.  BI-RADS 4: Suspicious.     12/2/2021 Genetic Testing    [employer] Common Hereditary Cancers Panel (47 genes):    Negative     12/15/2021 Biopsy    Bilateral Breast, MR-Guided Biopsy ([facility]):    1.  Right Breast, 6 o'clock, Stereotatic Biopsies for Non-Mass Enhancement:                A.  Benign breast parenchyma with fibroadenomatoid hyperplasia, columnar cell hyperplasia and          stromal fibrosis.   B.  No atypical hyperplasia, in situ nor invasive carcinoma identified.      2.  Left Breast, 3 o'clock, Stereotatic Biopsies for a Mass:                A.  Sclerotic intraductal papilloma.               B.  No atypical hyperplasia, in situ nor invasive carcinoma identified.       1/18/2022 Surgery    Left breast needle-localized excisional biopsy and right needle-localized partial mastectomy with oncoplastic closure    1.  Breast, Left, Lumpectomy (3 grams):                A.  Focal sclerosis suggesting possible minimal residual sclerotic papilloma identified adjacent to         barbell shaped clip and biopsy site change.   B.  No evidence of atypia, in situ nor infiltrating carcinoma.       2.  Breast, Right, Additional Superior, Lateral and Posterior Margins, Inked and Oriented Excision:                A.  Benign breast parenchyma.     3.  Breast, Right Additional Inferior and Medial Margins, Inked and oriented Excision:                A.  Benign breast parenchyma.      4.  Breast, Right Lumpectomy (3 grams):                A.  Frohna tie clip with biopsy site change identified.                 B.  No evidence of atypia, in situ nor infiltrating carcinoma.       5.  Breast, Left, Reduction Mammoplasty (56 grams):                 A.  Benign skin, subcutaneous tissue and breast parenchyma with duct ectasia, apocrine metaplasia, columnar cell change, sclerosing adenosis and mild ductal hyperplasia.    B.  No evidence atypia, in situ nor infiltrating carcinoma.      6.  Breast, Right Reduction Mammoplasty, (38 grams).                   A.  Benign skin, subcutaneous tissue and breast parenchyma and focal mild duct ectasia, apocrine         metaplasia, and columnar cell change.      2/8/2022 Cancer Staged    Staging form: Breast, AJCC 8th Edition  - Clinical stage from 2/8/2022: Stage 0 (cTis (DCIS), cN0, cM0, ER+, NJ+) - Signed by Sonal Ventura MD on 2/12/2022         Review of Systems:  See  "interval history.      Medications:    Current Outpatient Medications:   •  acyclovir (ZOVIRAX) 400 MG tablet, TAKE 1 TABLET BY MOUTH TWICE DAILY FOR 5 DAYS AS NEEDED, Disp: , Rfl:   •  cetirizine (zyrTEC) 10 MG tablet, Daily As Needed for Allergies., Disp: , Rfl:   •  escitalopram (LEXAPRO) 20 MG tablet, Take 20 mg by mouth Daily., Disp: , Rfl:   •  hydrOXYzine (ATARAX) 10 MG tablet, TK 1/2 TO 1 T PO Q 6 H PRA, Disp: , Rfl:   •  tamoxifen (NOLVADEX) 20 MG chemo tablet, Take 1 tablet by mouth Daily., Disp: , Rfl:   •  triamcinolone (KENALOG) 0.1 % paste, As Needed., Disp: , Rfl:   •  Vitamin D, Cholecalciferol, (CHOLECALCIFEROL) 10 MCG (400 UNIT) tablet, Take 400 Units by mouth Daily., Disp: , Rfl:   •  ZOLMitriptan (ZOMIG) 5 MG tablet, Take 5 mg by mouth As Needed for Migraine., Disp: , Rfl:       Allergies   Allergen Reactions   • Penicillins Nausea Only       Family History   Problem Relation Age of Onset   • Ovarian cancer Maternal Grandmother 64   • Pancreatic cancer Maternal Great-Grandmother    • Breast cancer Mother    • Malig Hyperthermia Neg Hx        PHYSICAL EXAMINATION:   Vitals:    05/11/22 1333   BP: 99/69   Pulse: 82      BP 99/69 (BP Location: Right arm)   Pulse 82   Ht 164.4 cm (64.72\")   Wt 65.8 kg (145 lb)   BMI 24.34 kg/m²     I reviewed physical exam, no changes noted    ECOG 0 - Asymptomatic  General: NAD, well appearing  Psych: a&o x3, normal mood and affect  Eyes: EOMI, no scleral icterus  ENMT: neck supple without masses or thyromegaly, mucous membranes moist  MSK: normal gait, normal ROM in bilateral shoulders  Lymph nodes: no cervical, supraclavicular or axillary lymphadenopathy  Breast: symmetric, moderate size, grade 2 ptosis  Right: Sp mastopexy with well-healed incisions. Flaps and NAC healthy.  No visible abnormalities on inspection while seated, with arms raised or hands on hips. No masses, skin changes, or nipple abnormalities.  Cording in lower aspect 0500, 6 CFN c/w surgical " changes  Left: Sp mastopexy with well-healed incisions. There is an area over the lateral NAC with thickening and lighter pigment, but this appears viable.  No visible abnormalities on inspection while seated, with arms raised or hands on hips. No masses, skin changes, or nipple abnormalities.    Assessment:   1)  47 y.o. 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 She underwent right partial mastectomy and left breast excisional biopsy with oncoplastic closure on 1/18/22 with benign final pathology on the left and no residual disease found in the right breast (3 mm on core), pTis.    Discussion:  We discussed her follow up which includes clinical breast exam every 6 months for 2 years (10/2023), with mammogram annually then  mammogram and exam annually until 5 years from diagnosis (10/26).    I advised her to continue with breast massage, demonstrated technique to be done daily.    Plan:  - continue follow up with Dr. Ventura   -Continue follow-up with Dr. Zapata.  - continue massage to surgical incisions  -bilateral screening mammogram with tomosynthesis in 6 months at [facility] followed by exam  -She was instructed to call sooner with any questions, concerns or changes on BSE.    RAOUL Bustamante     I spent 30 minutes caring for Ms Diamond on this date of service. This time includes time spent by me in the following activities: preparing for the visit, reviewing tests, obtaining and/or reviewing a separately obtained history, performing a medically appropriate examination and/or evaluation , counseling and educating the patient/family/caregiver, ordering medications, tests, or procedures and documenting information in the medical record.        CC:  RAOUL Torres PA

## 2022-05-17 ENCOUNTER — TELEPHONE (OUTPATIENT)
Dept: OTHER | Facility: HOSPITAL | Age: 47
End: 2022-05-17

## 2022-05-17 NOTE — TELEPHONE ENCOUNTER
[facility] MULTIDISCIPLIN[facility] CLINIC  SURVIVORSHIP SERVICES CARE COORDINATION NOTE  Survivorship Treatment Summary Referral Scheduling  PHONE      Call placed to patient RE: open referral to survivorship treatment summary visit.    Left voice mail for patient    Introduced myself and reviewed purpose and goals of survivorship treatment summary visit as well as what to expect..    Patient mailed name and contact information for Beatris Perez DNP, APRN.    Patient encouraged to call the office at any point for additional information, resources or support.

## 2022-06-13 RX ORDER — TAMOXIFEN CITRATE 20 MG/1
TABLET ORAL DAILY
Qty: 90 TABLET | Refills: 0 | Status: SHIPPED | OUTPATIENT
Start: 2022-06-13 | End: 2022-08-29 | Stop reason: SDUPTHER

## 2022-06-15 ENCOUNTER — DOCUMENTATION (OUTPATIENT)
Dept: SURGERY | Facility: CLINIC | Age: 47
End: 2022-06-15

## 2022-07-08 ENCOUNTER — TELEPHONE (OUTPATIENT)
Dept: ORTHOPEDIC SURGERY | Facility: CLINIC | Age: 47
End: 2022-07-08

## 2022-07-08 NOTE — TELEPHONE ENCOUNTER
CALLED PATIENT AT HOME (LVM) TO CALL [phone] TO SCHEDULE. CALLED WORK # ON FILE (NON WORKING #).

## 2022-07-08 NOTE — TELEPHONE ENCOUNTER
Caller: Ara Diamond    Relationship to patient: Self    Best call back number:     Additional notes:PATIENT IS WANTING AN APPT FOR A CORTISONE INJECTION INTO HER LEFT ARM. ADVISING ANY DAY EXCEPT THE 18TH OR 19TH OF July. PLEASE CALL, THANK YOU!

## 2022-07-25 ENCOUNTER — OFFICE VISIT (OUTPATIENT)
Dept: ORTHOPEDIC SURGERY | Facility: CLINIC | Age: 47
End: 2022-07-25

## 2022-07-25 VITALS
WEIGHT: 145 LBS | HEIGHT: 65 IN | DIASTOLIC BLOOD PRESSURE: 74 MMHG | SYSTOLIC BLOOD PRESSURE: 106 MMHG | BODY MASS INDEX: 24.16 KG/M2 | HEART RATE: 87 BPM

## 2022-07-25 DIAGNOSIS — M25.512 LEFT SHOULDER PAIN, UNSPECIFIED CHRONICITY: Primary | ICD-10-CM

## 2022-07-25 PROCEDURE — 20610 DRAIN/INJ JOINT/BURSA W/O US: CPT | Performed by: ORTHOPAEDIC SURGERY

## 2022-07-25 RX ORDER — IBUPROFEN 800 MG/1
800 TABLET ORAL EVERY 6 HOURS PRN
COMMUNITY
Start: 2022-07-02

## 2022-07-25 RX ORDER — CHLORHEXIDINE GLUCONATE 0.12 MG/ML
RINSE ORAL
COMMUNITY
Start: 2022-06-27

## 2022-07-25 RX ORDER — TRIAMCINOLONE ACETONIDE 40 MG/ML
40 INJECTION, SUSPENSION INTRA-ARTICULAR; INTRAMUSCULAR ONCE
Status: COMPLETED | OUTPATIENT
Start: 2022-07-25 | End: 2022-07-26

## 2022-07-26 RX ADMIN — TRIAMCINOLONE ACETONIDE 40 MG: 40 INJECTION, SUSPENSION INTRA-ARTICULAR; INTRAMUSCULAR at 07:37

## 2022-07-27 NOTE — PROGRESS NOTES
"     Patient ID: Ara Diamond is a 47 y.o. female.    Left shoulder pain  Did great with cortisone injection last year pain returned the last couple weeks    Objective:    /74   Pulse 87   Ht 164.4 cm (64.72\")   Wt 65.8 kg (145 lb)   BMI 24.34 kg/m²     Physical Examination:     Left shoulder demonstrates intact skin with mild swelling.  No redness.  Passive elevation 180 abduction 150 external rotation 40 internal rotation L5 with mild pain but no weakness on Speed Delong supraspinatus testing with positive impingement sign    Imaging:      Assessment:  Left shoulder bursitis    Plan:  I recommend injection after todays evaluation.  Risks and benefits were discussed. Under sterile technique and after timeout and verbal consent I injected 40 mg of Kenalog and 1 mL of 1% Lidocaine plain into the subacromial space. It was well tolerated.  "

## 2022-07-29 PROCEDURE — U0004 COV-19 TEST NON-CDC HGH THRU: HCPCS | Performed by: FAMILY MEDICINE

## 2022-08-25 ENCOUNTER — TELEPHONE (OUTPATIENT)
Dept: ONCOLOGY | Facility: CLINIC | Age: 47
End: 2022-08-25

## 2022-08-25 NOTE — TELEPHONE ENCOUNTER
Caller: Ara Diamond    Relationship: Self    Best call back number: [phone]      What was the call regarding: PATIENT WILL BE OUT OF HER TAMOXIFEN IN 2 WEEKS AND WANTED TO MAKE SURE DR MORENO WOULD REFILL OR DOES SHE NEED ANOTHER APPOINTMENT    Do you require a callback: YES

## 2022-08-25 NOTE — TELEPHONE ENCOUNTER
Attempted to call patient back to let her know she will need to follow up with Dr. Ventura in order for her to keep prescribing Tamoxifen.  Patient did not answer and no voice mail set up.

## 2022-08-29 RX ORDER — TAMOXIFEN CITRATE 20 MG/1
20 TABLET ORAL DAILY
Qty: 30 TABLET | Refills: 0 | Status: SHIPPED | OUTPATIENT
Start: 2022-08-29 | End: 2022-09-27

## 2022-08-29 RX ORDER — TAMOXIFEN CITRATE 20 MG/1
20 TABLET ORAL DAILY
Qty: 90 TABLET | Refills: 0 | Status: CANCELLED | OUTPATIENT
Start: 2022-08-29

## 2022-08-29 NOTE — TELEPHONE ENCOUNTER
Call to Ms. Diamond, and let her know that we will send a 30 day refill on her Tamoxifen, but she does need to follow up with Dr. Ventura, as her telephone visit had been cancelled in June.  Patient agreeable to get back on the schedule.  Let her know that scheduling will call her to set up follow up visit. Patient verbalized understanding.

## 2022-09-20 DIAGNOSIS — D05.11 DUCTAL CARCINOMA IN SITU (DCIS) OF RIGHT BREAST: Primary | ICD-10-CM

## 2022-09-27 RX ORDER — TAMOXIFEN CITRATE 20 MG/1
20 TABLET ORAL DAILY
Qty: 90 TABLET | Refills: 1 | Status: SHIPPED | OUTPATIENT
Start: 2022-09-27 | End: 2023-03-23

## 2022-09-28 ENCOUNTER — LAB (OUTPATIENT)
Dept: OTHER | Facility: HOSPITAL | Age: 47
End: 2022-09-28

## 2022-09-28 ENCOUNTER — OFFICE VISIT (OUTPATIENT)
Dept: ONCOLOGY | Facility: CLINIC | Age: 47
End: 2022-09-28

## 2022-09-28 VITALS
RESPIRATION RATE: 16 BRPM | WEIGHT: 146.3 LBS | BODY MASS INDEX: 24.98 KG/M2 | HEART RATE: 72 BPM | OXYGEN SATURATION: 98 % | SYSTOLIC BLOOD PRESSURE: 108 MMHG | HEIGHT: 64 IN | DIASTOLIC BLOOD PRESSURE: 74 MMHG | TEMPERATURE: 98.2 F

## 2022-09-28 DIAGNOSIS — N92.6 IRREGULAR PERIODS/MENSTRUAL CYCLES: ICD-10-CM

## 2022-09-28 DIAGNOSIS — D05.10 DUCTAL CARCINOMA IN SITU (DCIS) OF BREAST, UNSPECIFIED LATERALITY: Primary | ICD-10-CM

## 2022-09-28 DIAGNOSIS — D05.11 DUCTAL CARCINOMA IN SITU (DCIS) OF RIGHT BREAST: ICD-10-CM

## 2022-09-28 LAB
ALBUMIN SERPL-MCNC: 4.1 G/DL (ref 3.5–5.2)
ALBUMIN/GLOB SERPL: 1.5 G/DL
ALP SERPL-CCNC: 63 U/L (ref 39–117)
ALT SERPL W P-5'-P-CCNC: 11 U/L (ref 1–33)
ANION GAP SERPL CALCULATED.3IONS-SCNC: 7.5 MMOL/L (ref 5–15)
AST SERPL-CCNC: 19 U/L (ref 1–32)
BASOPHILS # BLD AUTO: 0.05 10*3/MM3 (ref 0–0.2)
BASOPHILS NFR BLD AUTO: 0.8 % (ref 0–1.5)
BILIRUB SERPL-MCNC: 0.3 MG/DL (ref 0–1.2)
BUN SERPL-MCNC: 10 MG/DL (ref 6–20)
BUN/CREAT SERPL: 13.7 (ref 7–25)
CALCIUM SPEC-SCNC: 8.9 MG/DL (ref 8.6–10.5)
CHLORIDE SERPL-SCNC: 109 MMOL/L (ref 98–107)
CO2 SERPL-SCNC: 25.5 MMOL/L (ref 22–29)
CREAT SERPL-MCNC: 0.73 MG/DL (ref 0.57–1)
DEPRECATED RDW RBC AUTO: 41.7 FL (ref 37–54)
EGFRCR SERPLBLD CKD-EPI 2021: 101 ML/MIN/1.73
EOSINOPHIL # BLD AUTO: 0.13 10*3/MM3 (ref 0–0.4)
EOSINOPHIL NFR BLD AUTO: 2.1 % (ref 0.3–6.2)
ERYTHROCYTE [DISTWIDTH] IN BLOOD BY AUTOMATED COUNT: 12.3 % (ref 12.3–15.4)
GLOBULIN UR ELPH-MCNC: 2.8 GM/DL
GLUCOSE SERPL-MCNC: 89 MG/DL (ref 65–99)
HCT VFR BLD AUTO: 39.5 % (ref 34–46.6)
HGB BLD-MCNC: 13.8 G/DL (ref 12–15.9)
IMM GRANULOCYTES # BLD AUTO: 0.03 10*3/MM3 (ref 0–0.05)
IMM GRANULOCYTES NFR BLD AUTO: 0.5 % (ref 0–0.5)
LYMPHOCYTES # BLD AUTO: 2.26 10*3/MM3 (ref 0.7–3.1)
LYMPHOCYTES NFR BLD AUTO: 36.3 % (ref 19.6–45.3)
MCH RBC QN AUTO: 32.2 PG (ref 26.6–33)
MCHC RBC AUTO-ENTMCNC: 34.9 G/DL (ref 31.5–35.7)
MCV RBC AUTO: 92.3 FL (ref 79–97)
MONOCYTES # BLD AUTO: 0.34 10*3/MM3 (ref 0.1–0.9)
MONOCYTES NFR BLD AUTO: 5.5 % (ref 5–12)
NEUTROPHILS NFR BLD AUTO: 3.42 10*3/MM3 (ref 1.7–7)
NEUTROPHILS NFR BLD AUTO: 54.8 % (ref 42.7–76)
NRBC BLD AUTO-RTO: 0 /100 WBC (ref 0–0.2)
PLATELET # BLD AUTO: 226 10*3/MM3 (ref 140–450)
PMV BLD AUTO: 9.5 FL (ref 6–12)
POTASSIUM SERPL-SCNC: 4 MMOL/L (ref 3.5–5.2)
PROT SERPL-MCNC: 6.9 G/DL (ref 6–8.5)
RBC # BLD AUTO: 4.28 10*6/MM3 (ref 3.77–5.28)
SODIUM SERPL-SCNC: 142 MMOL/L (ref 136–145)
WBC NRBC COR # BLD: 6.23 10*3/MM3 (ref 3.4–10.8)

## 2022-09-28 PROCEDURE — 85025 COMPLETE CBC W/AUTO DIFF WBC: CPT | Performed by: INTERNAL MEDICINE

## 2022-09-28 PROCEDURE — 99214 OFFICE O/P EST MOD 30 MIN: CPT | Performed by: INTERNAL MEDICINE

## 2022-09-28 PROCEDURE — 36415 COLL VENOUS BLD VENIPUNCTURE: CPT

## 2022-09-28 PROCEDURE — 80053 COMPREHEN METABOLIC PANEL: CPT | Performed by: INTERNAL MEDICINE

## 2022-09-28 NOTE — PROGRESS NOTES
Subjective     REASON FOR follow-up:     1. Tis N0, stage 0 right breast DCIS, 3 mm, nuclear grade 2, no invasive carcinoma lymphovascular space invasion seen, %, [place] 99%, Ki-67 5% s/p right partial mastectomy and left breast excisional biopsy with oncoplastic closure on January 18, 2022.  Final pathology on the left was benign and there was no residual disease found in the right breast at surgery.    · Started tamoxifen February 12, 2022      History of Present Illness     Patient is a 47-year-old female with a history of DCIS currently on tamoxifen and tolerating well.  She denies any complaints.  She has not lost weight.  She has got a good appetite.  She denies any hot flashes.  She did go to Dr. Trinh, [facility] in [place] and had ultrasound which was negative.  She has had irregular menstrual periods likely because she is getting into menopause.  Her gynecologist was not concerned.  We will try to get records from her gynecologist in [place].      Oncologic history:  Patient is a 47-year-old female who had been recently diagnosed with right breast ductal carcinoma in situ.  This was detected on a routine screening mammogram.  There is no family history of breast cancer but there is family history of ovarian cancer in maternal grandmother and pancreatic cancer in her maternal great-grandmother.  Patient is referred here for further options of treatment.    Details are as follows    September 13, 2021: Screening mammogram    There is a group of calcifications within the upper outer quadrant of the right breast, no other calcifications are clear architectural distortion or mass is identified    September 24, 2021: Right breast diagnostic mammogram and ultrasound    Calcifications in the upper aspect of the right breast are present.  The calcifications demonstrate amorphous appearance on the CC projection.  On the 90 degree view the majority of the calcifications show evidence of layering  indicating milk or calcium as a benign entity.  However there is a small cluster of calcification noted in the posterior upper outer aspect of the right breast.  The cluster of calcifications does not demonstrate evidence for layering.  These indicate a suspicious cluster of calcifications.  No suspicious masses or architectural distortions are seen    October 20, 2021: Right breast stereotactic biopsy done at [facility]  Right breast stereotactic biopsy  1.  Ductal carcinoma in situ, cribriform type  2.  Nuclear grade 2-3  Maximum dimension of DCIS is 0.3 cm  4.  Central comedonecrosis and calcifications are present  5.  No invasive carcinoma or lymphovascular space invasion is identified.  6.  The nonneoplastic breast shows fibrous mastopathy, apocrine hyperplasia and mild duct ectasia.    Comment-2 portions of tissue showed ductal carcinoma in situ, cribriform type with central necrosis and calcifications  ER: 100%  AL: 99%  Ki-67 5%    November 29, 2021: Bilateral breast MRI  IMPRESSION AND RECOMMENDATION:     1.  A 2.2 cm biopsy cavity/tract at 11:00 in the right breast represents  the site of biopsy-proven malignancy. Surgical management is recommended  with preoperative localization targeting the biopsy clip and residual  calcifications noted lateral to the biopsy clip on the post biopsy  mammogram. These have been marked on the post biopsy mammogram.  2.  Suspicious 0.9 cm nonmass enhancement at 6:00 in the right breast.  Recommend further evaluation with MRI guided core needle biopsy.  3.  Suspicious 1.1 cm enhancing mass at 3:00 in the left breast.  Recommend further evaluation with MRI guided core needle biopsy.       12/2/2021: Genetic testing  INVITAE genetic test 47 genes negative    December 15, 2021: Bilateral breast MR guided biopsy  1.  Right breast 6 o'clock position, stereotactic biopsy for non-mass enhancement  A.  Benign breast parenchyma with fibroadenomatoid hyperplasia, columnar  cell hyperplasia and stromal fibrosis    B.  No atypical hyperplasia, in situ or invasive carcinoma identified    2.  Left breast 3 o'clock position stereotactic biopsy for a mass  A.  Still sclerotic intraductal papilloma  B.  No atypical hyperplasia, in situ or invasive carcinoma identified    January 18, 2022: Left breast needle localized excisional biopsy and right needle localized partial mastectomy with oncoplastic closure    Final Diagnosis   1.  Breast, Left, Lumpectomy (3 grams):                A.  Focal sclerosis suggesting possible minimal residual sclerotic papilloma identified adjacent to         barbell shaped clip and biopsy site change.   B.  No evidence of atypia, in situ nor infiltrating carcinoma.       2.  Breast, Right, Additional Superior, Lateral and Posterior Margins, Inked and Oriented Excision:                A.  Benign breast parenchyma.     3.  Breast, Right Additional Inferior and Medial Margins, Inked and oriented Excision:                A.  Benign breast parenchyma.      4.  Breast, Right Lumpectomy (3 grams):                A.  [place] tie clip with biopsy site change identified.                 B.  No evidence of atypia, in situ nor infiltrating carcinoma.       5.  Breast, Left, Reduction Mammoplasty (56 grams):                 A.  Benign skin, subcutaneous tissue and breast parenchyma with duct ectasia, apocrine metaplasia,         columnar cell change, sclerosing adenosis and mild ductal hyperplasia.    B.  No evidence atypia, in situ nor infiltrating carcinoma.      6.  Breast, Right Reduction Mammoplasty, (38 grams).                   A.  Benign skin, subcutaneous tissue and breast parenchyma and focal mild duct ectasia, apocrine         metaplasia, and columnar cell change.      mec/brb     Patient is here for further evaluation and treatment.  Patient has appointment with Dr. Beatris Ugarte on February 9, 2022    Patient seen by Dr. Beatris Ugarte and she elected not to proceed  with radiation    February 12, 2022: Started tamoxifen      Past Medical History:   Diagnosis Date   • Anxiety    • Breast cancer (HCC)    • Depression    • Ductal carcinoma in situ (DCIS) of right breast 2021   • Migraine    • Seasonal allergies         Past Surgical History:   Procedure Laterality Date   • ABDOMINOPLASTY  2016   • BREAST LUMPECTOMY Right 1/18/2022    Procedure: Right needle-localized partial mastectomy,Left breast needle-localized excisional biopsy;  Surgeon: Enedina Stovall MD;  Location: [facility];  Service: General;  Laterality: Right;   • BREAST SURGERY Bilateral 1/18/2022    Procedure: RIGHT BREAST ONCOPLASTIC RECONSTRUCTION WITH SYMMETRY PROCEDURE;  Surgeon: Nestor Zapata MD;  Location: [facility];  Service: Plastics;  Laterality: Bilateral;   • COLONOSCOPY     • LAPAROSCOPIC CHOLECYSTECTOMY     • OTHER SURGICAL HISTORY      Arm Surgery (Skin Removal)        Current Outpatient Medications on File Prior to Visit   Medication Sig Dispense Refill   • acyclovir (ZOVIRAX) 400 MG tablet TAKE 1 TABLET BY MOUTH TWICE DAILY FOR 5 DAYS AS NEEDED     • cetirizine (zyrTEC) 10 MG tablet Daily As Needed for Allergies.     • chlorhexidine (PERIDEX) 0.12 % solution RINSE WITH ONE CAPFUL TWICE DAILY     • escitalopram (LEXAPRO) 20 MG tablet Take 20 mg by mouth Daily.     • hydrOXYzine (ATARAX) 10 MG tablet TK 1/2 TO 1 T PO Q 6 H PRA     • ibuprofen (ADVIL,MOTRIN) 800 MG tablet Take 800 mg by mouth Every 6 (Six) Hours As Needed. for pain     • Omega-3 Fatty Acids (FISH OIL PO) Take  by mouth.     • tamoxifen (NOLVADEX) 20 MG chemo tablet TAKE 1 TABLET BY MOUTH DAILY 90 tablet 1   • tretinoin (RETIN-A) 0.025 % cream APPLY PEA SIZED AMOUNT TOPICALLY TO FACE EVERY NIGHT     • triamcinolone (KENALOG) 0.1 % paste As Needed.     • Vitamin D, Cholecalciferol, (CHOLECALCIFEROL) 10 MCG (400 UNIT) tablet Take 400 Units by mouth Daily.     • ZOLMitriptan (ZOMIG) 5 MG tablet Take 5 mg by mouth As Needed  for Migraine.     • azithromycin (ZITHROMAX) 250 MG tablet Take 2 tablets the first day, then 1 tablet daily for 4 days. 6 tablet 0   • hydrocortisone 2.5 % ointment APPLY TO EYELIDS EVERY NIGHT AT BEDTIME 3 TO 4 TIMES DAILY AS NEEDED FOR FLARES       No current facility-administered medications on file prior to visit.        ALLERGIES:    Allergies   Allergen Reactions   • Penicillins Nausea Only        Social History     Socioeconomic History   • Marital status:      Spouse name: Addison   • Number of children: 2   Tobacco Use   • Smoking status: Former Smoker     Packs/day: 1.00     Years: 15.00     Pack years: 15.00     Types: Cigarettes     Quit date:      Years since quittin.7   • Smokeless tobacco: Never Used   Vaping Use   • Vaping Use: Never used   Substance and Sexual Activity   • Alcohol use: No   • Drug use: No   • Sexual activity: Defer        Family History   Problem Relation Age of Onset   • Ovarian cancer Maternal Grandmother 64   • Pancreatic cancer Maternal Great-Grandmother    • Breast cancer Mother    • Malig Hyperthermia Neg Hx         Review of Systems   Constitutional: Negative for appetite change, chills, diaphoresis, fatigue, fever and unexpected weight change.   HENT: Negative for hearing loss, sore throat and trouble swallowing.    Respiratory: Negative for cough, chest tightness, shortness of breath and wheezing.    Cardiovascular: Negative for chest pain, palpitations and leg swelling.   Gastrointestinal: Negative for abdominal distention, abdominal pain, constipation, diarrhea, nausea and vomiting.   Genitourinary: Positive for menstrual problem (Irregular). Negative for dysuria, frequency, hematuria and urgency.   Musculoskeletal: Negative for joint swelling.        No muscle weakness.   Skin: Negative for rash and wound.   Neurological: Negative for seizures, syncope, speech difficulty, weakness, numbness and headaches.   Hematological: Negative for adenopathy. Does not  "bruise/bleed easily.   Psychiatric/Behavioral: Negative for behavioral problems, confusion and suicidal ideas.   All other systems reviewed and are negative.   I have reviewed and confirmed the accuracy of the ROS as documented by the MA/LPN/RN Sonal Ventura MD        Objective     Vitals:    09/28/22 1147   BP: 108/74   Pulse: 72   Resp: 16   Temp: 98.2 °F (36.8 °C)   TempSrc: Temporal   SpO2: 98%   Weight: 66.4 kg (146 lb 4.8 oz)   Height: 162 cm (63.78\")   PainSc: 0-No pain     Current Status 9/28/2022   ECOG score 0       Physical Exam        CONSTITUTIONAL:  Vital signs reviewed.  Alert and oriented x3  No distress, looks comfortable.  RESPIRATORY:  Normal respiratory effort.  No rales  or wheezing, clear.   BREAST: Right breast: No skin changes, no evidence of breast mass, no nipple discharge, no evidence of any right axillary adenopathy or right supraclavicular adenopathy  Left breast: No evidence of any skin changes, no evidence of any left breast mass and no evidence of left nipple discharge as well as no left axillary adenopathy or left supraclavicular adenopathy.  CARDIOVASCULAR:  Regular rate and rhythm, no murmur  No significant lower extremity edema.  Abdomen: Soft nontender positive bowel sounds no hepatosplenomegaly  SKIN: No wounds.  No rashes.  MUSCULOSKELETAL/EXTREMITIES: No clubbing or cyanosis.  No apparent unilateral weakness.  NEURO: CN 2-12 appear intact. No focal neurological deficits noted.  PSYCHIATRIC:  Normal judgment and insight.  Normal mood and affect.      RECENT LABS:  Hematology WBC   Date Value Ref Range Status   09/28/2022 6.23 3.40 - 10.80 10*3/mm3 Final   10/30/2019 5.24 4.5 - 11.0 10*3/uL Final     RBC   Date Value Ref Range Status   09/28/2022 4.28 3.77 - 5.28 10*6/mm3 Final   10/30/2019 4.44 4.0 - 5.2 10*6/uL Final     Hemoglobin   Date Value Ref Range Status   09/28/2022 13.8 12.0 - 15.9 g/dL Final   10/30/2019 14.2 12.0 - 16.0 g/dL Final     Hematocrit   Date Value Ref " Range Status   09/28/2022 39.5 34.0 - 46.6 % Final   10/30/2019 43.7 36.0 - 46.0 % Final     Platelets   Date Value Ref Range Status   09/28/2022 226 140 - 450 10*3/mm3 Final   10/30/2019 222 140 - 440 10*3/uL Final          Assessment & Plan     *Tis N0, stage 0 right breast DCIS, 3 mm, nuclear grade 2, no invasive carcinoma lymphovascular space invasion seen, %, [place] 99%, Ki-67 5% s/p right partial mastectomy and left breast excisional biopsy with oncoplastic closure on January 18, 2022.  Final pathology on the left was benign and there was no residual disease found in the right breast at surgery.  · Patient is eligible for chemoprophylaxis with tamoxifen as she is premenopausal  · Discussed the side effects of endocrine therapy  · Tamoxifen causes hot flashes, rare chance for uterine cancer, blood clots, DVT, PE, hair thinning, rare chance for thrombocytopenia, cataracts.  Aromatase inhibitors can cause arthralgias, hot flashes, decrease in bone density, rare chance for diarrhea, also discussed about hot flashes with it.  Evista is approved for osteopenia and can also be used for prophylaxis with fewer side effects except hot flashes and rare chance for cataracts but it can improve bone density.   · As patient is premenopausal will go ahead and start her on tamoxifen  · Patient has follow-up with radiation oncology Dr. Beatris Ugarte  · Patient discussed with radiation oncology Dr. Beatris Kimbrough and decided not to take radiation  · February 2022: Started tamoxifen and tolerating well  · September 28, 2022: Patient tolerating tamoxifen very well without any hot flashes.  Patient has irregular menstrual periods likely secondary to being menopausal.  Patient seen by Dr. [facility] who was not concerned and did not ultrasound which was negative.    *Irregular menstrual periods  · Likely secondary to becoming perimenopausal    Plan  · Continue tamoxifen  · Obtain records from Dr. Rebeka Trinh her  OB/GYN  · Follow-up in 6 months with labs    Monitoring high risk medication  MD Dr. Enedina Jorge Dr.

## 2022-10-17 ENCOUNTER — OFFICE VISIT (OUTPATIENT)
Dept: SURGERY | Facility: CLINIC | Age: 47
End: 2022-10-17

## 2022-10-17 ENCOUNTER — TELEPHONE (OUTPATIENT)
Dept: SURGERY | Facility: CLINIC | Age: 47
End: 2022-10-17

## 2022-10-17 VITALS
DIASTOLIC BLOOD PRESSURE: 70 MMHG | BODY MASS INDEX: 24.92 KG/M2 | HEIGHT: 64 IN | SYSTOLIC BLOOD PRESSURE: 100 MMHG | WEIGHT: 146 LBS

## 2022-10-17 DIAGNOSIS — D24.2 INTRADUCTAL PAPILLOMA OF LEFT BREAST: ICD-10-CM

## 2022-10-17 DIAGNOSIS — R92.8 ABNORMAL FINDING ON BREAST IMAGING: Primary | ICD-10-CM

## 2022-10-17 DIAGNOSIS — R92.8 ABNORMAL FINDING ON BREAST IMAGING: ICD-10-CM

## 2022-10-17 DIAGNOSIS — D05.11 DUCTAL CARCINOMA IN SITU (DCIS) OF RIGHT BREAST: Primary | ICD-10-CM

## 2022-10-17 PROCEDURE — 99214 OFFICE O/P EST MOD 30 MIN: CPT | Performed by: NURSE PRACTITIONER

## 2022-10-17 NOTE — PROGRESS NOTES
[facility]     Referring Provider: No ref. provider found     Chief complaint:  Breast cancer follow up visit      HPI:   12/1/21:  Seen by Dr Stovall  Ms. 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 This was initially detected as an imaging abnormality on routine screening. Her work-up is detailed in the oncologic history below. Prior to the biopsy, she denies any breast lumps, pain, skin changes, or nipple discharge. She denies any prior history of abnormal mammograms or breast biopsies. She denies any family history of breast cancer. She has a family history of ovarian cancer in her maternal grandmother and pancreatic cancer in her maternal great grandmother.     12/17/21:  Seen by Dr Stovall  She underwent genetic testing which was negative for mutation. She then underwent bilateral MR-guided biopsies. This was negative on the right and on the left showed an intraductal papilloma (see report details below).     2/3/22:  Seen by Dr Stovall  She underwent right partial mastectomy, and left breast excisional biopsy with oncoplastic closure on 1/18/22. See surgery & pathology details below in oncologic history. She has been recovering well and has no complaints. She was joined today in clinic by her . She gave consent for him to be present during her examination and participate in the discussion.     5/11/22:  She returns today for follow up with some changes in her bilateral breasts since surgery that seem to be scarring.  She was seen by Dr Zapata in 4/22 at which time he felt they were c/w surgical changes, she will follow up with him in 7/22.     Given low risk of recurrence, she prefers not to take radiation.  She is tolerating tamoxifen well.    She was seen by Dr Goldmna in 2/22: , I think her risk of local recurrence is extremely low based on the small size of dcis, no residual disease on  lumpectomy specimen therefore widely negative margins and strong estrogen receptor positivity.  According to the [facility] nomogram for DCIS risk of recurrence, her risk of local recurrence at 10years is approximately 10%.  I explained that radiation would reduce the risk in half to approximately 5% at 10 years but no change in overall survival    Her last clinic visit with Dr Ventura was in 4/22:  She has started tamoxifen since February 2022, Tolerating tamoxifen very well    10/17/22, Interval History:  She returns today for follow up with no breast changes, she is tolerating tamoxifen.  She has stable bursitis in the left shoulder.  Left shoulder pain with steroid injections.  She saw Dr Zapata in 7/22 with stable findings, follow up is scheduled.    Her last clinic visit with Dr Ventura was in 9/22:  Patient tolerating tamoxifen very well without any hot flashes.    Bilateral diagnostic mammogram with tomosynthesis on 10/4/22 was stable with asymmetry in the right breast for which short term follow up is recommended, BiRads 3.  (see full report below)      Oncology/Hematology History Overview Note   09/09/2020, Screening MMG with [facility] ([facility]):  Heterogeneously dense. No dominant masses, suspicious microcalcifications or architectural distortions are identified in either breast. Stable exam without mammographic signs of malignancy.  BI-RADS 1: Negative.     Ductal carcinoma in situ (DCIS) of breast   9/12/2021 Initial Diagnosis    Ductal carcinoma in situ (DCIS) of right breast     9/13/2021 Imaging    Screening MMG with Jose ([facility]):  Heterogeneously dense. There is a group of calcifications within the upper outer quadrant of the right breast. No additional suspicious calcifications, architectural distortions or mass lesions identified.  BI-RADS 0: Incomplete.     9/24/2021 Imaging    Right Diagnostic MMG with Jose ([facility]):  As noted on the  screening mammogram, there are calcifications at the upper aspect of the right breast. The calcifications demonstrate a somewhat amorphous appearance in the CC projection. On the 90 degree view, the majority of the calcifications demonstrate evidence for layering indicating milk of calcium as a benign entity. However, there is a small cluster of calcifications noted at the posterior upper outer aspect of the right breast. This cluster of calcifications does not demonstrate evidence for layering. The findings indicate a suspicious cluster of calcifications. This cluster of calcifications is marked on the images. No suspicious masses or architectural distortions are observed.  BI-RADS 4: Suspicious.     10/28/2021 Biopsy    Right Breast, Stereotactic Biopsy ([facility]):    Right breast, stereotactic biopsy:  1. Ductal carcinoma in situ, cribriform type.  2. Nuclear grade 2 of 3.  3. Maximal dimension of DCIS is 0.3 cm.  4. Central comedo necrosis and calcifications are present.  5. No invasive carcinoma or lymphovascular invasion is identified.  6. The non-neoplastic breast shows fibrous mastopathy, apocrine hyperplasia and mild duct ectasia.    Comment - Two portions of tissue show ductal carcinoma in situ, cribriform type with central necrosis and calcifications.    ER+ (100%, strong)  NH+ (99%, strong)      [facility] PATHOLOGY REVIEW    1. Right Breast, Stereotactic Biopsy:               A. Ductal carcinoma in situ (DCIS):                            1. Low to intermediate grade cribriform DCIS with focal comedo type necrosis.                            2. DCIS focally present measuring up to at least 3 mm in greatest dimension.                            3. Microcalcification present within DCIS.                            4. Biomarkers ([facility]):                                         Estrogen Receptor: Positive (100%, Douglas 8/8)                                         Progesterone  Receptor: Positive (98%, Douglas 7/8)                                         Ki67 ([facility] performed in House) =  5%               B. No invasive carcinoma identified.     Comment: We essentially concur with the pathologist's original submitted diagnosis of intermediate grade cribriform ductal carcinoma in situ with focal comedo type necrosis and microcalcification.  Immunostain for CK5/6 performed at the submitting institution is reviewed demonstrating an intact myoepithelial layer in areas of concern.  The submitted paraffin block will be used for one H&E recut plus immunostain for Ki-67 reported above.  All slides prepared at the submitting institution in addition to their paraffin block will be returned.  Slides performed in house will be retained on file for comparison with the anticipated resection specimen.  This case was shared in internal consultation with Dr. Rogel, who concurred with the above diagnosis.     11/29/2021 Imaging    Bilateral Breast MRI ([facility]):  RIGHT BREAST:    At 11:00, 5.2 cm posterior to the nipple, there is a 0.7 cm AP dimension, 2.2 cm transverse dimension, 1.7 cm craniocaudal dimension peripherally enhancing biopsy cavity/tract with a central focus of susceptibility from a biopsy clip marking the site of biopsy-proven malignancy. No suspicious mass or nonmass enhancement is identified at the biopsy site.  At 6:00 in the posterior right breast, 6.2 cm posterior to the nipple, there is a 0.9 cm AP dimension, 0.8 cm transverse dimension, 0.5 cm craniocaudal dimension focal area of clumped nonmass enhancement, which  is suspicious.  No suspicious enhancement is identified in the right nipple or chest wall.  The visualized axilla is within normal limits.    LEFT BREAST:    At 3:00 in the anterior left breast, 3.5 cm posterior to the nipple, there is a 1.0 cm AP dimension, 1.1 cm transverse dimension, 0.7 cm craniocaudal dimension enhancing mass with irregular margins, which is  suspicious.  No suspicious enhancement is identified in the left nipple or chest wall.  The visualized axilla is within normal limits.   EXTRAMAMMARY FINDINGS:   There are no abnormally enlarged internal mammary chain lymph nodes on either side.  BI-RADS 4: Suspicious.     12/2/2021 Genetic Testing    [employer] Common Hereditary Cancers Panel (47 genes):    Negative     12/15/2021 Biopsy    Bilateral Breast, MR-Guided Biopsy ([facility]):    1.  Right Breast, 6 o'clock, Stereotatic Biopsies for Non-Mass Enhancement:                A.  Benign breast parenchyma with fibroadenomatoid hyperplasia, columnar cell hyperplasia and         stromal fibrosis.   B.  No atypical hyperplasia, in situ nor invasive carcinoma identified.      2.  Left Breast, 3 o'clock, Stereotatic Biopsies for a Mass:                A.  Sclerotic intraductal papilloma.               B.  No atypical hyperplasia, in situ nor invasive carcinoma identified.       1/18/2022 Surgery    Left breast needle-localized excisional biopsy and right needle-localized partial mastectomy with oncoplastic closure    1.  Breast, Left, Lumpectomy (3 grams):                A.  Focal sclerosis suggesting possible minimal residual sclerotic papilloma identified adjacent to         barbell shaped clip and biopsy site change.   B.  No evidence of atypia, in situ nor infiltrating carcinoma.       2.  Breast, Right, Additional Superior, Lateral and Posterior Margins, Inked and Oriented Excision:                A.  Benign breast parenchyma.     3.  Breast, Right Additional Inferior and Medial Margins, Inked and oriented Excision:                A.  Benign breast parenchyma.      4.  Breast, Right Lumpectomy (3 grams):                A.  Diana tie clip with biopsy site change identified.                 B.  No evidence of atypia, in situ nor infiltrating carcinoma.       5.  Breast, Left, Reduction Mammoplasty (56 grams):                 A.  Benign skin, subcutaneous tissue and breast  parenchyma with duct ectasia, apocrine metaplasia, columnar cell change, sclerosing adenosis and mild ductal hyperplasia.    B.  No evidence atypia, in situ nor infiltrating carcinoma.      6.  Breast, Right Reduction Mammoplasty, (38 grams).                   A.  Benign skin, subcutaneous tissue and breast parenchyma and focal mild duct ectasia, apocrine         metaplasia, and columnar cell change.      2/8/2022 Cancer Staged    Staging form: Breast, AJCC 8th Edition  - Clinical stage from 2/8/2022: Stage 0 (cTis (DCIS), cN0, cM0, ER+, UT+) - Signed by Sonal Ventura MD on 2/12/2022     10/4/2022 Imaging    Bilateral diagnostic mammogram with tomosynthesis at [facility]  Breasts are heterogeneously dense.  There are now noted to be lumpectomy changes in the mid upper central aspect of the right breast.  There are scattered punctate calcifications near the lumpectomy bed.  However, there are no suspicious clusters of calcifications.  There are now noted to be mastopexy changes in the right breast.  There is a 1.2 cm asymmetry superficial to the lumpectomy bed best seen in the MLO view.  This is likely postoperative changes from either the lumpectomy and/or mastopexy.  There are no suspicious mass lesions, suspicious calcifications or indirect signs of malignancy in the left breast.  There are now noted to be mastopexy changes in the left breast.  Impression:  Probably benign, interval follow-up suggested  BI-RADS 3  Recommendation:  Right diagnostic mammogram in 6 months.         Review of Systems:  See interval history.      Medications:    Current Outpatient Medications:   •  acyclovir (ZOVIRAX) 400 MG tablet, TAKE 1 TABLET BY MOUTH TWICE DAILY FOR 5 DAYS AS NEEDED, Disp: , Rfl:   •  cetirizine (zyrTEC) 10 MG tablet, Daily As Needed for Allergies., Disp: , Rfl:   •  chlorhexidine (PERIDEX) 0.12 % solution, RINSE WITH ONE CAPFUL TWICE DAILY, Disp: , Rfl:   •  escitalopram (LEXAPRO) 20 MG tablet,  "Take 20 mg by mouth Daily., Disp: , Rfl:   •  hydrocortisone 2.5 % ointment, APPLY TO EYELIDS EVERY NIGHT AT BEDTIME 3 TO 4 TIMES DAILY AS NEEDED FOR FLARES, Disp: , Rfl:   •  hydrOXYzine (ATARAX) 10 MG tablet, TK 1/2 TO 1 T PO Q 6 H PRA, Disp: , Rfl:   •  ibuprofen (ADVIL,MOTRIN) 800 MG tablet, Take 800 mg by mouth Every 6 (Six) Hours As Needed. for pain, Disp: , Rfl:   •  Omega-3 Fatty Acids (FISH OIL PO), Take  by mouth., Disp: , Rfl:   •  tamoxifen (NOLVADEX) 20 MG chemo tablet, TAKE 1 TABLET BY MOUTH DAILY, Disp: 90 tablet, Rfl: 1  •  tretinoin (RETIN-A) 0.025 % cream, APPLY PEA SIZED AMOUNT TOPICALLY TO FACE EVERY NIGHT, Disp: , Rfl:   •  triamcinolone (KENALOG) 0.1 % paste, As Needed., Disp: , Rfl:   •  Vitamin D, Cholecalciferol, (CHOLECALCIFEROL) 10 MCG (400 UNIT) tablet, Take 400 Units by mouth Daily., Disp: , Rfl:   •  ZOLMitriptan (ZOMIG) 5 MG tablet, Take 5 mg by mouth As Needed for Migraine., Disp: , Rfl:       Allergies   Allergen Reactions   • Penicillins Nausea Only       Family History   Problem Relation Age of Onset   • Ovarian cancer Maternal Grandmother 64   • Pancreatic cancer Maternal Great-Grandmother    • Breast cancer Mother    • Malig Hyperthermia Neg Hx        PHYSICAL EXAMINATION:      Ht 162 cm (63.78\")   Wt 66.2 kg (146 lb)   BMI 25.23 kg/m²   /70 (BP Location: Right arm)   Ht 162 cm (63.78\")   Wt 66.2 kg (146 lb)   BMI 25.23 kg/m²     I reviewed physical exam, no changes noted    ECOG 0 - Asymptomatic  General: NAD, well appearing  Psych: a&o x3, normal mood and affect  Eyes: EOMI, no scleral icterus  ENMT: neck supple without masses or thyromegaly, mucous membranes moist  MSK: normal gait, normal ROM in bilateral shoulders  Lymph nodes: no cervical, supraclavicular or axillary lymphadenopathy  Breast: symmetric, moderate size, grade 2 ptosis  Right: Sp mastopexy with well-healed incisions. Flaps and NAC healthy.  No visible abnormalities on inspection while seated, with " arms raised or hands on hips. No masses, skin changes, or nipple abnormalities.  Cording in lower aspect 0500, 6 CFN c/w surgical changes  Left: Sp mastopexy with well-healed incisions. There is an area over the lateral NAC with thickening and lighter pigment, but this appears viable.  No visible abnormalities on inspection while seated, with arms raised or hands on hips. No masses, skin changes, or nipple abnormalities.    Assessment:   1)  47 y.o. F with a diagnosis of a left breast intraductal papilloma and right breast ductal carcinoma in situ (DCIS), low to intermediate grade, ER/IN+. She underwent right partial mastectomy and left breast excisional biopsy with oncoplastic closure on 1/18/22 with benign final pathology on the left and no residual disease found in the right breast (3 mm on core), pTis.    2)  Abnormal imaiigng right breast for which short term follow up is recommended, 10/2022  1.2 cm asymmetry superficial to the lumpectomy bed    Discussion:  Imaging findings were reviewed, a copy was given.  We discussed the right breast asymmetry likely post operative changes for which short term follow up is recommended.  A right diagnostic mammogram will be arranged, patient is in agreement with this plan.    We discussed her follow up which includes clinical breast exam every 6 months for 2 years (10/2023), with mammogram annually then  mammogram and exam annually until 5 years from diagnosis (10/26).    I advised her to continue with breast massage, demonstrated technique to be done daily.    Plan:  - continue follow up with Dr. Ventura   -Continue follow-up with Dr. Zapata.  - continue massage to surgical incisions  -right diagnostic mammogram with tomosynthesis in 6 months at [facility] followed by exam  -She was instructed to call sooner with any questions, concerns or changes on BSE.    RAOUL Bustamante           CC:  RAOUL Torres PA

## 2022-11-08 PROCEDURE — U0004 COV-19 TEST NON-CDC HGH THRU: HCPCS | Performed by: NURSE PRACTITIONER

## 2022-11-14 PROCEDURE — U0004 COV-19 TEST NON-CDC HGH THRU: HCPCS | Performed by: PHYSICIAN ASSISTANT

## 2023-01-09 ENCOUNTER — TRANSCRIBE ORDERS (OUTPATIENT)
Dept: ADMINISTRATIVE | Facility: HOSPITAL | Age: 48
End: 2023-01-09
Payer: COMMERCIAL

## 2023-01-09 ENCOUNTER — HOSPITAL ENCOUNTER (OUTPATIENT)
Dept: CARDIOLOGY | Facility: HOSPITAL | Age: 48
Setting detail: HOSPITAL OUTPATIENT SURGERY
Discharge: HOME | End: 2023-01-09
Admitting: NURSE PRACTITIONER
Payer: COMMERCIAL

## 2023-01-09 DIAGNOSIS — R07.89 ATYPICAL CHEST PAIN: Primary | ICD-10-CM

## 2023-01-09 LAB — QT INTERVAL: 405 MS

## 2023-01-09 PROCEDURE — 93010 ELECTROCARDIOGRAM REPORT: CPT | Performed by: INTERNAL MEDICINE

## 2023-01-09 PROCEDURE — 93005 ELECTROCARDIOGRAM TRACING: CPT

## 2023-02-27 ENCOUNTER — TELEPHONE (OUTPATIENT)
Dept: ORTHOPEDIC SURGERY | Facility: CLINIC | Age: 48
End: 2023-02-27
Payer: COMMERCIAL

## 2023-02-27 NOTE — TELEPHONE ENCOUNTER
Caller: PATIENT     Relationship to patient: SELF     Best call back number: [phone]    Chief complaint: LEFT SHOULDER PAIN    Type of visit: INJECTION    Requested date: NEXT AVAILABLE APPT ( PATIENT IS REQUESTING AN APPT AS LATE IN THE DAY AS POSSIBLE.)    Additional notes: PATIENT WAS CALLING TO SCHEDULE AN INJECTION WITH DR. WOLF FOR THE LEFT SHOULDER. THANK YOU!           Pt seen and examined  Please see H&P dated 2/11/17    Cain Alvares MD  [phone]  Critical Care Medicine  12:10 PM 2/11/2017

## 2023-03-13 ENCOUNTER — OFFICE VISIT (OUTPATIENT)
Dept: ORTHOPEDIC SURGERY | Facility: CLINIC | Age: 48
End: 2023-03-13
Payer: COMMERCIAL

## 2023-03-13 VITALS — WEIGHT: 147 LBS | HEIGHT: 65 IN | BODY MASS INDEX: 24.49 KG/M2 | HEART RATE: 69 BPM

## 2023-03-13 DIAGNOSIS — M25.512 LEFT SHOULDER PAIN, UNSPECIFIED CHRONICITY: Primary | ICD-10-CM

## 2023-03-13 PROCEDURE — 20610 DRAIN/INJ JOINT/BURSA W/O US: CPT | Performed by: ORTHOPAEDIC SURGERY

## 2023-03-13 RX ORDER — ONDANSETRON 4 MG/1
4 TABLET, FILM COATED ORAL EVERY 8 HOURS PRN
COMMUNITY
Start: 2022-12-05

## 2023-03-13 RX ORDER — MECLIZINE HYDROCHLORIDE 25 MG/1
TABLET ORAL
COMMUNITY
Start: 2023-01-09

## 2023-03-13 RX ORDER — TRIAMCINOLONE ACETONIDE 40 MG/ML
40 INJECTION, SUSPENSION INTRA-ARTICULAR; INTRAMUSCULAR ONCE
Status: COMPLETED | OUTPATIENT
Start: 2023-03-13 | End: 2023-03-13

## 2023-03-13 RX ADMIN — TRIAMCINOLONE ACETONIDE 40 MG: 40 INJECTION, SUSPENSION INTRA-ARTICULAR; INTRAMUSCULAR at 03:00

## 2023-03-13 NOTE — PROGRESS NOTES
"     Patient ID: Ara Diamond is a 48 y.o. female.  Shoulder pain  Returns with left shoulder pain did well with steroid injection in July 2022 but pain returned last 3 to 4 weeks mainly in the impingement area    Review of Systems:        Objective:    Pulse 69   Ht 165.1 cm (65\")   Wt 66.7 kg (147 lb)   BMI 24.46 kg/m²     Physical Examination:      Left shoulder demonstrates intact skin with no swelling.  No redness.  Passive elevation 180 abduction 150 external rotation 40 internal rotation L5 with no pain and no weakness on [employer] supraspinatus testing with positive impingement sign    Imaging:       Assessment:    Left shoulder bursitis    Plan:   I recommend injection after todays evaluation.  Risks and benefits were discussed. Under sterile technique and after timeout and verbal consent I injected 40 mg of Kenalog and 1 mL of 1% Lidocaine plain into the subacromial space. It was well tolerated.      Procedures          Disclaimer: Part of this note may be an electronic transcription/translation of spoken language to printed text using the Dragon Dictation System  "

## 2023-03-23 RX ORDER — TAMOXIFEN CITRATE 20 MG/1
20 TABLET ORAL DAILY
Qty: 90 TABLET | Refills: 1 | Status: SHIPPED | OUTPATIENT
Start: 2023-03-23

## 2023-04-18 ENCOUNTER — OFFICE VISIT (OUTPATIENT)
Dept: SURGERY | Facility: CLINIC | Age: 48
End: 2023-04-18
Payer: COMMERCIAL

## 2023-04-18 VITALS
DIASTOLIC BLOOD PRESSURE: 70 MMHG | SYSTOLIC BLOOD PRESSURE: 110 MMHG | WEIGHT: 147 LBS | HEIGHT: 65 IN | BODY MASS INDEX: 24.49 KG/M2

## 2023-04-18 DIAGNOSIS — D24.2 INTRADUCTAL PAPILLOMA OF LEFT BREAST: ICD-10-CM

## 2023-04-18 DIAGNOSIS — D05.11 DUCTAL CARCINOMA IN SITU (DCIS) OF RIGHT BREAST: Primary | ICD-10-CM

## 2023-04-18 DIAGNOSIS — R92.8 ABNORMAL FINDING ON BREAST IMAGING: ICD-10-CM

## 2023-04-18 PROCEDURE — 99214 OFFICE O/P EST MOD 30 MIN: CPT | Performed by: NURSE PRACTITIONER

## 2023-04-18 NOTE — PROGRESS NOTES
[facility]     Referring Provider: No ref. provider found     Chief complaint:  Breast cancer follow up visit      HPI:   12/1/21:  Seen by Dr Stovall  Ms. 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 This was initially detected as an imaging abnormality on routine screening. Her work-up is detailed in the oncologic history below. Prior to the biopsy, she denies any breast lumps, pain, skin changes, or nipple discharge. She denies any prior history of abnormal mammograms or breast biopsies. She denies any family history of breast cancer. She has a family history of ovarian cancer in her maternal grandmother and pancreatic cancer in her maternal great grandmother.     12/17/21:  Seen by Dr Stovall  She underwent genetic testing which was negative for mutation. She then underwent bilateral MR-guided biopsies. This was negative on the right and on the left showed an intraductal papilloma (see report details below).     2/3/22:  Seen by Dr Stovall  She underwent right partial mastectomy, and left breast excisional biopsy with oncoplastic closure on 1/18/22. See surgery & pathology details below in oncologic history. She has been recovering well and has no complaints. She was joined today in clinic by her . She gave consent for him to be present during her examination and participate in the discussion.     5/11/22:  She returns today for follow up with some changes in her bilateral breasts since surgery that seem to be scarring.  She was seen by Dr Zapata in 4/22 at which time he felt they were c/w surgical changes, she will follow up with him in 7/22.     Given low risk of recurrence, she prefers not to take radiation.  She is tolerating tamoxifen well.    She was seen by Dr Goldman in 2/22: , I think her risk of local recurrence is extremely low based on the small size of dcis, no residual disease on  lumpectomy specimen therefore widely negative margins and strong estrogen receptor positivity.  According to the [facility] nomogram for DCIS risk of recurrence, her risk of local recurrence at 10years is approximately 10%.  I explained that radiation would reduce the risk in half to approximately 5% at 10 years but no change in overall survival    Her last clinic visit with Dr Ventura was in 4/22:  She has started tamoxifen since February 2022, Tolerating tamoxifen very well    10/17/22:  She returns today for follow up with no breast changes, she is tolerating tamoxifen.  She has stable bursitis in the left shoulder.  Left shoulder pain with steroid injections.  She saw Dr Zapata in 7/22 with stable findings, follow up is scheduled.    Her last clinic visit with Dr Ventura was in 9/22:  Patient tolerating tamoxifen very well without any hot flashes.    Bilateral diagnostic mammogram with tomosynthesis on 10/4/22 was stable with asymmetry in the right breast for which short term follow up is recommended, BiRads 3.  (see full report below)    4/18/2023, Interval History:  She returns today for follow up with no breast concerns or health changes.  She is tolerating tamoxifen well.  Recently she has been experiencing shoulder discomfort with steroid injections for left shoulder bursitis.    She has not been seen by medical oncology since 9/22.  Follow up is scheduled in the near future.    Right diagnostic mammogram on 4/4/23 was stable, BiRAds 3.  (see full report below)        Oncology/Hematology History Overview Note   09/09/2020, Screening MMG with Jose ([facility]):  Heterogeneously dense. No dominant masses, suspicious microcalcifications or architectural distortions are identified in either breast. Stable exam without mammographic signs of malignancy.  BI-RADS 1: Negative.     Ductal carcinoma in situ (DCIS) of breast   9/12/2021 Initial Diagnosis    Ductal carcinoma in situ (DCIS)  of right breast     9/13/2021 Imaging    Screening MMG with Jose ([facility]):  Heterogeneously dense. There is a group of calcifications within the upper outer quadrant of the right breast. No additional suspicious calcifications, architectural distortions or mass lesions identified.  BI-RADS 0: Incomplete.     9/24/2021 Imaging    Right Diagnostic MMG with Jose ([facility]):  As noted on the screening mammogram, there are calcifications at the upper aspect of the right breast. The calcifications demonstrate a somewhat amorphous appearance in the CC projection. On the 90 degree view, the majority of the calcifications demonstrate evidence for layering indicating milk of calcium as a benign entity. However, there is a small cluster of calcifications noted at the posterior upper outer aspect of the right breast. This cluster of calcifications does not demonstrate evidence for layering. The findings indicate a suspicious cluster of calcifications. This cluster of calcifications is marked on the images. No suspicious masses or architectural distortions are observed.  BI-RADS 4: Suspicious.     10/28/2021 Biopsy    Right Breast, Stereotactic Biopsy ([facility]):    Right breast, stereotactic biopsy:  1. Ductal carcinoma in situ, cribriform type.  2. Nuclear grade 2 of 3.  3. Maximal dimension of DCIS is 0.3 cm.  4. Central comedo necrosis and calcifications are present.  5. No invasive carcinoma or lymphovascular invasion is identified.  6. The non-neoplastic breast shows fibrous mastopathy, apocrine hyperplasia and mild duct ectasia.    Comment - Two portions of tissue show ductal carcinoma in situ, cribriform type with central necrosis and calcifications.    ER+ (100%, strong)  IN+ (99%, strong)      [facility] PATHOLOGY REVIEW    1. Right Breast, Stereotactic Biopsy:               A. Ductal carcinoma in situ (DCIS):                            1. Low to  intermediate grade cribriform DCIS with focal comedo type necrosis.                            2. DCIS focally present measuring up to at least 3 mm in greatest dimension.                            3. Microcalcification present within DCIS.                            4. Biomarkers ([facility]):                                         Estrogen Receptor: Positive (100%, Douglas 8/8)                                         Progesterone Receptor: Positive (98%, Douglas 7/8)                                         Ki67 ([facility] performed in House) =  5%               B. No invasive carcinoma identified.     Comment: We essentially concur with the pathologist's original submitted diagnosis of intermediate grade cribriform ductal carcinoma in situ with focal comedo type necrosis and microcalcification.  Immunostain for CK5/6 performed at the submitting institution is reviewed demonstrating an intact myoepithelial layer in areas of concern.  The submitted paraffin block will be used for one H&E recut plus immunostain for Ki-67 reported above.  All slides prepared at the submitting institution in addition to their paraffin block will be returned.  Slides performed in house will be retained on file for comparison with the anticipated resection specimen.  This case was shared in internal consultation with Dr. Rogel, who concurred with the above diagnosis.     11/29/2021 Imaging    Bilateral Breast MRI ([facility]):  RIGHT BREAST:    At 11:00, 5.2 cm posterior to the nipple, there is a 0.7 cm AP dimension, 2.2 cm transverse dimension, 1.7 cm craniocaudal dimension peripherally enhancing biopsy cavity/tract with a central focus of susceptibility from a biopsy clip marking the site of biopsy-proven malignancy. No suspicious mass or nonmass enhancement is identified at the biopsy site.  At 6:00 in the posterior right breast, 6.2 cm posterior to the nipple, there is a 0.9 cm AP dimension, 0.8 cm transverse dimension, 0.5 cm craniocaudal dimension  focal area of clumped nonmass enhancement, which  is suspicious.  No suspicious enhancement is identified in the right nipple or chest wall.  The visualized axilla is within normal limits.    LEFT BREAST:    At 3:00 in the anterior left breast, 3.5 cm posterior to the nipple, there is a 1.0 cm AP dimension, 1.1 cm transverse dimension, 0.7 cm craniocaudal dimension enhancing mass with irregular margins, which is suspicious.  No suspicious enhancement is identified in the left nipple or chest wall.  The visualized axilla is within normal limits.   EXTRAMAMMARY FINDINGS:   There are no abnormally enlarged internal mammary chain lymph nodes on either side.  BI-RADS 4: Suspicious.     12/2/2021 Genetic Testing    [employer] Common Hereditary Cancers Panel (47 genes):    Negative     12/15/2021 Biopsy    Bilateral Breast, MR-Guided Biopsy ( [facility]):    1.  Right Breast, 6 o'clock, Stereotatic Biopsies for Non-Mass Enhancement:                A.  Benign breast parenchyma with fibroadenomatoid hyperplasia, columnar cell hyperplasia and         stromal fibrosis.   B.  No atypical hyperplasia, in situ nor invasive carcinoma identified.      2.  Left Breast, 3 o'clock, Stereotatic Biopsies for a Mass:                A.  Sclerotic intraductal papilloma.               B.  No atypical hyperplasia, in situ nor invasive carcinoma identified.       1/18/2022 Surgery    Left breast needle-localized excisional biopsy and right needle-localized partial mastectomy with oncoplastic closure    1.  Breast, Left, Lumpectomy (3 grams):                A.  Focal sclerosis suggesting possible minimal residual sclerotic papilloma identified adjacent to         barbell shaped clip and biopsy site change.   B.  No evidence of atypia, in situ nor infiltrating carcinoma.       2.  Breast, Right, Additional Superior, Lateral and Posterior Margins, Inked and Oriented Excision:                A.  Benign breast parenchyma.     3.  Breast, Right Additional  Inferior and Medial Margins, Inked and oriented Excision:                A.  Benign breast parenchyma.      4.  Breast, Right Lumpectomy (3 grams):                A.  [place] tie clip with biopsy site change identified.                 B.  No evidence of atypia, in situ nor infiltrating carcinoma.       5.  Breast, Left, Reduction Mammoplasty (56 grams):                 A.  Benign skin, subcutaneous tissue and breast parenchyma with duct ectasia, apocrine metaplasia, columnar cell change, sclerosing adenosis and mild ductal hyperplasia.    B.  No evidence atypia, in situ nor infiltrating carcinoma.      6.  Breast, Right Reduction Mammoplasty, (38 grams).                   A.  Benign skin, subcutaneous tissue and breast parenchyma and focal mild duct ectasia, apocrine         metaplasia, and columnar cell change.      2/8/2022 Cancer Staged    Staging form: Breast, AJCC 8th Edition  - Clinical stage from 2/8/2022: Stage 0 (cTis (DCIS), cN0, cM0, ER+, NM+) - Signed by Sonal Ventura MD on 2/12/2022     10/4/2022 Imaging    Bilateral diagnostic mammogram with tomosynthesis at [facility]  Breasts are heterogeneously dense.  There are now noted to be lumpectomy changes in the mid upper central aspect of the right breast.  There are scattered punctate calcifications near the lumpectomy bed.  However, there are no suspicious clusters of calcifications.  There are now noted to be mastopexy changes in the right breast.  There is a 1.2 cm asymmetry superficial to the lumpectomy bed best seen in the MLO view.  This is likely postoperative changes from either the lumpectomy and/or mastopexy.  There are no suspicious mass lesions, suspicious calcifications or indirect signs of malignancy in the left breast.  There are now noted to be mastopexy changes in the left breast.  Impression:  Probably benign, interval follow-up suggested  BI-RADS 3  Recommendation:  Right diagnostic mammogram in 6 months.     4/4/2023  Imaging    Right diagnostic mammogram with tomosynthesis at [facility]  Breast is heterogeneously dense.  There are lumpectomy changes in the mid upper central aspect of the right breast.  There are scattered punctate calcifications near the lumpectomy bed.  However, there are no suspicious clusters of calcifications.  There are again noted to be mastopexy changes in the right breast.  There is again noted to be a 1.2 cm asymmetry superficial to the lumpectomy bed best seen in the MLO view.  This is likely postoperative changes from either the lumpectomy and/or the mastopexy.  Impression:  Probably benign, interval follow-up suggested  BI-RADS 3  Recommendation:  Bilateral diagnostic mammogram in 6 months         Review of Systems:  See interval history.      Medications:    Current Outpatient Medications:   •  acyclovir (ZOVIRAX) 400 MG tablet, TAKE 1 TABLET BY MOUTH TWICE DAILY FOR 5 DAYS AS NEEDED, Disp: , Rfl:   •  cetirizine (zyrTEC) 10 MG tablet, Daily As Needed for Allergies., Disp: , Rfl:   •  escitalopram (LEXAPRO) 20 MG tablet, Take 1 tablet by mouth Daily., Disp: , Rfl:   •  hydrocortisone 2.5 % ointment, APPLY TO EYELIDS EVERY NIGHT AT BEDTIME 3 TO 4 TIMES DAILY AS NEEDED FOR FLARES, Disp: , Rfl:   •  hydrOXYzine (ATARAX) 10 MG tablet, TK 1/2 TO 1 T PO Q 6 H PRA, Disp: , Rfl:   •  ibuprofen (ADVIL,MOTRIN) 800 MG tablet, Take 1 tablet by mouth Every 6 (Six) Hours As Needed. for pain, Disp: , Rfl:   •  meclizine (ANTIVERT) 25 MG tablet, TAKE 1 TABLET BY MOUTH THREE TIMES DAILY FOR UP TO 20 DAYS AS NEEDED FOR DIZZINESS, Disp: , Rfl:   •  Omega-3 Fatty Acids (FISH OIL PO), Take  by mouth., Disp: , Rfl:   •  ondansetron (ZOFRAN) 4 MG tablet, Take 1 tablet by mouth Every 8 (Eight) Hours As Needed. for nausea, Disp: , Rfl:   •  tamoxifen (NOLVADEX) 20 MG chemo tablet, TAKE 1 TABLET BY MOUTH DAILY, Disp: 90 tablet, Rfl: 1  •  tretinoin (RETIN-A) 0.025 % cream, APPLY PEA SIZED AMOUNT TOPICALLY TO  "FACE EVERY NIGHT, Disp: , Rfl:   •  triamcinolone (KENALOG) 0.1 % paste, As Needed., Disp: , Rfl:   •  Vitamin D, Cholecalciferol, (CHOLECALCIFEROL) 10 MCG (400 UNIT) tablet, Take 1 tablet by mouth Daily., Disp: , Rfl:   •  ZOLMitriptan (ZOMIG) 5 MG tablet, Take 1 tablet by mouth As Needed for Migraine., Disp: , Rfl:       Allergies   Allergen Reactions   • Penicillins Nausea Only       Family History   Problem Relation Age of Onset   • Ovarian cancer Maternal Grandmother 64   • Pancreatic cancer Maternal Great-Grandmother    • Breast cancer Mother    • Malig Hyperthermia Neg Hx        PHYSICAL EXAMINATION:      /70 (BP Location: Left arm)   Ht 165.1 cm (65\")   Wt 66.7 kg (147 lb)   BMI 24.46 kg/m²       I reviewed physical exam, no changes noted    ECOG 0 - Asymptomatic  General: NAD, well appearing  Psych: a&o x3, normal mood and affect  Eyes: EOMI, no scleral icterus  ENMT: neck supple without masses or thyromegaly, mucous membranes moist  MSK: normal gait, normal ROM in bilateral shoulders  Lymph nodes: no cervical, supraclavicular or axillary lymphadenopathy  Breast: symmetric, moderate size, grade 2 ptosis  Right: Sp mastopexy with well-healed incisions. Flaps and NAC healthy.  No visible abnormalities on inspection while seated, with arms raised or hands on hips. No masses, skin changes, or nipple abnormalities.    Left: Sp mastopexy with well-healed incisions. There is an area over the lateral NAC with thickening and lighter pigment, but this appears viable.  No visible abnormalities on inspection while seated, with arms raised or hands on hips. No masses, skin changes, or nipple abnormalities.  Mild incision thickening c/w keloid inframmary fold.    Assessment:   1)  48 y.o. F with a diagnosis of a left breast intraductal papilloma and right breast ductal carcinoma in situ (DCIS)10/2021, low to intermediate grade, ER/[place]+. She underwent right partial mastectomy and left breast excisional biopsy with " oncoplastic closure on 1/18/22 with benign final pathology on the left and no residual disease found in the right breast (3 mm on core), pTis.    2)  Abnormal imaiigng right breast for which short term follow up is recommended, 10/2022  1.2 cm asymmetry superficial to the lumpectomy bed    Discussion:  Imaging findings were reviewed, a copy was given.  We discussed the right breast asymmetry likely post operative changes for which short term follow up is recommended.  A bilateral diagnostic mammogram will be scheduled for 6 months, patient is in agreement with this plan.    We discussed her follow up which includes clinical breast exam every 6 months for 2 years (10/2023), with mammogram annually then  mammogram and exam annually until 5 years from diagnosis (10/26).    I advised her to continue with breast massage, demonstrated technique to be done daily.    Plan:  - continue follow up with Dr. Ventura   -Continue follow-up with Dr. Zapata.  - continue massage to surgical incisions  -bilateral diagnostic mammogram with tomosynthesis in 6 months at [facility] followed by exam  -She was instructed to call sooner with any questions, concerns or changes on BSE.    RAOUL Bustamante           CC:  RAOUL Torres PA

## 2023-04-18 NOTE — LETTER
April 18, 2023     RAOUL Torres  [address] [place] [address]    Patient: Ara Diamond   YOB: 1975   Date of Visit: 4/18/2023       Dear RAOUL Nascimento:    Thank you for referring Ara Diamond to me for evaluation. Below are the relevant portions of my assessment and plan of care.    If you have questions, please do not hesitate to call me. I look forward to following Ara along with you.         Sincerely,        RAOUL Bustamante        CC: KRISTINE Pickett Mary Ellen, APRN  04/18/23 0842  Sign when Signing Visit  [facility]     Referring Provider: No ref. provider found     Chief complaint:  Breast cancer follow up visit      HPI:   12/1/21:  Seen by Dr Stovall  Ms. 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 This was initially detected as an imaging abnormality on routine screening. Her work-up is detailed in the oncologic history below. Prior to the biopsy, she denies any breast lumps, pain, skin changes, or nipple discharge. She denies any prior history of abnormal mammograms or breast biopsies. She denies any family history of breast cancer. She has a family history of ovarian cancer in her maternal grandmother and pancreatic cancer in her maternal great grandmother.     12/17/21:  Seen by Dr Stovall  She underwent genetic testing which was negative for mutation. She then underwent bilateral MR-guided biopsies. This was negative on the right and on the left showed an intraductal papilloma (see report details below).     2/3/22:  Seen by Dr Stovall  She underwent right partial mastectomy, and left breast excisional biopsy with oncoplastic closure on 1/18/22. See surgery & pathology details below in oncologic history. She has been recovering well and has no complaints. She was joined today in clinic by her . She gave consent for  him to be present during her examination and participate in the discussion.     5/11/22:  She returns today for follow up with some changes in her bilateral breasts since surgery that seem to be scarring.  She was seen by Dr aZpata in 4/22 at which time he felt they were c/w surgical changes, she will follow up with him in 7/22.     Given low risk of recurrence, she prefers not to take radiation.  She is tolerating tamoxifen well.    She was seen by Dr Goldman in 2/22: , I think her risk of local recurrence is extremely low based on the small size of dcis, no residual disease on lumpectomy specimen therefore widely negative margins and strong estrogen receptor positivity.  According to the [facility] [place] [facility] nomogram for DCIS risk of recurrence, her risk of local recurrence at 10years is approximately 10%.  I explained that radiation would reduce the risk in half to approximately 5% at 10 years but no change in overall survival    Her last clinic visit with Dr Ventura was in 4/22:  She has started tamoxifen since February 2022, Tolerating tamoxifen very well    10/17/22:  She returns today for follow up with no breast changes, she is tolerating tamoxifen.  She has stable bursitis in the left shoulder.  Left shoulder pain with steroid injections.  She saw Dr Zapata in 7/22 with stable findings, follow up is scheduled.    Her last clinic visit with Dr Ventura was in 9/22:  Patient tolerating tamoxifen very well without any hot flashes.    Bilateral diagnostic mammogram with tomosynthesis on 10/4/22 was stable with asymmetry in the right breast for which short term follow up is recommended, BiRads 3.  (see full report below)    4/18/2023, Interval History:  She returns today for follow up with no breast concerns or health changes.  She is tolerating tamoxifen well.  Recently she has been experiencing shoulder discomfort with steroid injections for left shoulder bursitis.    She has not been seen by medical  oncology since 9/22.  Follow up is scheduled in the near future.    Right diagnostic mammogram on 4/4/23 was stable, BiRAds 3.  (see full report below)        Oncology/Hematology History Overview Note   09/09/2020, Screening MMG with Jose ([facility]):  Heterogeneously dense. No dominant masses, suspicious microcalcifications or architectural distortions are identified in either breast. Stable exam without mammographic signs of malignancy.  BI-RADS 1: Negative.     Ductal carcinoma in situ (DCIS) of breast   9/12/2021 Initial Diagnosis    Ductal carcinoma in situ (DCIS) of right breast     9/13/2021 Imaging    Screening MMG with Jose ([facility]):  Heterogeneously dense. There is a group of calcifications within the upper outer quadrant of the right breast. No additional suspicious calcifications, architectural distortions or mass lesions identified.  BI-RADS 0: Incomplete.     9/24/2021 Imaging    Right Diagnostic MMG with Jose ([facility]):  As noted on the screening mammogram, there are calcifications at the upper aspect of the right breast. The calcifications demonstrate a somewhat amorphous appearance in the CC projection. On the 90 degree view, the majority of the calcifications demonstrate evidence for layering indicating milk of calcium as a benign entity. However, there is a small cluster of calcifications noted at the posterior upper outer aspect of the right breast. This cluster of calcifications does not demonstrate evidence for layering. The findings indicate a suspicious cluster of calcifications. This cluster of calcifications is marked on the images. No suspicious masses or architectural distortions are observed.  BI-RADS 4: Suspicious.     10/28/2021 Biopsy    Right Breast, Stereotactic Biopsy ([facility]):    Right breast, stereotactic biopsy:  1. Ductal carcinoma in situ, cribriform type.  2. Nuclear grade 2 of 3.  3. Maximal dimension of DCIS is  0.3 cm.  4. Central comedo necrosis and calcifications are present.  5. No invasive carcinoma or lymphovascular invasion is identified.  6. The non-neoplastic breast shows fibrous mastopathy, apocrine hyperplasia and mild duct ectasia.    Comment - Two portions of tissue show ductal carcinoma in situ, cribriform type with central necrosis and calcifications.    ER+ (100%, strong)  AK+ (99%, strong)      [facility] PATHOLOGY REVIEW    1. Right Breast, Stereotactic Biopsy:               A. Ductal carcinoma in situ (DCIS):                            1. Low to intermediate grade cribriform DCIS with focal comedo type necrosis.                            2. DCIS focally present measuring up to at least 3 mm in greatest dimension.                            3. Microcalcification present within DCIS.                            4. Biomarkers ([facility]):                                         Estrogen Receptor: Positive (100%, Douglas 8/8)                                         Progesterone Receptor: Positive (98%, Douglas 7/8)                                         Ki67 ([facility] performed in House) =  5%               B. No invasive carcinoma identified.     Comment: We essentially concur with the pathologist's original submitted diagnosis of intermediate grade cribriform ductal carcinoma in situ with focal comedo type necrosis and microcalcification.  Immunostain for CK5/6 performed at the submitting institution is reviewed demonstrating an intact myoepithelial layer in areas of concern.  The submitted paraffin block will be used for one H&E recut plus immunostain for Ki-67 reported above.  All slides prepared at the submitting institution in addition to their paraffin block will be returned.  Slides performed in house will be retained on file for comparison with the anticipated resection specimen.  This case was shared in internal consultation with Dr. Rogel, who concurred with the above diagnosis.     11/29/2021  Imaging    Bilateral Breast MRI ( [facility]):  RIGHT BREAST:    At 11:00, 5.2 cm posterior to the nipple, there is a 0.7 cm AP dimension, 2.2 cm transverse dimension, 1.7 cm craniocaudal dimension peripherally enhancing biopsy cavity/tract with a central focus of susceptibility from a biopsy clip marking the site of biopsy-proven malignancy. No suspicious mass or nonmass enhancement is identified at the biopsy site.  At 6:00 in the posterior right breast, 6.2 cm posterior to the nipple, there is a 0.9 cm AP dimension, 0.8 cm transverse dimension, 0.5 cm craniocaudal dimension focal area of clumped nonmass enhancement, which  is suspicious.  No suspicious enhancement is identified in the right nipple or chest wall.  The visualized axilla is within normal limits.    LEFT BREAST:    At 3:00 in the anterior left breast, 3.5 cm posterior to the nipple, there is a 1.0 cm AP dimension, 1.1 cm transverse dimension, 0.7 cm craniocaudal dimension enhancing mass with irregular margins, which is suspicious.  No suspicious enhancement is identified in the left nipple or chest wall.  The visualized axilla is within normal limits.   EXTRAMAMMARY FINDINGS:   There are no abnormally enlarged internal mammary chain lymph nodes on either side.  BI-RADS 4: Suspicious.     12/2/2021 Genetic Testing    [employer] Common Hereditary Cancers Panel (47 genes):    Negative     12/15/2021 Biopsy    Bilateral Breast, [facility]    1.  Right Breast, 6 o'clock, Stereotatic Biopsies for Non-Mass Enhancement:                A.  Benign breast parenchyma with fibroadenomatoid hyperplasia, columnar cell hyperplasia and         stromal fibrosis.   B.  No atypical hyperplasia, in situ nor invasive carcinoma identified.      2.  Left Breast, 3 o'clock, Stereotatic Biopsies for a Mass:                A.  Sclerotic intraductal papilloma.               B.  No atypical hyperplasia, in situ nor invasive carcinoma identified.       1/18/2022 Surgery     Left breast needle-localized excisional biopsy and right needle-localized partial mastectomy with oncoplastic closure    1.  Breast, Left, Lumpectomy (3 grams):                A.  Focal sclerosis suggesting possible minimal residual sclerotic papilloma identified adjacent to         barbell shaped clip and biopsy site change.   B.  No evidence of atypia, in situ nor infiltrating carcinoma.       2.  Breast, Right, Additional Superior, Lateral and Posterior Margins, Inked and Oriented Excision:                A.  Benign breast parenchyma.     3.  Breast, Right Additional Inferior and Medial Margins, Inked and oriented Excision:                A.  Benign breast parenchyma.      4.  Breast, Right Lumpectomy (3 grams):                A.  Fitzgerald tie clip with biopsy site change identified.                 B.  No evidence of atypia, in situ nor infiltrating carcinoma.       5.  Breast, Left, Reduction Mammoplasty (56 grams):                 A.  Benign skin, subcutaneous tissue and breast parenchyma with duct ectasia, apocrine metaplasia, columnar cell change, sclerosing adenosis and mild ductal hyperplasia.    B.  No evidence atypia, in situ nor infiltrating carcinoma.      6.  Breast, Right Reduction Mammoplasty, (38 grams).                   A.  Benign skin, subcutaneous tissue and breast parenchyma and focal mild duct ectasia, apocrine         metaplasia, and columnar cell change.      2/8/2022 Cancer Staged    Staging form: Breast, AJCC 8th Edition  - Clinical stage from 2/8/2022: Stage 0 (cTis (DCIS), cN0, cM0, ER+, WV+) - Signed by Sonal Ventura MD on 2/12/2022     10/4/2022 Imaging    Bilateral diagnostic mammogram with tomosynthesis at [facility]  Breasts are heterogeneously dense.  There are now noted to be lumpectomy changes in the mid upper central aspect of the right breast.  There are scattered punctate calcifications near the lumpectomy bed.  However, there are no suspicious clusters of  calcifications.  There are now noted to be mastopexy changes in the right breast.  There is a 1.2 cm asymmetry superficial to the lumpectomy bed best seen in the MLO view.  This is likely postoperative changes from either the lumpectomy and/or mastopexy.  There are no suspicious mass lesions, suspicious calcifications or indirect signs of malignancy in the left breast.  There are now noted to be mastopexy changes in the left breast.  Impression:  Probably benign, interval follow-up suggested  BI-RADS 3  Recommendation:  Right diagnostic mammogram in 6 months.     4/4/2023 Imaging    Right diagnostic mammogram with tomosynthesis at [facility]  Breast is heterogeneously dense.  There are lumpectomy changes in the mid upper central aspect of the right breast.  There are scattered punctate calcifications near the lumpectomy bed.  However, there are no suspicious clusters of calcifications.  There are again noted to be mastopexy changes in the right breast.  There is again noted to be a 1.2 cm asymmetry superficial to the lumpectomy bed best seen in the MLO view.  This is likely postoperative changes from either the lumpectomy and/or the mastopexy.  Impression:  Probably benign, interval follow-up suggested  BI-RADS 3  Recommendation:  Bilateral diagnostic mammogram in 6 months         Review of Systems:  See interval history.      Medications:    Current Outpatient Medications:   •  acyclovir (ZOVIRAX) 400 MG tablet, TAKE 1 TABLET BY MOUTH TWICE DAILY FOR 5 DAYS AS NEEDED, Disp: , Rfl:   •  cetirizine (zyrTEC) 10 MG tablet, Daily As Needed for Allergies., Disp: , Rfl:   •  escitalopram (LEXAPRO) 20 MG tablet, Take 1 tablet by mouth Daily., Disp: , Rfl:   •  hydrocortisone 2.5 % ointment, APPLY TO EYELIDS EVERY NIGHT AT BEDTIME 3 TO 4 TIMES DAILY AS NEEDED FOR FLARES, Disp: , Rfl:   •  hydrOXYzine (ATARAX) 10 MG tablet, TK 1/2 TO 1 T PO Q 6 H PRA, Disp: , Rfl:   •  ibuprofen (ADVIL,MOTRIN) 800 MG tablet,  "Take 1 tablet by mouth Every 6 (Six) Hours As Needed. for pain, Disp: , Rfl:   •  meclizine (ANTIVERT) 25 MG tablet, TAKE 1 TABLET BY MOUTH THREE TIMES DAILY FOR UP TO 20 DAYS AS NEEDED FOR DIZZINESS, Disp: , Rfl:   •  Omega-3 Fatty Acids (FISH OIL PO), Take  by mouth., Disp: , Rfl:   •  ondansetron (ZOFRAN) 4 MG tablet, Take 1 tablet by mouth Every 8 (Eight) Hours As Needed. for nausea, Disp: , Rfl:   •  tamoxifen (NOLVADEX) 20 MG chemo tablet, TAKE 1 TABLET BY MOUTH DAILY, Disp: 90 tablet, Rfl: 1  •  tretinoin (RETIN-A) 0.025 % cream, APPLY PEA SIZED AMOUNT TOPICALLY TO FACE EVERY NIGHT, Disp: , Rfl:   •  triamcinolone (KENALOG) 0.1 % paste, As Needed., Disp: , Rfl:   •  Vitamin D, Cholecalciferol, (CHOLECALCIFEROL) 10 MCG (400 UNIT) tablet, Take 1 tablet by mouth Daily., Disp: , Rfl:   •  ZOLMitriptan (ZOMIG) 5 MG tablet, Take 1 tablet by mouth As Needed for Migraine., Disp: , Rfl:       Allergies   Allergen Reactions   • Penicillins Nausea Only       Family History   Problem Relation Age of Onset   • Ovarian cancer Maternal Grandmother 64   • Pancreatic cancer Maternal Great-Grandmother    • Breast cancer Mother    • Malig Hyperthermia Neg Hx        PHYSICAL EXAMINATION:      /70 (BP Location: Left arm)   Ht 165.1 cm (65\")   Wt 66.7 kg (147 lb)   BMI 24.46 kg/m²       I reviewed physical exam, no changes noted    ECOG 0 - Asymptomatic  General: NAD, well appearing  Psych: a&o x3, normal mood and affect  Eyes: EOMI, no scleral icterus  ENMT: neck supple without masses or thyromegaly, mucous membranes moist  MSK: normal gait, normal ROM in bilateral shoulders  Lymph nodes: no cervical, supraclavicular or axillary lymphadenopathy  Breast: symmetric, moderate size, grade 2 ptosis  Right: Sp mastopexy with well-healed incisions. Flaps and NAC healthy.  No visible abnormalities on inspection while seated, with arms raised or hands on hips. No masses, skin changes, or nipple abnormalities.    Left: Sp " mastopexy with well-healed incisions. There is an area over the lateral NAC with thickening and lighter pigment, but this appears viable.  No visible abnormalities on inspection while seated, with arms raised or hands on hips. No masses, skin changes, or nipple abnormalities.  Mild incision thickening c/w keloid inframmary fold.    Assessment:   1)  48 y.o. F with a diagnosis of a left breast intraductal papilloma and right breast ductal carcinoma in situ (DCIS)10/2021, low to intermediate grade, ER/CA+. She underwent right partial mastectomy and left breast excisional biopsy with oncoplastic closure on 1/18/22 with benign final pathology on the left and no residual disease found in the right breast (3 mm on core), pTis.    2)  Abnormal imaiigng right breast for which short term follow up is recommended, 10/2022  1.2 cm asymmetry superficial to the lumpectomy bed    Discussion:  Imaging findings were reviewed, a copy was given.  We discussed the right breast asymmetry likely post operative changes for which short term follow up is recommended.  A bilateral diagnostic mammogram will be scheduled for 6 months, patient is in agreement with this plan.    We discussed her follow up which includes clinical breast exam every 6 months for 2 years (10/2023), with mammogram annually then  mammogram and exam annually until 5 years from diagnosis (10/26).    I advised her to continue with breast massage, demonstrated technique to be done daily.    Plan:  - continue follow up with Dr. Ventura   -Continue follow-up with Dr. Zapata.  - continue massage to surgical incisions  -bilateral diagnostic mammogram with tomosynthesis in 6 months at [facility] followed by exam  -She was instructed to call sooner with any questions, concerns or changes on BSE.    RAOUL Bustamante           CC:  RAOUL Torres PA

## 2023-04-19 ENCOUNTER — DOCUMENTATION (OUTPATIENT)
Dept: SURGERY | Facility: CLINIC | Age: 48
End: 2023-04-19
Payer: COMMERCIAL

## 2023-04-19 ENCOUNTER — TELEPHONE (OUTPATIENT)
Dept: SURGERY | Facility: CLINIC | Age: 48
End: 2023-04-19
Payer: COMMERCIAL

## 2023-04-19 DIAGNOSIS — R92.8 ABNORMAL FINDING ON BREAST IMAGING: Primary | ICD-10-CM

## 2023-05-08 ENCOUNTER — OFFICE VISIT (OUTPATIENT)
Dept: ONCOLOGY | Facility: CLINIC | Age: 48
End: 2023-05-08
Payer: COMMERCIAL

## 2023-05-08 ENCOUNTER — LAB (OUTPATIENT)
Dept: LAB | Facility: HOSPITAL | Age: 48
End: 2023-05-08
Payer: COMMERCIAL

## 2023-05-08 VITALS
HEIGHT: 65 IN | WEIGHT: 144.7 LBS | RESPIRATION RATE: 16 BRPM | SYSTOLIC BLOOD PRESSURE: 125 MMHG | DIASTOLIC BLOOD PRESSURE: 84 MMHG | OXYGEN SATURATION: 100 % | TEMPERATURE: 98 F | HEART RATE: 76 BPM | BODY MASS INDEX: 24.11 KG/M2

## 2023-05-08 DIAGNOSIS — K59.00 CONSTIPATION, UNSPECIFIED CONSTIPATION TYPE: ICD-10-CM

## 2023-05-08 DIAGNOSIS — R11.0 NAUSEA: ICD-10-CM

## 2023-05-08 DIAGNOSIS — D05.10 DUCTAL CARCINOMA IN SITU (DCIS) OF BREAST, UNSPECIFIED LATERALITY: Primary | ICD-10-CM

## 2023-05-08 DIAGNOSIS — N92.6 IRREGULAR PERIODS/MENSTRUAL CYCLES: ICD-10-CM

## 2023-05-08 LAB
ALBUMIN SERPL-MCNC: 4.1 G/DL (ref 3.5–5.2)
ALBUMIN/GLOB SERPL: 1.5 G/DL (ref 1.1–2.4)
ALP SERPL-CCNC: 62 U/L (ref 38–116)
ALT SERPL W P-5'-P-CCNC: 15 U/L (ref 0–33)
ANION GAP SERPL CALCULATED.3IONS-SCNC: 9.2 MMOL/L (ref 5–15)
AST SERPL-CCNC: 24 U/L (ref 0–32)
BASOPHILS # BLD AUTO: 0.08 10*3/MM3 (ref 0–0.2)
BASOPHILS NFR BLD AUTO: 1.1 % (ref 0–1.5)
BILIRUB SERPL-MCNC: 0.3 MG/DL (ref 0.2–1.2)
BUN SERPL-MCNC: 7 MG/DL (ref 6–20)
BUN/CREAT SERPL: 8.6 (ref 7.3–30)
CALCIUM SPEC-SCNC: 9.5 MG/DL (ref 8.5–10.2)
CHLORIDE SERPL-SCNC: 106 MMOL/L (ref 98–107)
CO2 SERPL-SCNC: 24.8 MMOL/L (ref 22–29)
CREAT SERPL-MCNC: 0.81 MG/DL (ref 0.6–1.1)
DEPRECATED RDW RBC AUTO: 42.8 FL (ref 37–54)
EGFRCR SERPLBLD CKD-EPI 2021: 88.6 ML/MIN/1.73
EOSINOPHIL # BLD AUTO: 0.16 10*3/MM3 (ref 0–0.4)
EOSINOPHIL NFR BLD AUTO: 2.1 % (ref 0.3–6.2)
ERYTHROCYTE [DISTWIDTH] IN BLOOD BY AUTOMATED COUNT: 12.6 % (ref 12.3–15.4)
GLOBULIN UR ELPH-MCNC: 2.8 GM/DL (ref 1.8–3.5)
GLUCOSE SERPL-MCNC: 92 MG/DL (ref 74–124)
HCT VFR BLD AUTO: 43.6 % (ref 34–46.6)
HGB BLD-MCNC: 14.9 G/DL (ref 12–15.9)
IMM GRANULOCYTES # BLD AUTO: 0.03 10*3/MM3 (ref 0–0.05)
IMM GRANULOCYTES NFR BLD AUTO: 0.4 % (ref 0–0.5)
LYMPHOCYTES # BLD AUTO: 2.79 10*3/MM3 (ref 0.7–3.1)
LYMPHOCYTES NFR BLD AUTO: 37.4 % (ref 19.6–45.3)
MCH RBC QN AUTO: 32.1 PG (ref 26.6–33)
MCHC RBC AUTO-ENTMCNC: 34.2 G/DL (ref 31.5–35.7)
MCV RBC AUTO: 94 FL (ref 79–97)
MONOCYTES # BLD AUTO: 0.5 10*3/MM3 (ref 0.1–0.9)
MONOCYTES NFR BLD AUTO: 6.7 % (ref 5–12)
NEUTROPHILS NFR BLD AUTO: 3.89 10*3/MM3 (ref 1.7–7)
NEUTROPHILS NFR BLD AUTO: 52.3 % (ref 42.7–76)
NRBC BLD AUTO-RTO: 0 /100 WBC (ref 0–0.2)
PLATELET # BLD AUTO: 215 10*3/MM3 (ref 140–450)
PMV BLD AUTO: 9.5 FL (ref 6–12)
POTASSIUM SERPL-SCNC: 4.7 MMOL/L (ref 3.5–4.7)
PROT SERPL-MCNC: 6.9 G/DL (ref 6.3–8)
RBC # BLD AUTO: 4.64 10*6/MM3 (ref 3.77–5.28)
SODIUM SERPL-SCNC: 140 MMOL/L (ref 134–145)
WBC NRBC COR # BLD: 7.45 10*3/MM3 (ref 3.4–10.8)

## 2023-05-08 PROCEDURE — 36415 COLL VENOUS BLD VENIPUNCTURE: CPT

## 2023-05-08 PROCEDURE — 99214 OFFICE O/P EST MOD 30 MIN: CPT | Performed by: INTERNAL MEDICINE

## 2023-05-08 PROCEDURE — 80053 COMPREHEN METABOLIC PANEL: CPT

## 2023-05-08 PROCEDURE — 85025 COMPLETE CBC W/AUTO DIFF WBC: CPT

## 2023-05-30 ENCOUNTER — OFFICE (AMBULATORY)
Dept: URBAN - METROPOLITAN AREA CLINIC 64 | Facility: CLINIC | Age: 48
End: 2023-05-30

## 2023-05-30 VITALS
WEIGHT: 144 LBS | HEART RATE: 84 BPM | DIASTOLIC BLOOD PRESSURE: 69 MMHG | HEIGHT: 65 IN | SYSTOLIC BLOOD PRESSURE: 99 MMHG

## 2023-05-30 DIAGNOSIS — R10.9 UNSPECIFIED ABDOMINAL PAIN: ICD-10-CM

## 2023-05-30 DIAGNOSIS — R19.4 CHANGE IN BOWEL HABIT: ICD-10-CM

## 2023-05-30 DIAGNOSIS — R11.0 NAUSEA: ICD-10-CM

## 2023-05-30 PROCEDURE — 99204 OFFICE O/P NEW MOD 45 MIN: CPT | Performed by: NURSE PRACTITIONER

## 2023-05-30 RX ORDER — PROMETHAZINE HYDROCHLORIDE 12.5 MG/1
50 TABLET ORAL
Qty: 60 | Refills: 3 | Status: ACTIVE
Start: 2023-05-30

## 2023-08-15 ENCOUNTER — OFFICE (AMBULATORY)
Dept: URBAN - METROPOLITAN AREA PATHOLOGY 4 | Facility: PATHOLOGY | Age: 48
End: 2023-08-15

## 2023-08-15 ENCOUNTER — ON CAMPUS - OUTPATIENT (AMBULATORY)
Dept: URBAN - METROPOLITAN AREA HOSPITAL 2 | Facility: HOSPITAL | Age: 48
End: 2023-08-15

## 2023-08-15 VITALS
RESPIRATION RATE: 15 BRPM | HEART RATE: 86 BPM | HEART RATE: 97 BPM | DIASTOLIC BLOOD PRESSURE: 87 MMHG | SYSTOLIC BLOOD PRESSURE: 117 MMHG | DIASTOLIC BLOOD PRESSURE: 83 MMHG | RESPIRATION RATE: 16 BRPM | OXYGEN SATURATION: 98 % | SYSTOLIC BLOOD PRESSURE: 114 MMHG | SYSTOLIC BLOOD PRESSURE: 119 MMHG | HEART RATE: 90 BPM | RESPIRATION RATE: 18 BRPM | RESPIRATION RATE: 14 BRPM | HEART RATE: 91 BPM | TEMPERATURE: 96 F | SYSTOLIC BLOOD PRESSURE: 126 MMHG | DIASTOLIC BLOOD PRESSURE: 82 MMHG | RESPIRATION RATE: 19 BRPM | OXYGEN SATURATION: 99 % | DIASTOLIC BLOOD PRESSURE: 77 MMHG | DIASTOLIC BLOOD PRESSURE: 75 MMHG | DIASTOLIC BLOOD PRESSURE: 80 MMHG | SYSTOLIC BLOOD PRESSURE: 105 MMHG | DIASTOLIC BLOOD PRESSURE: 84 MMHG | OXYGEN SATURATION: 100 % | WEIGHT: 142 LBS | SYSTOLIC BLOOD PRESSURE: 116 MMHG | DIASTOLIC BLOOD PRESSURE: 85 MMHG | SYSTOLIC BLOOD PRESSURE: 132 MMHG | HEART RATE: 82 BPM | SYSTOLIC BLOOD PRESSURE: 112 MMHG | HEART RATE: 89 BPM | RESPIRATION RATE: 20 BRPM | HEART RATE: 105 BPM | HEIGHT: 64 IN | HEART RATE: 84 BPM | SYSTOLIC BLOOD PRESSURE: 106 MMHG | RESPIRATION RATE: 17 BRPM | DIASTOLIC BLOOD PRESSURE: 78 MMHG

## 2023-08-15 DIAGNOSIS — R11.0 NAUSEA: ICD-10-CM

## 2023-08-15 DIAGNOSIS — R10.84 GENERALIZED ABDOMINAL PAIN: ICD-10-CM

## 2023-08-15 DIAGNOSIS — K31.89 OTHER DISEASES OF STOMACH AND DUODENUM: ICD-10-CM

## 2023-08-15 DIAGNOSIS — R19.4 CHANGE IN BOWEL HABIT: ICD-10-CM

## 2023-08-15 DIAGNOSIS — K31.9 DISEASE OF STOMACH AND DUODENUM, UNSPECIFIED: ICD-10-CM

## 2023-08-15 DIAGNOSIS — K20.80 OTHER ESOPHAGITIS WITHOUT BLEEDING: ICD-10-CM

## 2023-08-15 LAB
GI HISTOLOGY: A. SELECT: (no result)
GI HISTOLOGY: PDF REPORT: (no result)

## 2023-08-15 PROCEDURE — 88305 TISSUE EXAM BY PATHOLOGIST: CPT | Performed by: INTERNAL MEDICINE

## 2023-08-15 PROCEDURE — 43239 EGD BIOPSY SINGLE/MULTIPLE: CPT | Performed by: INTERNAL MEDICINE

## 2023-08-15 PROCEDURE — 45378 DIAGNOSTIC COLONOSCOPY: CPT | Performed by: INTERNAL MEDICINE

## 2023-08-28 ENCOUNTER — OFFICE (AMBULATORY)
Dept: URBAN - METROPOLITAN AREA CLINIC 64 | Facility: CLINIC | Age: 48
End: 2023-08-28

## 2023-08-28 VITALS
WEIGHT: 146 LBS | SYSTOLIC BLOOD PRESSURE: 98 MMHG | HEART RATE: 70 BPM | HEIGHT: 64 IN | DIASTOLIC BLOOD PRESSURE: 74 MMHG

## 2023-08-28 DIAGNOSIS — K59.00 CONSTIPATION, UNSPECIFIED: ICD-10-CM

## 2023-08-28 PROCEDURE — 99213 OFFICE O/P EST LOW 20 MIN: CPT | Performed by: NURSE PRACTITIONER

## 2023-08-28 RX ORDER — LUBIPROSTONE 24 UG/1
48 CAPSULE, GELATIN COATED ORAL
Qty: 60 | Refills: 5 | Status: COMPLETED
Start: 2023-08-28 | End: 2023-12-11

## 2023-10-31 ENCOUNTER — OFFICE VISIT (OUTPATIENT)
Dept: SURGERY | Facility: CLINIC | Age: 48
End: 2023-10-31
Payer: COMMERCIAL

## 2023-10-31 VITALS
DIASTOLIC BLOOD PRESSURE: 60 MMHG | HEIGHT: 65 IN | SYSTOLIC BLOOD PRESSURE: 110 MMHG | BODY MASS INDEX: 23.99 KG/M2 | WEIGHT: 144 LBS

## 2023-10-31 DIAGNOSIS — D05.11 DUCTAL CARCINOMA IN SITU (DCIS) OF RIGHT BREAST: Primary | ICD-10-CM

## 2023-10-31 DIAGNOSIS — D24.2 INTRADUCTAL PAPILLOMA OF LEFT BREAST: ICD-10-CM

## 2023-10-31 DIAGNOSIS — R92.8 ABNORMAL FINDING ON BREAST IMAGING: ICD-10-CM

## 2023-10-31 PROCEDURE — 99213 OFFICE O/P EST LOW 20 MIN: CPT | Performed by: NURSE PRACTITIONER

## 2023-10-31 RX ORDER — LUBIPROSTONE 24 UG/1
24 CAPSULE ORAL 2 TIMES DAILY WITH MEALS
COMMUNITY
Start: 2023-10-21

## 2023-10-31 NOTE — PROGRESS NOTES
[facility]     Referring Provider: No ref. provider found     Chief complaint:  Breast cancer follow up visit      HPI:   12/1/21:  Seen by Dr Stovall  Ms. 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 This was initially detected as an imaging abnormality on routine screening. Her work-up is detailed in the oncologic history below. Prior to the biopsy, she denies any breast lumps, pain, skin changes, or nipple discharge. She denies any prior history of abnormal mammograms or breast biopsies. She denies any family history of breast cancer. She has a family history of ovarian cancer in her maternal grandmother and pancreatic cancer in her maternal great grandmother.     12/17/21:  Seen by Dr Stovall  She underwent genetic testing which was negative for mutation. She then underwent bilateral MR-guided biopsies. This was negative on the right and on the left showed an intraductal papilloma (see report details below).     2/3/22:  Seen by Dr Stovall  She underwent right partial mastectomy, and left breast excisional biopsy with oncoplastic closure on 1/18/22. See surgery & pathology details below in oncologic history. She has been recovering well and has no complaints. She was joined today in clinic by her . She gave consent for him to be present during her examination and participate in the discussion.     5/11/22:  She returns today for follow up with some changes in her bilateral breasts since surgery that seem to be scarring.  She was seen by Dr Zapata in 4/22 at which time he felt they were c/w surgical changes, she will follow up with him in 7/22.     Given low risk of recurrence, she prefers not to take radiation.  She is tolerating tamoxifen well.    She was seen by Dr Goldman in 2/22: , I think her risk of local recurrence is extremely low based on the small size of dcis, no residual disease on  lumpectomy specimen therefore widely negative margins and strong estrogen receptor positivity.  According to the [facility] [place] nomogram for DCIS risk of recurrence, her risk of local recurrence at 10years is approximately 10%.  I explained that radiation would reduce the risk in half to approximately 5% at 10 years but no change in overall survival    Her last clinic visit with Dr Ventura was in 4/22:  She has started tamoxifen since February 2022, Tolerating tamoxifen very well    10/17/22:  She returns today for follow up with no breast changes, she is tolerating tamoxifen.  She has stable bursitis in the left shoulder.  Left shoulder pain with steroid injections.  She saw Dr Zapata in 7/22 with stable findings, follow up is scheduled.    Her last clinic visit with Dr Ventura was in 9/22:  Patient tolerating tamoxifen very well without any hot flashes.    Bilateral diagnostic mammogram with tomosynthesis on 10/4/22 was stable with asymmetry in the right breast for which short term follow up is recommended, BiRads 3.  (see full report below)    4/18/2023:  She returns today for follow up with no breast concerns or health changes.  She is tolerating tamoxifen well.  Recently she has been experiencing shoulder discomfort with steroid injections for left shoulder bursitis.    She has not been seen by medical oncology since 9/22.  Follow up is scheduled in the near future.    Right diagnostic mammogram on 4/4/23 was stable, BiRAds 3.  (see full report below)    10/31/23, Interval History:  She returns today for follow up with no breast concerns.  She was recently diagnosed with IBS with constipation after experiencing GI changes.  She takes medications with improvement in symptoms.  She continues to take tamoxifen, did not hold it in 5/23 as suggested by Dr Ventura.    Her last clinic visit with Dr Ventura was in 5/2023:  patient states she has been having nausea.  She went to her primary care physician  who put her on Prilosec.  Patient has been on tamoxifen for more than a year and has not had nausea so I doubt if that is the case.   We discussed about holding tamoxifen for 4 weeks to see if tamoxifen was the drug causing her nausea and she is willing to consider that.  I doubt if that is the case.  However we will give her a break for 4 weeks and restart if there is no change.     Bilateral diagnostic mammogram 10/17/23 was stable, BiRads 2.  (see full report below)      Oncology/Hematology History Overview Note   09/09/2020, Screening MMG with Jose ([facility]):  Heterogeneously dense. No dominant masses, suspicious microcalcifications or architectural distortions are identified in either breast. Stable exam without mammographic signs of malignancy.  BI-RADS 1: Negative.     Ductal carcinoma in situ (DCIS) of breast   9/12/2021 Initial Diagnosis    Ductal carcinoma in situ (DCIS) of right breast     9/13/2021 Imaging    Screening MMG with Jose ([facility]):  Heterogeneously dense. There is a group of calcifications within the upper outer quadrant of the right breast. No additional suspicious calcifications, architectural distortions or mass lesions identified.  BI-RADS 0: Incomplete.     9/24/2021 Imaging    Right Diagnostic MMG with Jose ([facility]):  As noted on the screening mammogram, there are calcifications at the upper aspect of the right breast. The calcifications demonstrate a somewhat amorphous appearance in the CC projection. On the 90 degree view, the majority of the calcifications demonstrate evidence for layering indicating milk of calcium as a benign entity. However, there is a small cluster of calcifications noted at the posterior upper outer aspect of the right breast. This cluster of calcifications does not demonstrate evidence for layering. The findings indicate a suspicious cluster of calcifications. This cluster of calcifications is marked on the  images. No suspicious masses or architectural distortions are observed.  BI-RADS 4: Suspicious.     10/28/2021 Biopsy    Right Breast, Stereotactic Biopsy ([facility]):    Right breast, stereotactic biopsy:  1. Ductal carcinoma in situ, cribriform type.  2. Nuclear grade 2 of 3.  3. Maximal dimension of DCIS is 0.3 cm.  4. Central comedo necrosis and calcifications are present.  5. No invasive carcinoma or lymphovascular invasion is identified.  6. The non-neoplastic breast shows fibrous mastopathy, apocrine hyperplasia and mild duct ectasia.    Comment - Two portions of tissue show ductal carcinoma in situ, cribriform type with central necrosis and calcifications.    ER+ (100%, strong)  AK+ (99%, strong)      [facility] PATHOLOGY REVIEW    1. Right Breast, Stereotactic Biopsy:               A. Ductal carcinoma in situ (DCIS):                            1. Low to intermediate grade cribriform DCIS with focal comedo type necrosis.                            2. DCIS focally present measuring up to at least 3 mm in greatest dimension.                            3. Microcalcification present within DCIS.                            4. Biomarkers ([facility]):                                         Estrogen Receptor: Positive (100%, Douglas 8/8)                                         Progesterone Receptor: Positive (98%, Douglas 7/8)                                         Ki67 ([facility] performed in House) =  5%               B. No invasive carcinoma identified.     Comment: We essentially concur with the pathologist's original submitted diagnosis of intermediate grade cribriform ductal carcinoma in situ with focal comedo type necrosis and microcalcification.  Immunostain for CK5/6 performed at the submitting institution is reviewed demonstrating an intact myoepithelial layer in areas of concern.  The submitted paraffin block will be used for one H&E recut plus immunostain for Ki-67 reported above.  All  slides prepared at the submitting institution in addition to their paraffin block will be returned.  Slides performed in house will be retained on file for comparison with the anticipated resection specimen.  This case was shared in internal consultation with Dr. Rogel, who concurred with the above diagnosis.     11/29/2021 Imaging    Bilateral Breast MRI ([facility] [place]):  RIGHT BREAST:    At 11:00, 5.2 cm posterior to the nipple, there is a 0.7 cm AP dimension, 2.2 cm transverse dimension, 1.7 cm craniocaudal dimension peripherally enhancing biopsy cavity/tract with a central focus of susceptibility from a biopsy clip marking the site of biopsy-proven malignancy. No suspicious mass or nonmass enhancement is identified at the biopsy site.  At 6:00 in the posterior right breast, 6.2 cm posterior to the nipple, there is a 0.9 cm AP dimension, 0.8 cm transverse dimension, 0.5 cm craniocaudal dimension focal area of clumped nonmass enhancement, which  is suspicious.  No suspicious enhancement is identified in the right nipple or chest wall.  The visualized axilla is within normal limits.    LEFT BREAST:    At 3:00 in the anterior left breast, 3.5 cm posterior to the nipple, there is a 1.0 cm AP dimension, 1.1 cm transverse dimension, 0.7 cm craniocaudal dimension enhancing mass with irregular margins, which is suspicious.  No suspicious enhancement is identified in the left nipple or chest wall.  The visualized axilla is within normal limits.   EXTRAMAMMARY FINDINGS:   There are no abnormally enlarged internal mammary chain lymph nodes on either side.  BI-RADS 4: Suspicious.     12/2/2021 Genetic Testing    [employer] Common Hereditary Cancers Panel (47 genes):    Negative     12/15/2021 Biopsy    Bilateral Breast, MR-Guided Biopsy ( [facility]):    1.  Right Breast, 6 o'clock, Stereotatic Biopsies for Non-Mass Enhancement:                A.  Benign breast parenchyma with fibroadenomatoid hyperplasia, columnar cell hyperplasia and          stromal fibrosis.   B.  No atypical hyperplasia, in situ nor invasive carcinoma identified.      2.  Left Breast, 3 o'clock, Stereotatic Biopsies for a Mass:                A.  Sclerotic intraductal papilloma.               B.  No atypical hyperplasia, in situ nor invasive carcinoma identified.       1/18/2022 Surgery    Left breast needle-localized excisional biopsy and right needle-localized partial mastectomy with oncoplastic closure    1.  Breast, Left, Lumpectomy (3 grams):                A.  Focal sclerosis suggesting possible minimal residual sclerotic papilloma identified adjacent to         barbell shaped clip and biopsy site change.   B.  No evidence of atypia, in situ nor infiltrating carcinoma.       2.  Breast, Right, Additional Superior, Lateral and Posterior Margins, Inked and Oriented Excision:                A.  Benign breast parenchyma.     3.  Breast, Right Additional Inferior and Medial Margins, Inked and oriented Excision:                A.  Benign breast parenchyma.      4.  Breast, Right Lumpectomy (3 grams):                A.  [place] tie clip with biopsy site change identified.                 B.  No evidence of atypia, in situ nor infiltrating carcinoma.       5.  Breast, Left, Reduction Mammoplasty (56 grams):                 A.  Benign skin, subcutaneous tissue and breast parenchyma with duct ectasia, apocrine metaplasia, columnar cell change, sclerosing adenosis and mild ductal hyperplasia.    B.  No evidence atypia, in situ nor infiltrating carcinoma.      6.  Breast, Right Reduction Mammoplasty, (38 grams).                   A.  Benign skin, subcutaneous tissue and breast parenchyma and focal mild duct ectasia, apocrine         metaplasia, and columnar cell change.      2/8/2022 Cancer Staged    Staging form: Breast, AJCC 8th Edition  - Clinical stage from 2/8/2022: Stage 0 (cTis (DCIS), cN0, cM0, ER+, DC+) - Signed by Sonal Ventura MD on 2/12/2022     10/4/2022 Imaging     Bilateral diagnostic mammogram with tomosynthesis at [facility]  Breasts are heterogeneously dense.  There are now noted to be lumpectomy changes in the mid upper central aspect of the right breast.  There are scattered punctate calcifications near the lumpectomy bed.  However, there are no suspicious clusters of calcifications.  There are now noted to be mastopexy changes in the right breast.  There is a 1.2 cm asymmetry superficial to the lumpectomy bed best seen in the MLO view.  This is likely postoperative changes from either the lumpectomy and/or mastopexy.  There are no suspicious mass lesions, suspicious calcifications or indirect signs of malignancy in the left breast.  There are now noted to be mastopexy changes in the left breast.  Impression:  Probably benign, interval follow-up suggested  BI-RADS 3  Recommendation:  Right diagnostic mammogram in 6 months.     4/4/2023 Imaging    Right diagnostic mammogram with tomosynthesis at [facility]  Breast is heterogeneously dense.  There are lumpectomy changes in the mid upper central aspect of the right breast.  There are scattered punctate calcifications near the lumpectomy bed.  However, there are no suspicious clusters of calcifications.  There are again noted to be mastopexy changes in the right breast.  There is again noted to be a 1.2 cm asymmetry superficial to the lumpectomy bed best seen in the MLO view.  This is likely postoperative changes from either the lumpectomy and/or the mastopexy.  Impression:  Probably benign, interval follow-up suggested  BI-RADS 3  Recommendation:  Bilateral diagnostic mammogram in 6 months     10/17/2023 Imaging    Bilateral diagnostic mammogram with tomosynthesis at [facility]  Findings:  The breasts are heterogeneously dense, which may obscure small masses.  As compared to the prior studies, there is been no change.  There are no suspicious mass lesions, suspicious calcifications or  indirect signs of malignancy in either breast.  There are stable lumpectomy changes in the right breast.  There is a stable microclip in the right breast.  Postsurgical changes are noted both breast.  Impression:  Benign findings BI-RADS 2  Recommendation:  Mammogram in 1 year         Review of Systems:  See interval history.      Medications:    Current Outpatient Medications:     lubiprostone (AMITIZA) 24 MCG capsule, Take 1 capsule by mouth 2 (Two) Times a Day With Meals., Disp: , Rfl:     acyclovir (ZOVIRAX) 400 MG tablet, TAKE 1 TABLET BY MOUTH TWICE DAILY FOR 5 DAYS AS NEEDED, Disp: , Rfl:     cetirizine (zyrTEC) 10 MG tablet, Daily As Needed for Allergies., Disp: , Rfl:     escitalopram (LEXAPRO) 20 MG tablet, Take 1 tablet by mouth Daily., Disp: , Rfl:     hydrocortisone 2.5 % ointment, APPLY TO EYELIDS EVERY NIGHT AT BEDTIME 3 TO 4 TIMES DAILY AS NEEDED FOR FLARES, Disp: , Rfl:     hydrOXYzine (ATARAX) 10 MG tablet, TK 1/2 TO 1 T PO Q 6 H PRA, Disp: , Rfl:     ibuprofen (ADVIL,MOTRIN) 800 MG tablet, Take 1 tablet by mouth Every 6 (Six) Hours As Needed. for pain, Disp: , Rfl:     meclizine (ANTIVERT) 25 MG tablet, TAKE 1 TABLET BY MOUTH THREE TIMES DAILY FOR UP TO 20 DAYS AS NEEDED FOR DIZZINESS, Disp: , Rfl:     Omega-3 Fatty Acids (FISH OIL PO), Take  by mouth., Disp: , Rfl:     ondansetron (ZOFRAN) 4 MG tablet, Take 1 tablet by mouth Every 8 (Eight) Hours As Needed. for nausea, Disp: , Rfl:     tamoxifen (NOLVADEX) 20 MG chemo tablet, TAKE 1 TABLET BY MOUTH DAILY, Disp: 90 tablet, Rfl: 1    tretinoin (RETIN-A) 0.025 % cream, APPLY PEA SIZED AMOUNT TOPICALLY TO FACE EVERY NIGHT, Disp: , Rfl:     triamcinolone (KENALOG) 0.1 % paste, As Needed., Disp: , Rfl:     Vitamin D, Cholecalciferol, (CHOLECALCIFEROL) 10 MCG (400 UNIT) tablet, Take 1 tablet by mouth Daily., Disp: , Rfl:     ZOLMitriptan (ZOMIG) 5 MG tablet, Take 1 tablet by mouth As Needed for Migraine., Disp: , Rfl:       Allergies   Allergen  "Reactions    Penicillins Nausea Only       Family History   Problem Relation Age of Onset    Ovarian cancer Maternal Grandmother 64    Pancreatic cancer Maternal Great-Grandmother     Breast cancer Mother     Malig Hyperthermia Neg Hx        PHYSICAL EXAMINATION:      /60 (BP Location: Left arm)   Ht 165.1 cm (65\")   Wt 65.3 kg (144 lb)   BMI 23.96 kg/m²       I reviewed physical exam, no changes noted    ECOG 0 - Asymptomatic  General: NAD, well appearing  Psych: a&o x3, normal mood and affect  Eyes: EOMI, no scleral icterus  ENMT: neck supple without masses or thyromegaly, mucous membranes moist  MSK: normal gait, normal ROM in bilateral shoulders  Lymph nodes: no cervical, supraclavicular or axillary lymphadenopathy  Breast: symmetric, moderate size, grade 2 ptosis  Right: Sp mastopexy with well-healed incisions. Flaps and NAC healthy.  No visible abnormalities on inspection while seated, with arms raised or hands on hips. No masses, skin changes, or nipple abnormalities.    Left: Sp mastopexy with well-healed incisions. There is an area over the lateral NAC with thickening and lighter pigment, but this appears viable.  No visible abnormalities on inspection while seated, with arms raised or hands on hips. No masses, skin changes, or nipple abnormalities.  Mild incision thickening c/w keloid inframmary fold.    Assessment:   1)  48 y.o. F with a diagnosis of a left breast intraductal papilloma and right breast ductal carcinoma in situ (DCIS)10/2021, low to intermediate grade, ER/TN+. She underwent right partial mastectomy and left breast excisional biopsy with oncoplastic closure on 1/18/22 with benign final pathology on the left and no residual disease found in the right breast (3 mm on core), pTis.    2)  Abnormal imaiigng right breast for which short term follow up is recommended, 10/2022, stable findings 10/2023, ending surveillance with routine follow up  1.2 cm asymmetry superficial to the " lumpectomy bed    Discussion:  Imaging findings were reviewed, a copy was given.  No further follow up of the right breast asymmetry is recommended, routine follow up recommended.    We discussed her follow up which includes clinical breast exam every 6 months for 2 years (10/2023), with mammogram annually then  mammogram and exam annually until 5 years from diagnosis (10/26).    I advised her to continue with breast massage, demonstrated technique to be done daily.    Plan:  - continue follow up with Dr. Ventura   - continue massage to surgical incisions  - screening mammogram with tomosynthesis in 12 months at [facility] followed by exam  -She was instructed to call sooner with any questions, concerns or changes on BSE.    RAOUL Bustamante           CC:  RAOUL Torres PA

## 2023-10-31 NOTE — LETTER
October 31, 2023     RAOUL Torres  [address] [place] [address]    Patient: Ara Diamond   YOB: 1975   Date of Visit: 10/31/2023     Dear RAOUL Torres:       Thank you for referring Ara Diamond to me for evaluation. Below are the relevant portions of my assessment and plan of care.    If you have questions, please do not hesitate to call me. I look forward to following Ara along with you.         Sincerely,        RAOUL Bustamante        CC: KRISTINE Pickett Mary Ellen, APRN  10/31/23 1346  Sign when Signing Visit  [facility]     Referring Provider: No ref. provider found     Chief complaint:  Breast cancer follow up visit      HPI:   12/1/21:  Seen by Dr Stovall  Ms. 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 This was initially detected as an imaging abnormality on routine screening. Her work-up is detailed in the oncologic history below. Prior to the biopsy, she denies any breast lumps, pain, skin changes, or nipple discharge. She denies any prior history of abnormal mammograms or breast biopsies. She denies any family history of breast cancer. She has a family history of ovarian cancer in her maternal grandmother and pancreatic cancer in her maternal great grandmother.     12/17/21:  Seen by Dr Stovall  She underwent genetic testing which was negative for mutation. She then underwent bilateral MR-guided biopsies. This was negative on the right and on the left showed an intraductal papilloma (see report details below).     2/3/22:  Seen by Dr Stovall  She underwent right partial mastectomy, and left breast excisional biopsy with oncoplastic closure on 1/18/22. See surgery & pathology details below in oncologic history. She has been recovering well and has no complaints. She was joined today in clinic by her . She gave  consent for him to be present during her examination and participate in the discussion.     5/11/22:  She returns today for follow up with some changes in her bilateral breasts since surgery that seem to be scarring.  She was seen by Dr Zapata in 4/22 at which time he felt they were c/w surgical changes, she will follow up with him in 7/22.     Given low risk of recurrence, she prefers not to take radiation.  She is tolerating tamoxifen well.    She was seen by Dr Goldman in 2/22: , I think her risk of local recurrence is extremely low based on the small size of dcis, no residual disease on lumpectomy specimen therefore widely negative margins and strong estrogen receptor positivity.  According to the [facility] [place] nomogram for DCIS risk of recurrence, her risk of local recurrence at 10years is approximately 10%.  I explained that radiation would reduce the risk in half to approximately 5% at 10 years but no change in overall survival    Her last clinic visit with Dr Ventura was in 4/22:  She has started tamoxifen since February 2022, Tolerating tamoxifen very well    10/17/22:  She returns today for follow up with no breast changes, she is tolerating tamoxifen.  She has stable bursitis in the left shoulder.  Left shoulder pain with steroid injections.  She saw Dr Zapata in 7/22 with stable findings, follow up is scheduled.    Her last clinic visit with Dr Ventura was in 9/22:  Patient tolerating tamoxifen very well without any hot flashes.    Bilateral diagnostic mammogram with tomosynthesis on 10/4/22 was stable with asymmetry in the right breast for which short term follow up is recommended, BiRads 3.  (see full report below)    4/18/2023:  She returns today for follow up with no breast concerns or health changes.  She is tolerating tamoxifen well.  Recently she has been experiencing shoulder discomfort with steroid injections for left shoulder bursitis.    She has not been seen by medical oncology  since 9/22.  Follow up is scheduled in the near future.    Right diagnostic mammogram on 4/4/23 was stable, BiRAds 3.  (see full report below)    10/31/23, Interval History:  She returns today for follow up with no breast concerns.  She was recently diagnosed with IBS with constipation after experiencing GI changes.  She takes medications with improvement in symptoms.  She continues to take tamoxifen, did not hold it in 5/23 as suggested by Dr Ventura.    Her last clinic visit with Dr Ventura was in 5/2023:  patient states she has been having nausea.  She went to her primary care physician who put her on Prilosec.  Patient has been on tamoxifen for more than a year and has not had nausea so I doubt if that is the case.   We discussed about holding tamoxifen for 4 weeks to see if tamoxifen was the drug causing her nausea and she is willing to consider that.  I doubt if that is the case.  However we will give her a break for 4 weeks and restart if there is no change.     Bilateral diagnostic mammogram 10/17/23 was stable, BiRads 2.  (see full report below)      Oncology/Hematology History Overview Note   09/09/2020, Screening MMG with Jose ([facility]):  Heterogeneously dense. No dominant masses, suspicious microcalcifications or architectural distortions are identified in either breast. Stable exam without mammographic signs of malignancy.  BI-RADS 1: Negative.     Ductal carcinoma in situ (DCIS) of breast   9/12/2021 Initial Diagnosis    Ductal carcinoma in situ (DCIS) of right breast     9/13/2021 Imaging    Screening MMG with Jose ([facility]):  Heterogeneously dense. There is a group of calcifications within the upper outer quadrant of the right breast. No additional suspicious calcifications, architectural distortions or mass lesions identified.  BI-RADS 0: Incomplete.     9/24/2021 Imaging    Right Diagnostic MMG with Jose ([facility]):  As noted on the screening  mammogram, there are calcifications at the upper aspect of the right breast. The calcifications demonstrate a somewhat amorphous appearance in the CC projection. On the 90 degree view, the majority of the calcifications demonstrate evidence for layering indicating milk of calcium as a benign entity. However, there is a small cluster of calcifications noted at the posterior upper outer aspect of the right breast. This cluster of calcifications does not demonstrate evidence for layering. The findings indicate a suspicious cluster of calcifications. This cluster of calcifications is marked on the images. No suspicious masses or architectural distortions are observed.  BI-RADS 4: Suspicious.     10/28/2021 Biopsy    Right Breast, Stereotactic Biopsy ([facility]):    Right breast, stereotactic biopsy:  1. Ductal carcinoma in situ, cribriform type.  2. Nuclear grade 2 of 3.  3. Maximal dimension of DCIS is 0.3 cm.  4. Central comedo necrosis and calcifications are present.  5. No invasive carcinoma or lymphovascular invasion is identified.  6. The non-neoplastic breast shows fibrous mastopathy, apocrine hyperplasia and mild duct ectasia.    Comment - Two portions of tissue show ductal carcinoma in situ, cribriform type with central necrosis and calcifications.    ER+ (100%, strong)  CT+ (99%, strong)      [facility] PATHOLOGY REVIEW    1. Right Breast, Stereotactic Biopsy:               A. Ductal carcinoma in situ (DCIS):                            1. Low to intermediate grade cribriform DCIS with focal comedo type necrosis.                            2. DCIS focally present measuring up to at least 3 mm in greatest dimension.                            3. Microcalcification present within DCIS.                            4. Biomarkers ([facility]):                                         Estrogen Receptor: Positive (100%, Douglas 8/8)                                         Progesterone Receptor:  Positive (98%, Douglas 7/8)                                         Ki67 ([facility] performed in House) =  5%               B. No invasive carcinoma identified.     Comment: We essentially concur with the pathologist's original submitted diagnosis of intermediate grade cribriform ductal carcinoma in situ with focal comedo type necrosis and microcalcification.  Immunostain for CK5/6 performed at the submitting institution is reviewed demonstrating an intact myoepithelial layer in areas of concern.  The submitted paraffin block will be used for one H&E recut plus immunostain for Ki-67 reported above.  All slides prepared at the submitting institution in addition to their paraffin block will be returned.  Slides performed in house will be retained on file for comparison with the anticipated resection specimen.  This case was shared in internal consultation with Dr. Rogel, who concurred with the above diagnosis.     11/29/2021 Imaging    Bilateral Breast MRI ([facility]):  RIGHT BREAST:    At 11:00, 5.2 cm posterior to the nipple, there is a 0.7 cm AP dimension, 2.2 cm transverse dimension, 1.7 cm craniocaudal dimension peripherally enhancing biopsy cavity/tract with a central focus of susceptibility from a biopsy clip marking the site of biopsy-proven malignancy. No suspicious mass or nonmass enhancement is identified at the biopsy site.  At 6:00 in the posterior right breast, 6.2 cm posterior to the nipple, there is a 0.9 cm AP dimension, 0.8 cm transverse dimension, 0.5 cm craniocaudal dimension focal area of clumped nonmass enhancement, which  is suspicious.  No suspicious enhancement is identified in the right nipple or chest wall.  The visualized axilla is within normal limits.    LEFT BREAST:    At 3:00 in the anterior left breast, 3.5 cm posterior to the nipple, there is a 1.0 cm AP dimension, 1.1 cm transverse dimension, 0.7 cm craniocaudal dimension enhancing mass with irregular margins, which is suspicious.  No  suspicious enhancement is identified in the left nipple or chest wall.  The visualized axilla is within normal limits.   EXTRAMAMMARY FINDINGS:   There are no abnormally enlarged internal mammary chain lymph nodes on either side.  BI-RADS 4: Suspicious.     12/2/2021 Genetic Testing    [employer] Common Hereditary Cancers Panel (47 genes):    Negative     12/15/2021 Biopsy    Bilateral Breast, MR-Guided Biopsy ([facility]):    1.  Right Breast, 6 o'clock, Stereotatic Biopsies for Non-Mass Enhancement:                A.  Benign breast parenchyma with fibroadenomatoid hyperplasia, columnar cell hyperplasia and         stromal fibrosis.   B.  No atypical hyperplasia, in situ nor invasive carcinoma identified.      2.  Left Breast, 3 o'clock, Stereotatic Biopsies for a Mass:                A.  Sclerotic intraductal papilloma.               B.  No atypical hyperplasia, in situ nor invasive carcinoma identified.       1/18/2022 Surgery    Left breast needle-localized excisional biopsy and right needle-localized partial mastectomy with oncoplastic closure    1.  Breast, Left, Lumpectomy (3 grams):                A.  Focal sclerosis suggesting possible minimal residual sclerotic papilloma identified adjacent to         barbell shaped clip and biopsy site change.   B.  No evidence of atypia, in situ nor infiltrating carcinoma.       2.  Breast, Right, Additional Superior, Lateral and Posterior Margins, Inked and Oriented Excision:                A.  Benign breast parenchyma.     3.  Breast, Right Additional Inferior and Medial Margins, Inked and oriented Excision:                A.  Benign breast parenchyma.      4.  Breast, Right Lumpectomy (3 grams):                A.  [place] tie clip with biopsy site change identified.                 B.  No evidence of atypia, in situ nor infiltrating carcinoma.       5.  Breast, Left, Reduction Mammoplasty (56 grams):                 A.  Benign skin, subcutaneous tissue and breast parenchyma with  duct ectasia, apocrine metaplasia, columnar cell change, sclerosing adenosis and mild ductal hyperplasia.    B.  No evidence atypia, in situ nor infiltrating carcinoma.      6.  Breast, Right Reduction Mammoplasty, (38 grams).                   A.  Benign skin, subcutaneous tissue and breast parenchyma and focal mild duct ectasia, apocrine         metaplasia, and columnar cell change.      2/8/2022 Cancer Staged    Staging form: Breast, AJCC 8th Edition  - Clinical stage from 2/8/2022: Stage 0 (cTis (DCIS), cN0, cM0, ER+, CA+) - Signed by Sonal Ventura MD on 2/12/2022     10/4/2022 Imaging    Bilateral diagnostic mammogram with tomosynthesis at [facility]  Breasts are heterogeneously dense.  There are now noted to be lumpectomy changes in the mid upper central aspect of the right breast.  There are scattered punctate calcifications near the lumpectomy bed.  However, there are no suspicious clusters of calcifications.  There are now noted to be mastopexy changes in the right breast.  There is a 1.2 cm asymmetry superficial to the lumpectomy bed best seen in the MLO view.  This is likely postoperative changes from either the lumpectomy and/or mastopexy.  There are no suspicious mass lesions, suspicious calcifications or indirect signs of malignancy in the left breast.  There are now noted to be mastopexy changes in the left breast.  Impression:  Probably benign, interval follow-up suggested  BI-RADS 3  Recommendation:  Right diagnostic mammogram in 6 months.     4/4/2023 Imaging    Right diagnostic mammogram with tomosynthesis at [facility]  Breast is heterogeneously dense.  There are lumpectomy changes in the mid upper central aspect of the right breast.  There are scattered punctate calcifications near the lumpectomy bed.  However, there are no suspicious clusters of calcifications.  There are again noted to be mastopexy changes in the right breast.  There is again noted to be a 1.2 cm  asymmetry superficial to the lumpectomy bed best seen in the MLO view.  This is likely postoperative changes from either the lumpectomy and/or the mastopexy.  Impression:  Probably benign, interval follow-up suggested  BI-RADS 3  Recommendation:  Bilateral diagnostic mammogram in 6 months     10/17/2023 Imaging    Bilateral diagnostic mammogram with tomosynthesis at [facility]  Findings:  The breasts are heterogeneously dense, which may obscure small masses.  As compared to the prior studies, there is been no change.  There are no suspicious mass lesions, suspicious calcifications or indirect signs of malignancy in either breast.  There are stable lumpectomy changes in the right breast.  There is a stable microclip in the right breast.  Postsurgical changes are noted both breast.  Impression:  Benign findings BI-RADS 2  Recommendation:  Mammogram in 1 year         Review of Systems:  See interval history.      Medications:    Current Outpatient Medications:   •  lubiprostone (AMITIZA) 24 MCG capsule, Take 1 capsule by mouth 2 (Two) Times a Day With Meals., Disp: , Rfl:   •  acyclovir (ZOVIRAX) 400 MG tablet, TAKE 1 TABLET BY MOUTH TWICE DAILY FOR 5 DAYS AS NEEDED, Disp: , Rfl:   •  cetirizine (zyrTEC) 10 MG tablet, Daily As Needed for Allergies., Disp: , Rfl:   •  escitalopram (LEXAPRO) 20 MG tablet, Take 1 tablet by mouth Daily., Disp: , Rfl:   •  hydrocortisone 2.5 % ointment, APPLY TO EYELIDS EVERY NIGHT AT BEDTIME 3 TO 4 TIMES DAILY AS NEEDED FOR FLARES, Disp: , Rfl:   •  hydrOXYzine (ATARAX) 10 MG tablet, TK 1/2 TO 1 T PO Q 6 H PRA, Disp: , Rfl:   •  ibuprofen (ADVIL,MOTRIN) 800 MG tablet, Take 1 tablet by mouth Every 6 (Six) Hours As Needed. for pain, Disp: , Rfl:   •  meclizine (ANTIVERT) 25 MG tablet, TAKE 1 TABLET BY MOUTH THREE TIMES DAILY FOR UP TO 20 DAYS AS NEEDED FOR DIZZINESS, Disp: , Rfl:   •  Omega-3 Fatty Acids (FISH OIL PO), Take  by mouth., Disp: , Rfl:   •  ondansetron (ZOFRAN) 4 MG  "tablet, Take 1 tablet by mouth Every 8 (Eight) Hours As Needed. for nausea, Disp: , Rfl:   •  tamoxifen (NOLVADEX) 20 MG chemo tablet, TAKE 1 TABLET BY MOUTH DAILY, Disp: 90 tablet, Rfl: 1  •  tretinoin (RETIN-A) 0.025 % cream, APPLY PEA SIZED AMOUNT TOPICALLY TO FACE EVERY NIGHT, Disp: , Rfl:   •  triamcinolone (KENALOG) 0.1 % paste, As Needed., Disp: , Rfl:   •  Vitamin D, Cholecalciferol, (CHOLECALCIFEROL) 10 MCG (400 UNIT) tablet, Take 1 tablet by mouth Daily., Disp: , Rfl:   •  ZOLMitriptan (ZOMIG) 5 MG tablet, Take 1 tablet by mouth As Needed for Migraine., Disp: , Rfl:       Allergies   Allergen Reactions   • Penicillins Nausea Only       Family History   Problem Relation Age of Onset   • Ovarian cancer Maternal Grandmother 64   • Pancreatic cancer Maternal Great-Grandmother    • Breast cancer Mother    • Malig Hyperthermia Neg Hx        PHYSICAL EXAMINATION:      /60 (BP Location: Left arm)   Ht 165.1 cm (65\")   Wt 65.3 kg (144 lb)   BMI 23.96 kg/m²       I reviewed physical exam, no changes noted    ECOG 0 - Asymptomatic  General: NAD, well appearing  Psych: a&o x3, normal mood and affect  Eyes: EOMI, no scleral icterus  ENMT: neck supple without masses or thyromegaly, mucous membranes moist  MSK: normal gait, normal ROM in bilateral shoulders  Lymph nodes: no cervical, supraclavicular or axillary lymphadenopathy  Breast: symmetric, moderate size, grade 2 ptosis  Right: Sp mastopexy with well-healed incisions. Flaps and NAC healthy.  No visible abnormalities on inspection while seated, with arms raised or hands on hips. No masses, skin changes, or nipple abnormalities.    Left: Sp mastopexy with well-healed incisions. There is an area over the lateral NAC with thickening and lighter pigment, but this appears viable.  No visible abnormalities on inspection while seated, with arms raised or hands on hips. No masses, skin changes, or nipple abnormalities.  Mild incision thickening c/w keloid " inframmary fold.    Assessment:   1)  48 y.o. F with a diagnosis of a left breast intraductal papilloma and right breast ductal carcinoma in situ (DCIS)10/2021, low to intermediate grade, ER/ME+. She underwent right partial mastectomy and left breast excisional biopsy with oncoplastic closure on 1/18/22 with benign final pathology on the left and no residual disease found in the right breast (3 mm on core), pTis.    2)  Abnormal imaiigng right breast for which short term follow up is recommended, 10/2022, stable findings 10/2023, ending surveillance with routine follow up  1.2 cm asymmetry superficial to the lumpectomy bed    Discussion:  Imaging findings were reviewed, a copy was given.  No further follow up of the right breast asymmetry is recommended, routine follow up recommended.    We discussed her follow up which includes clinical breast exam every 6 months for 2 years (10/2023), with mammogram annually then  mammogram and exam annually until 5 years from diagnosis (10/26).    I advised her to continue with breast massage, demonstrated technique to be done daily.    Plan:  - continue follow up with Dr. Ventura   - continue massage to surgical incisions  - screening mammogram with tomosynthesis in 12 months at [facility] followed by exam  -She was instructed to call sooner with any questions, concerns or changes on BSE.    RAOUL Bustamante           CC:  RAOUL Torres PA

## 2023-11-01 ENCOUNTER — TELEPHONE (OUTPATIENT)
Dept: SURGERY | Facility: CLINIC | Age: 48
End: 2023-11-01
Payer: COMMERCIAL

## 2023-11-27 ENCOUNTER — LAB (OUTPATIENT)
Dept: LAB | Facility: HOSPITAL | Age: 48
End: 2023-11-27
Payer: COMMERCIAL

## 2023-11-27 ENCOUNTER — OFFICE VISIT (OUTPATIENT)
Dept: ONCOLOGY | Facility: CLINIC | Age: 48
End: 2023-11-27
Payer: COMMERCIAL

## 2023-11-27 VITALS
DIASTOLIC BLOOD PRESSURE: 85 MMHG | RESPIRATION RATE: 16 BRPM | BODY MASS INDEX: 24.21 KG/M2 | SYSTOLIC BLOOD PRESSURE: 127 MMHG | HEART RATE: 75 BPM | TEMPERATURE: 98 F | WEIGHT: 145.3 LBS | OXYGEN SATURATION: 98 % | HEIGHT: 65 IN

## 2023-11-27 DIAGNOSIS — D05.10 DUCTAL CARCINOMA IN SITU (DCIS) OF BREAST, UNSPECIFIED LATERALITY: Primary | ICD-10-CM

## 2023-11-27 DIAGNOSIS — D05.10 DUCTAL CARCINOMA IN SITU (DCIS) OF BREAST, UNSPECIFIED LATERALITY: ICD-10-CM

## 2023-11-27 DIAGNOSIS — R11.0 NAUSEA: ICD-10-CM

## 2023-11-27 DIAGNOSIS — K59.00 CONSTIPATION, UNSPECIFIED CONSTIPATION TYPE: ICD-10-CM

## 2023-11-27 DIAGNOSIS — N92.6 IRREGULAR PERIODS/MENSTRUAL CYCLES: ICD-10-CM

## 2023-11-27 LAB
ALBUMIN SERPL-MCNC: 3.9 G/DL (ref 3.5–5.2)
ALBUMIN/GLOB SERPL: 1.3 G/DL
ALP SERPL-CCNC: 70 U/L (ref 39–117)
ALT SERPL W P-5'-P-CCNC: 13 U/L (ref 1–33)
ANION GAP SERPL CALCULATED.3IONS-SCNC: 9.1 MMOL/L (ref 5–15)
AST SERPL-CCNC: 26 U/L (ref 1–32)
BASOPHILS # BLD AUTO: 0.08 10*3/MM3 (ref 0–0.2)
BASOPHILS NFR BLD AUTO: 1.5 % (ref 0–1.5)
BILIRUB SERPL-MCNC: 0.2 MG/DL (ref 0–1.2)
BUN SERPL-MCNC: 9 MG/DL (ref 6–20)
BUN/CREAT SERPL: 12.9 (ref 7–25)
CALCIUM SPEC-SCNC: 8.7 MG/DL (ref 8.6–10.5)
CHLORIDE SERPL-SCNC: 106 MMOL/L (ref 98–107)
CO2 SERPL-SCNC: 26.9 MMOL/L (ref 22–29)
CREAT SERPL-MCNC: 0.7 MG/DL (ref 0.6–1.1)
DEPRECATED RDW RBC AUTO: 43 FL (ref 37–54)
EGFRCR SERPLBLD CKD-EPI 2021: 105.5 ML/MIN/1.73
EOSINOPHIL # BLD AUTO: 0.12 10*3/MM3 (ref 0–0.4)
EOSINOPHIL NFR BLD AUTO: 2.2 % (ref 0.3–6.2)
ERYTHROCYTE [DISTWIDTH] IN BLOOD BY AUTOMATED COUNT: 12.6 % (ref 12.3–15.4)
GLOBULIN UR ELPH-MCNC: 2.9 GM/DL
GLUCOSE SERPL-MCNC: 83 MG/DL (ref 65–99)
HCT VFR BLD AUTO: 43.5 % (ref 34–46.6)
HGB BLD-MCNC: 14.8 G/DL (ref 12–15.9)
IMM GRANULOCYTES # BLD AUTO: 0.01 10*3/MM3 (ref 0–0.05)
IMM GRANULOCYTES NFR BLD AUTO: 0.2 % (ref 0–0.5)
LYMPHOCYTES # BLD AUTO: 2.29 10*3/MM3 (ref 0.7–3.1)
LYMPHOCYTES NFR BLD AUTO: 42.4 % (ref 19.6–45.3)
MCH RBC QN AUTO: 31.8 PG (ref 26.6–33)
MCHC RBC AUTO-ENTMCNC: 34 G/DL (ref 31.5–35.7)
MCV RBC AUTO: 93.3 FL (ref 79–97)
MONOCYTES # BLD AUTO: 0.36 10*3/MM3 (ref 0.1–0.9)
MONOCYTES NFR BLD AUTO: 6.7 % (ref 5–12)
NEUTROPHILS NFR BLD AUTO: 2.54 10*3/MM3 (ref 1.7–7)
NEUTROPHILS NFR BLD AUTO: 47 % (ref 42.7–76)
NRBC BLD AUTO-RTO: 0 /100 WBC (ref 0–0.2)
PLATELET # BLD AUTO: 240 10*3/MM3 (ref 140–450)
PMV BLD AUTO: 9.9 FL (ref 6–12)
POTASSIUM SERPL-SCNC: 4.1 MMOL/L (ref 3.5–5.2)
PROT SERPL-MCNC: 6.8 G/DL (ref 6–8.5)
RBC # BLD AUTO: 4.66 10*6/MM3 (ref 3.77–5.28)
SODIUM SERPL-SCNC: 142 MMOL/L (ref 136–145)
WBC NRBC COR # BLD AUTO: 5.4 10*3/MM3 (ref 3.4–10.8)

## 2023-11-27 PROCEDURE — 36415 COLL VENOUS BLD VENIPUNCTURE: CPT

## 2023-11-27 PROCEDURE — 80053 COMPREHEN METABOLIC PANEL: CPT

## 2023-11-27 PROCEDURE — 99214 OFFICE O/P EST MOD 30 MIN: CPT | Performed by: INTERNAL MEDICINE

## 2023-11-27 PROCEDURE — 85025 COMPLETE CBC W/AUTO DIFF WBC: CPT

## 2023-11-27 RX ORDER — FLUTICASONE PROPIONATE 50 MCG
SPRAY, SUSPENSION (ML) NASAL
COMMUNITY
Start: 2023-11-09

## 2023-11-27 RX ORDER — PROMETHAZINE HYDROCHLORIDE 25 MG/1
1 TABLET ORAL EVERY 6 HOURS PRN
COMMUNITY
Start: 2023-11-07

## 2023-11-27 NOTE — PROGRESS NOTES
Subjective     REASON FOR follow-up:     1. 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  Final pathology on the left was benign and there was no residual disease found in the right breast at surgery.    Started tamoxifen February 12, 2022      History of Present Illness     Patient is a 47-year-old female with a history of DCIS currently on tamoxifen and tolerating well.  She denies any complaints.  She has not lost weight.  She has got a good appetite.  She denies any hot flashes.  She did go to Dr. Trinh, Dr[facility]be[facility] in [place] and had ultrasound which was negative.  She has had irregular menstrual periods likely because she is getting into menopause.  Her gynecologist was not concerned.  We will try to get records from her gynecologist in [place].    Interval history patient states she has been having nausea.  She went to her primary care physician who put her on Prilosec.  Patient has been on tamoxifen for more than a year and has not had nausea so I doubt if that is the case.  Patient also is complaining of constipation.  She had a colonoscopy at age 43 and it had shown precancerous cells and she was due to have a repeat colonoscopy in 5 years.  Patient has appointment with GI on May 30.  She will discuss with him about getting a colonoscopy and EGD.    We discussed about holding tamoxifen for 4 weeks to see if tamoxifen was the drug causing her nausea and she is willing to consider that.  I doubt if that is the case.  However we will give her a break for 4 weeks and restart if there is no change.  Patient had a mammogram April 4, 2023 right diagnostic mammogram the breast is heterogeneously dense.  There is lumpectomy changes in the mid upper central aspect of the right breast.  There are  scattered calcifications near the lumpectomy bed.  However there are no suspicious clusters of calcifications.  There are again noted to be mastopexy changes in the right breast.  There is a noted a 1.2 cm asymmetry superficial to the lumpectomy bed best seen on the MLO view.  This is likely postoperative change from either the lumpectomy and or the mastopexy.  Probably benign interval follow-up is suggested.  Bilateral diagnostic mammogram is suggested in 6 months.    November 27, 2023: Patient states that she had irregular menstrual period's likely because she was becoming perimenopausal.  Also she went to a midwife who did not do an ultrasound of the uterus.  We discussed that given that she is on tamoxifen best option would be to refer her to gynecologist and obtain an ultrasound of the uterus since we want to make sure there is no underlying abnormality in the uterus and to rule out uterine cancer.  However we had asked her to hold tamoxifen for 4 weeks when we saw her last 6 months ago because of the nausea and abdominal discomfort and diarrhea.  However patient did not stop it because when she did go to her GI doctor they found that she had IBS and currently is under control.  So she continued her tamoxifen.  She saw Herb recently who asked her to hold tamoxifen and see me.  But patient did not hold it as she has no symptoms related to tamoxifen like hot flashes.  No swelling of the feet no chest pain or shortness of breath.  She had the last menstrual period was December 2022 and given that it was irregular we had asked her to see her GYN.  We discussed her that she needs to see OB/GYN or midwife.    Oncologic history:  Patient is a 47-year-old female who had been recently diagnosed with right breast ductal carcinoma in situ.  This was detected on a routine screening mammogram.  There is no family history of breast cancer but there is family history of ovarian cancer in maternal grandmother and pancreatic  cancer in her maternal great-grandmother.  Patient is referred here for further options of treatment.    Details are as follows    September 13, 2021: Screening mammogram    There is a group of calcifications within the upper outer quadrant of the right breast, no other calcifications are clear architectural distortion or mass is identified    September 24, 2021: Right breast diagnostic mammogram and ultrasound    Calcifications in the upper aspect of the right breast are present.  The calcifications demonstrate amorphous appearance on the CC projection.  On the 90 degree view the majority of the calcifications show evidence of layering indicating milk or calcium as a benign entity.  However there is a small cluster of calcification noted in the posterior upper outer aspect of the right breast.  The cluster of calcifications does not demonstrate evidence for layering.  These indicate a suspicious cluster of calcifications.  No suspicious masses or architectural distortions are seen    October 20, 2021: Right breast stereotactic biopsy done at [facility]  Right breast stereotactic biopsy  1.  Ductal carcinoma in situ, cribriform type  2.  Nuclear grade 2-3  Maximum dimension of DCIS is 0.3 cm  4.  Central comedonecrosis and calcifications are present  5.  No invasive carcinoma or lymphovascular space invasion is identified.  6.  The nonneoplastic breast shows fibrous mastopathy, apocrine hyperplasia and mild duct ectasia.    Comment-2 portions of tissue showed ductal carcinoma in situ, cribriform type with central necrosis and calcifications  ER: 100%  TN: 99%  Ki-67 5%    November 29, 2021: Bilateral breast MRI  IMPRESSION AND RECOMMENDATION:     1.  A 2.2 cm biopsy cavity/tract at 11:00 in the right breast represents  the site of biopsy-proven malignancy. Surgical management is recommended  with preoperative localization targeting the biopsy clip and residual  calcifications noted lateral to the biopsy  clip on the post biopsy  mammogram. These have been marked on the post biopsy mammogram.  2.  Suspicious 0.9 cm nonmass enhancement at 6:00 in the right breast.  Recommend further evaluation with MRI guided core needle biopsy.  3.  Suspicious 1.1 cm enhancing mass at 3:00 in the left breast.  Recommend further evaluation with MRI guided core needle biopsy.       12/2/2021: Genetic testing  INVITAE genetic test 47 genes negative    December 15, 2021: Bilateral breast MR guided biopsy  1.  Right breast 6 o'clock position, stereotactic biopsy for non-mass enhancement  A.  Benign breast parenchyma with fibroadenomatoid hyperplasia, columnar cell hyperplasia and stromal fibrosis    B.  No atypical hyperplasia, in situ or invasive carcinoma identified    2.  Left breast 3 o'clock position stereotactic biopsy for a mass  A.  Still sclerotic intraductal papilloma  B.  No atypical hyperplasia, in situ or invasive carcinoma identified    January 18, 2022: Left breast needle localized excisional biopsy and right needle localized partial mastectomy with oncoplastic closure    Final Diagnosis   1.  Breast, Left, Lumpectomy (3 grams):                A.  Focal sclerosis suggesting possible minimal residual sclerotic papilloma identified adjacent to         barbell shaped clip and biopsy site change.   B.  No evidence of atypia, in situ nor infiltrating carcinoma.       2.  Breast, Right, Additional Superior, Lateral and Posterior Margins, Inked and Oriented Excision:                A.  Benign breast parenchyma.     3.  Breast, Right Additional Inferior and Medial Margins, Inked and oriented Excision:                A.  Benign breast parenchyma.      4.  Breast, Right Lumpectomy (3 grams):                A.  [place] tie clip with biopsy site change identified.                 B.  No evidence of atypia, in situ nor infiltrating carcinoma.       5.  Breast, Left, Reduction Mammoplasty (56 grams):                 A.  Benign skin,  subcutaneous tissue and breast parenchyma with duct ectasia, apocrine metaplasia,         columnar cell change, sclerosing adenosis and mild ductal hyperplasia.    B.  No evidence atypia, in situ nor infiltrating carcinoma.      6.  Breast, Right Reduction Mammoplasty, (38 grams).                   A.  Benign skin, subcutaneous tissue and breast parenchyma and focal mild duct ectasia, apocrine         metaplasia, and columnar cell change.      mec/brb     Patient is here for further evaluation and treatment.  Patient has appointment with Dr. Beatris Ugarte on February 9, 2022    Patient seen by Dr. Beatris Ugarte and she elected not to proceed with radiation    February 12, 2022: Started tamoxifen      Past Medical History:   Diagnosis Date    Anxiety     Breast cancer     Depression     Ductal carcinoma in situ (DCIS) of right breast 2021    Migraine     Seasonal allergies         Past Surgical History:   Procedure Laterality Date    ABDOMINOPLASTY  2016    BREAST LUMPECTOMY Right 1/18/2022    Procedure: Right needle-localized partial mastectomy,Left breast needle-localized excisional biopsy;  Surgeon: Enedina Stovall MD;  Location: [facility];  Service: General;  Laterality: Right;    BREAST SURGERY Bilateral 1/18/2022    Procedure: RIGHT BREAST ONCOPLASTIC RECONSTRUCTION WITH SYMMETRY PROCEDURE;  Surgeon: Nestor Zapata MD;  Location: [facility];  Service: Plastics;  Laterality: Bilateral;    COLONOSCOPY      LAPAROSCOPIC CHOLECYSTECTOMY      OTHER SURGICAL HISTORY      Arm Surgery (Skin Removal)        Current Outpatient Medications on File Prior to Visit   Medication Sig Dispense Refill    acyclovir (ZOVIRAX) 400 MG tablet TAKE 1 TABLET BY MOUTH TWICE DAILY FOR 5 DAYS AS NEEDED      cetirizine (zyrTEC) 10 MG tablet Daily As Needed for Allergies.      escitalopram (LEXAPRO) 20 MG tablet Take 1 tablet by mouth Daily.      fluticasone (FLONASE) 50 MCG/ACT nasal spray SHAKE LIQUID AND USE 1 SPRAY  IN EACH NOSTRIL DAILY FOR 4 DAYS      hydrocortisone 2.5 % ointment APPLY TO EYELIDS EVERY NIGHT AT BEDTIME 3 TO 4 TIMES DAILY AS NEEDED FOR FLARES      hydrOXYzine (ATARAX) 10 MG tablet TK 1/2 TO 1 T PO Q 6 H PRA      ibuprofen (ADVIL,MOTRIN) 800 MG tablet Take 1 tablet by mouth Every 6 (Six) Hours As Needed. for pain      lubiprostone (AMITIZA) 24 MCG capsule Take 1 capsule by mouth 2 (Two) Times a Day With Meals.      meclizine (ANTIVERT) 25 MG tablet TAKE 1 TABLET BY MOUTH THREE TIMES DAILY FOR UP TO 20 DAYS AS NEEDED FOR DIZZINESS      Omega-3 Fatty Acids (FISH OIL PO) Take  by mouth.      ondansetron (ZOFRAN) 4 MG tablet Take 1 tablet by mouth Every 8 (Eight) Hours As Needed. for nausea      promethazine (PHENERGAN) 25 MG tablet Take 1 tablet by mouth Every 6 (Six) Hours As Needed.      tamoxifen (NOLVADEX) 20 MG chemo tablet TAKE 1 TABLET BY MOUTH DAILY 90 tablet 1    tretinoin (RETIN-A) 0.025 % cream APPLY PEA SIZED AMOUNT TOPICALLY TO FACE EVERY NIGHT      triamcinolone (KENALOG) 0.1 % paste As Needed.      Vitamin D, Cholecalciferol, (CHOLECALCIFEROL) 10 MCG (400 UNIT) tablet Take 1 tablet by mouth Daily.      ZOLMitriptan (ZOMIG) 5 MG tablet Take 1 tablet by mouth As Needed for Migraine.       No current facility-administered medications on file prior to visit.        ALLERGIES:    Allergies   Allergen Reactions    Penicillins Nausea Only        Social History     Socioeconomic History    Marital status:      Spouse name: Addison    Number of children: 2   Tobacco Use    Smoking status: Former     Packs/day: 1.00     Years: 15.00     Additional pack years: 0.00     Total pack years: 15.00     Types: Cigarettes     Quit date:      Years since quittin.9    Smokeless tobacco: Never   Vaping Use    Vaping Use: Never used   Substance and Sexual Activity    Alcohol use: No    Drug use: No    Sexual activity: Defer        Family History   Problem Relation Age of Onset    Ovarian cancer Maternal  "Grandmother 64    Pancreatic cancer Maternal Great-Grandmother     Breast cancer Mother     Malig Hyperthermia Neg Hx         Review of Systems   Constitutional:  Negative for appetite change, chills, diaphoresis, fatigue, fever and unexpected weight change.   HENT:  Negative for hearing loss, sore throat and trouble swallowing.    Respiratory:  Negative for cough, chest tightness, shortness of breath and wheezing.    Cardiovascular:  Negative for chest pain, palpitations and leg swelling.   Gastrointestinal:  Negative for abdominal distention, abdominal pain, constipation, diarrhea, nausea and vomiting.   Genitourinary:  Positive for menstrual problem (Irregular). Negative for dysuria, frequency, hematuria and urgency.   Musculoskeletal:  Negative for joint swelling.        No muscle weakness.   Skin:  Negative for rash and wound.   Neurological:  Negative for seizures, syncope, speech difficulty, weakness, numbness and headaches.   Hematological:  Negative for adenopathy. Does not bruise/bleed easily.   Psychiatric/Behavioral:  Negative for behavioral problems, confusion and suicidal ideas.    All other systems reviewed and are negative.   I have reviewed and confirmed the accuracy of the ROS as documented by the MA/LPN/RN Sonal Ventura MD        Objective     Vitals:    11/27/23 1045   BP: 127/85   Pulse: 75   Resp: 16   Temp: 98 °F (36.7 °C)   TempSrc: Infrared   SpO2: 98%   Weight: 65.9 kg (145 lb 4.8 oz)   Height: 165.1 cm (65\")   PainSc: 0-No pain           11/27/2023    10:48 AM   Current Status   ECOG score 0       Physical Exam        CONSTITUTIONAL:  Vital signs reviewed.  Alert and oriented x3  No distress, looks comfortable.  RESPIRATORY:  Normal respiratory effort.  No rales  or wheezing, clear.   BREAST: Deferred as patient had recent breast exam by Herb on October 31, 2023 and had a negative exam.  Patient checks herself every month   CARDIOVASCULAR:  Regular rate and rhythm, no murmur  No " significant lower extremity edema.  Abdomen: Soft nontender positive bowel sounds no hepatosplenomegaly  SKIN: No wounds.  No rashes.  MUSCULOSKELETAL/EXTREMITIES: No clubbing or cyanosis.  No apparent unilateral weakness.  NEURO: CN 2-12 appear intact. No focal neurological deficits noted.  PSYCHIATRIC:  Normal judgment and insight.  Normal mood and affect.  I have reexamined the patient and the results are consistent with the previously documented exam. Sonal Ventura MD       RECENT LABS:  Hematology WBC   Date Value Ref Range Status   11/27/2023 5.40 3.40 - 10.80 10*3/mm3 Final   09/12/2023 5.32 4.5 - 11.0 10*3/uL Final     RBC   Date Value Ref Range Status   11/27/2023 4.66 3.77 - 5.28 10*6/mm3 Final   09/12/2023 4.60 4.0 - 5.2 10*6/uL Final     Hemoglobin   Date Value Ref Range Status   11/27/2023 14.8 12.0 - 15.9 g/dL Final   09/12/2023 14.4 12.0 - 16.0 g/dL Final     Hematocrit   Date Value Ref Range Status   11/27/2023 43.5 34.0 - 46.6 % Final   09/12/2023 42.7 36.0 - 46.0 % Final     Platelets   Date Value Ref Range Status   11/27/2023 240 140 - 450 10*3/mm3 Final   09/12/2023 208 140 - 440 10*3/uL Final          Assessment & Plan     *Tis N0, stage 0 right breast DCIS, 3 mm, nuclear grade 2, no invasive carcinoma lymphovascular space invasion seen, %, [place] 99%, Ki-67 5% s/p right partial mastectomy and left breast excisional biopsy with oncoplastic closure on January 18, 2022.  Final pathology on the left was benign and there was no residual disease found in the right breast at surgery.  Patient is eligible for chemoprophylaxis with tamoxifen as she is premenopausal  Discussed the side effects of endocrine therapy  Tamoxifen causes hot flashes, rare chance for uterine cancer, blood clots, DVT, PE, hair thinning, rare chance for thrombocytopenia, cataracts.  Aromatase inhibitors can cause arthralgias, hot flashes, decrease in bone density, rare chance for diarrhea, also discussed about hot flashes  with it.  Evista is approved for osteopenia and can also be used for prophylaxis with fewer side effects except hot flashes and rare chance for cataracts but it can improve bone density.   As patient is premenopausal will go ahead and start her on tamoxifen  Patient has follow-up with radiation oncology Dr. Beatris Ugarte  Patient discussed with radiation oncology Dr. Beatris Kimbrough and decided not to take radiation  February 2022: Started tamoxifen and tolerating well  September 28, 2022: Patient tolerating tamoxifen very well without any hot flashes.  Patient has irregular menstrual periods likely secondary to being menopausal.  Patient seen by Dr. Rebeka Trinh OB/GYN who was not concerned and did not ultrasound which was negative.  May 8, 2023: Patient underwent bilateral diagnostic mammogram April 4, 2023 which is negative but a 6-month follow-up is suggested.  It is already been scheduled for bilateral diagnostic mammogram and ultrasound in 6 months.  October 17, 2023: Bilateral diagnostic mammogram done at [facility] is negative  November 27, 2023: Patient continued tamoxifen without stopping as she was found to have irritable bowel syndrome and saw gastroenterology.  When seen by Herb in October 2023 she was asked to hold off tamoxifen and readdress the issue with me.  But because she does not have any GI issues of nausea or abdominal cramping which was related to her IBS she continue taking it.  Even otherwise I have asked her to see OB/GYN since she had a irregular menstrual period's and that could be because she is perimenopausal but given that she is on tamoxifen I encouraged her to see OB/GYN as opposed to midwife.    *Irregular menstrual periods  Likely secondary to becoming perimenopausal    *New onset nausea and constipation.  Patient has appointment with GI on May 30.  She had a colonoscopy 5 years ago at age 43 which showed precancerous cells and she is to repeat colonoscopy again  as it has been 5 years.  Given new onset nausea we discussed about holding tamoxifen for 4 weeks and seeing if that is the cause for her nausea though I doubt it.  However she will require upper endoscopy as well    Plan  No further irregular menstrual period's  However we will refer patient to OB/[facility]N at [facility] since she is on tamoxifen and needs to be evaluated by uterine ultrasound given that she had irregular menstrual period prior to that  Continue tamoxifen 20 mg daily  Follow-up with me in 6 months with labs  Once patient is seen by OB/GYN she is going to notify us if any abnormality.      Monitoring high risk medication  MD Dr. Enedina Jorge Dr.

## 2023-11-30 RX ORDER — TAMOXIFEN CITRATE 20 MG/1
20 TABLET ORAL DAILY
Qty: 90 TABLET | Refills: 1 | Status: SHIPPED | OUTPATIENT
Start: 2023-11-30

## 2023-11-30 NOTE — TELEPHONE ENCOUNTER
Caller: Ara Diamond    Relationship: Self    Best call back number: [phone]    Requested Prescriptions:   Requested Prescriptions     Pending Prescriptions Disp Refills    tamoxifen (NOLVADEX) 20 MG chemo tablet 90 tablet 1     Sig: Take 1 tablet by mouth Daily.        [id number] [address] AT [place] [address] & [address] - [phone]  - [phone] FX     Last office visit with prescribing clinician: 11/27/2023   Last telemedicine visit with prescribing clinician: Visit date not found   Next office visit with prescribing clinician: 6/4/2024     Does the patient have less than a 3 day supply:  [x] Yes  [] No    Carey Brody Rep   11/30/23 12:25 EST

## 2023-12-06 ENCOUNTER — TELEPHONE (OUTPATIENT)
Dept: ONCOLOGY | Facility: CLINIC | Age: 48
End: 2023-12-06
Payer: COMMERCIAL

## 2023-12-06 NOTE — TELEPHONE ENCOUNTER
Called the patient to see if she had an OB close to her she wanted to see and she denied this. She asked that we have her seen by us and I sent Richelle a message to fax records and referral to [facility]. She v/u.

## 2023-12-06 NOTE — TELEPHONE ENCOUNTER
Caller: Ara Diamond    Relationship: Self    Best call back number: [phone]    What is the best time to reach you: ANYTIME    Who are you requesting to speak with (clinical staff, provider,  specific staff member): CLINICAL        What was the call regarding:     WAS IN FEW WEEKS AGO TO SEE DR MORENO    AND SHE WAS REFERRING HER TO OBGYN FOR UTERINE ULTRASOUND BUT HAS STILL NOT HEARD BACK REGARDING BEING SCHEDULED FOR THIS CALLING TO FOLLOW UP ON STATUS OF THIS.     Is it okay if the provider responds through [facility]: YES

## 2023-12-07 ENCOUNTER — TELEPHONE (OUTPATIENT)
Dept: ORTHOPEDIC SURGERY | Facility: CLINIC | Age: 48
End: 2023-12-07

## 2023-12-07 NOTE — TELEPHONE ENCOUNTER
Caller: Ara Diamond    Relationship to patient: Self    Best call back number:     Chief complaint: LEFT SHOULDER    Type of visit: KENALOG INJECTION     Requested date: ASAP     If rescheduling, when is the original appointment: 1/22/23     Additional notes:PATIENT WOULD LIKE TO BE SEEN SOONER IF POSSIBLE

## 2023-12-11 ENCOUNTER — OFFICE (AMBULATORY)
Dept: URBAN - METROPOLITAN AREA CLINIC 64 | Facility: CLINIC | Age: 48
End: 2023-12-11

## 2023-12-11 VITALS
WEIGHT: 144 LBS | HEIGHT: 64 IN | HEART RATE: 74 BPM | SYSTOLIC BLOOD PRESSURE: 94 MMHG | DIASTOLIC BLOOD PRESSURE: 75 MMHG

## 2023-12-11 DIAGNOSIS — K59.00 CONSTIPATION, UNSPECIFIED: ICD-10-CM

## 2023-12-11 DIAGNOSIS — R11.0 NAUSEA: ICD-10-CM

## 2023-12-11 PROCEDURE — 99213 OFFICE O/P EST LOW 20 MIN: CPT | Performed by: NURSE PRACTITIONER

## 2023-12-11 RX ORDER — ONDANSETRON 4 MG/1
12 TABLET, ORALLY DISINTEGRATING ORAL
Qty: 45 | Refills: 4 | Status: ACTIVE
Start: 2023-12-11

## 2023-12-18 ENCOUNTER — OFFICE VISIT (OUTPATIENT)
Dept: ORTHOPEDIC SURGERY | Facility: CLINIC | Age: 48
End: 2023-12-18
Payer: COMMERCIAL

## 2023-12-18 VITALS — WEIGHT: 145 LBS | HEART RATE: 75 BPM | OXYGEN SATURATION: 98 % | HEIGHT: 65 IN | BODY MASS INDEX: 24.16 KG/M2

## 2023-12-18 DIAGNOSIS — G89.29 CHRONIC LEFT SHOULDER PAIN: Primary | ICD-10-CM

## 2023-12-18 DIAGNOSIS — M25.512 CHRONIC LEFT SHOULDER PAIN: Primary | ICD-10-CM

## 2023-12-18 RX ORDER — ONDANSETRON 4 MG/1
45 TABLET, ORALLY DISINTEGRATING ORAL
COMMUNITY
Start: 2023-12-11

## 2023-12-18 RX ORDER — TRIAMCINOLONE ACETONIDE 40 MG/ML
40 INJECTION, SUSPENSION INTRA-ARTICULAR; INTRAMUSCULAR ONCE
Status: COMPLETED | OUTPATIENT
Start: 2023-12-18 | End: 2023-12-18

## 2023-12-18 RX ADMIN — TRIAMCINOLONE ACETONIDE 40 MG: 40 INJECTION, SUSPENSION INTRA-ARTICULAR; INTRAMUSCULAR at 03:30

## 2023-12-18 NOTE — PROGRESS NOTES
"     Patient ID: Ara Diamond is a 48 y.o. female.    Left shoulder pain  Continued left shoulder pain had a bursa injection in March of this year that lasted until this month  Review of Systems:        Objective:    Pulse 75   Ht 165.1 cm (65\")   Wt 65.8 kg (145 lb)   SpO2 98%   BMI 24.13 kg/m²     Physical Examination:   Left shoulder demonstrates intact skin with no swelling.  No redness.  Passive elevation 180 abduction 150 external rotation 40 internal rotation L5 with no pain and no weakness on Speed Henderson supraspinatus testing with positive impingement sign      Sensory and motor exam are intact all distributions. Radial pulse is palpable and capillary refill is less than two seconds to all digits.  Imaging:   left Shoulder X-Ray  Indication: Shoulder pain denies trauma  AP Y and Lateral views  Findings: Well-maintained joint spaces  no bony lesion  Soft tissues normal  normal joint spaces  Hardware appropriately positioned not applicable      yes prior studies available for comparison.    Assessment:    Left shoulder bursitis    Plan:   Further options discussed she would like to proceed with repeat injection, I recommend injection after todays evaluation.  Risks and benefits were discussed. Under sterile technique and after timeout and verbal consent I injected 40 mg of Kenalog and 1 mL of 1% Lidocaine plain into the subacromial space. It was well tolerated.  See me as needed      Procedures          Disclaimer: Part of this note may be an electronic transcription/translation of spoken language to printed text using the Dragon Dictation System  "

## 2024-04-29 ENCOUNTER — TELEPHONE (OUTPATIENT)
Dept: ONCOLOGY | Facility: CLINIC | Age: 49
End: 2024-04-29
Payer: COMMERCIAL

## 2024-04-29 NOTE — TELEPHONE ENCOUNTER
Spoke with pt and rescheduled appt due to pt going on vacation. Pt confirmed new appt date and time

## 2024-04-29 NOTE — TELEPHONE ENCOUNTER
Caller: Ara Diamond    Relationship to patient: Self    Best call back number: [phone]    Type of visit: LAB AND F/U     Requested date: 6/17 OR AFTER - CALL PATIENT TO SCHEDULE    If rescheduling, when is the original appointment: 6/4

## 2024-06-17 ENCOUNTER — LAB (OUTPATIENT)
Dept: LAB | Facility: HOSPITAL | Age: 49
End: 2024-06-17
Payer: COMMERCIAL

## 2024-06-17 ENCOUNTER — OFFICE VISIT (OUTPATIENT)
Dept: ONCOLOGY | Facility: CLINIC | Age: 49
End: 2024-06-17
Payer: COMMERCIAL

## 2024-06-17 VITALS
HEIGHT: 65 IN | HEART RATE: 83 BPM | OXYGEN SATURATION: 97 % | DIASTOLIC BLOOD PRESSURE: 83 MMHG | BODY MASS INDEX: 24.61 KG/M2 | RESPIRATION RATE: 16 BRPM | TEMPERATURE: 98.1 F | SYSTOLIC BLOOD PRESSURE: 120 MMHG | WEIGHT: 147.7 LBS

## 2024-06-17 DIAGNOSIS — D05.10 DUCTAL CARCINOMA IN SITU (DCIS) OF BREAST, UNSPECIFIED LATERALITY: ICD-10-CM

## 2024-06-17 DIAGNOSIS — D05.10 DUCTAL CARCINOMA IN SITU (DCIS) OF BREAST, UNSPECIFIED LATERALITY: Primary | ICD-10-CM

## 2024-06-17 LAB
ALBUMIN SERPL-MCNC: 3.9 G/DL (ref 3.5–5.2)
ALBUMIN/GLOB SERPL: 1.3 G/DL
ALP SERPL-CCNC: 63 U/L (ref 39–117)
ALT SERPL W P-5'-P-CCNC: 8 U/L (ref 1–33)
ANION GAP SERPL CALCULATED.3IONS-SCNC: 8.7 MMOL/L (ref 5–15)
AST SERPL-CCNC: 24 U/L (ref 1–32)
BASOPHILS # BLD AUTO: 0.07 10*3/MM3 (ref 0–0.2)
BASOPHILS NFR BLD AUTO: 0.9 % (ref 0–1.5)
BILIRUB SERPL-MCNC: <0.2 MG/DL (ref 0–1.2)
BUN SERPL-MCNC: 12 MG/DL (ref 6–20)
BUN/CREAT SERPL: 16 (ref 7–25)
CALCIUM SPEC-SCNC: 8.9 MG/DL (ref 8.6–10.5)
CHLORIDE SERPL-SCNC: 108 MMOL/L (ref 98–107)
CO2 SERPL-SCNC: 25.3 MMOL/L (ref 22–29)
CREAT SERPL-MCNC: 0.75 MG/DL (ref 0.57–1)
DEPRECATED RDW RBC AUTO: 42.8 FL (ref 37–54)
EGFRCR SERPLBLD CKD-EPI 2021: 96.5 ML/MIN/1.73
EOSINOPHIL # BLD AUTO: 0.24 10*3/MM3 (ref 0–0.4)
EOSINOPHIL NFR BLD AUTO: 3.2 % (ref 0.3–6.2)
ERYTHROCYTE [DISTWIDTH] IN BLOOD BY AUTOMATED COUNT: 12.4 % (ref 12.3–15.4)
GLOBULIN UR ELPH-MCNC: 3.1 GM/DL
GLUCOSE SERPL-MCNC: 74 MG/DL (ref 65–99)
HCT VFR BLD AUTO: 40 % (ref 34–46.6)
HGB BLD-MCNC: 14.1 G/DL (ref 12–15.9)
IMM GRANULOCYTES # BLD AUTO: 0.02 10*3/MM3 (ref 0–0.05)
IMM GRANULOCYTES NFR BLD AUTO: 0.3 % (ref 0–0.5)
LYMPHOCYTES # BLD AUTO: 3.71 10*3/MM3 (ref 0.7–3.1)
LYMPHOCYTES NFR BLD AUTO: 49.1 % (ref 19.6–45.3)
MCH RBC QN AUTO: 32.8 PG (ref 26.6–33)
MCHC RBC AUTO-ENTMCNC: 35.3 G/DL (ref 31.5–35.7)
MCV RBC AUTO: 93 FL (ref 79–97)
MONOCYTES # BLD AUTO: 0.5 10*3/MM3 (ref 0.1–0.9)
MONOCYTES NFR BLD AUTO: 6.6 % (ref 5–12)
NEUTROPHILS NFR BLD AUTO: 3.01 10*3/MM3 (ref 1.7–7)
NEUTROPHILS NFR BLD AUTO: 39.9 % (ref 42.7–76)
NRBC BLD AUTO-RTO: 0 /100 WBC (ref 0–0.2)
PLATELET # BLD AUTO: 208 10*3/MM3 (ref 140–450)
PMV BLD AUTO: 9.4 FL (ref 6–12)
POTASSIUM SERPL-SCNC: 4.8 MMOL/L (ref 3.5–5.2)
PROT SERPL-MCNC: 7 G/DL (ref 6–8.5)
RBC # BLD AUTO: 4.3 10*6/MM3 (ref 3.77–5.28)
SODIUM SERPL-SCNC: 142 MMOL/L (ref 136–145)
WBC NRBC COR # BLD AUTO: 7.55 10*3/MM3 (ref 3.4–10.8)

## 2024-06-17 PROCEDURE — 80053 COMPREHEN METABOLIC PANEL: CPT

## 2024-06-17 PROCEDURE — 36415 COLL VENOUS BLD VENIPUNCTURE: CPT

## 2024-06-17 PROCEDURE — 85025 COMPLETE CBC W/AUTO DIFF WBC: CPT

## 2024-06-17 PROCEDURE — 99215 OFFICE O/P EST HI 40 MIN: CPT | Performed by: INTERNAL MEDICINE

## 2024-06-17 RX ORDER — TOPIRAMATE 25 MG/1
25 TABLET ORAL
COMMUNITY
Start: 2024-05-30 | End: 2025-05-30

## 2024-06-17 RX ORDER — MAGNESIUM OXIDE 400 MG/1
400 TABLET ORAL DAILY
COMMUNITY
Start: 2024-05-30 | End: 2025-05-30

## 2024-06-17 NOTE — PROGRESS NOTES
Subjective     REASON FOR follow-up:     1. 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  Final pathology on the left was benign and there was no residual disease found in the right breast at surgery.    Started tamoxifen 2022    2.  Family history of breast cancer in mother at age 68 and currently doing well.  Met maternal grandmother with ovarian cancer at age 68 and  within a year of ovarian cancer maternal great grandmother with pancreatic cancer in the 70s  Genetic testing negative        History of Present Illness     Patient is a 47-year-old female with a history of DCIS currently on tamoxifen and tolerating well.  She denies any complaints.  She has not lost weight.  She has got a good appetite.  She denies any hot flashes.  She did go to Dr. Trinh, [facility] in [place] and had ultrasound which was negative.  She has had irregular menstrual periods likely because she is getting into menopause.  Her gynecologist was not concerned.  We will try to get records from her gynecologist in [place].    Interval history patient states she has been having nausea.  She went to her primary care physician who put her on Prilosec.  Patient has been on tamoxifen for more than a year and has not had nausea so I doubt if that is the case.  Patient also is complaining of constipation.  She had a colonoscopy at age 43 and it had shown precancerous cells and she was due to have a repeat colonoscopy in 5 years.  Patient has appointment with GI on May 30.  She will discuss with him about getting a colonoscopy and EGD.    We discussed about holding tamoxifen for 4 weeks to see if tamoxifen was the drug causing her nausea and she is willing to consider that.  I doubt if that is the case.   However we will give her a break for 4 weeks and restart if there is no change.  Patient had a mammogram April 4, 2023 right diagnostic mammogram the breast is heterogeneously dense.  There is lumpectomy changes in the mid upper central aspect of the right breast.  There are scattered calcifications near the lumpectomy bed.  However there are no suspicious clusters of calcifications.  There are again noted to be mastopexy changes in the right breast.  There is a noted a 1.2 cm asymmetry superficial to the lumpectomy bed best seen on the MLO view.  This is likely postoperative change from either the lumpectomy and or the mastopexy.  Probably benign interval follow-up is suggested.  Bilateral diagnostic mammogram is suggested in 6 months.    November 27, 2023: Patient states that she had irregular menstrual period's likely because she was becoming perimenopausal.  Also she went to a midwife who did not do an ultrasound of the uterus.  We discussed that given that she is on tamoxifen best option would be to refer her to gynecologist and obtain an ultrasound of the uterus since we want to make sure there is no underlying abnormality in the uterus and to rule out uterine cancer.  However we had asked her to hold tamoxifen for 4 weeks when we saw her last 6 months ago because of the nausea and abdominal discomfort and diarrhea.  However patient did not stop it because when she did go to her GI doctor they found that she had IBS and currently is under control.  So she continued her tamoxifen.  She saw Herb recently who asked her to hold tamoxifen and see me.  But patient did not hold it as she has no symptoms related to tamoxifen like hot flashes.  No swelling of the feet no chest pain or shortness of breath.  She had the last menstrual period was December 2022 and given that it was irregular we had asked her to see her GYN.  We discussed her that she needs to see OB/GYN or midwife.    Interval history:  June 17, 2024:  Patient had bilateral diagnostic mammogram on October 17th 2000 23 was stable.  Patient receives mammograms at [facility] and her next one is due in October 2024.  Currently she is on tamoxifen    Oncologic history:  Patient is a 47-year-old female who had been recently diagnosed with right breast ductal carcinoma in situ.  This was detected on a routine screening mammogram.  There is no family history of breast cancer but there is family history of ovarian cancer in maternal grandmother and pancreatic cancer in her maternal great-grandmother.  Patient is referred here for further options of treatment.    Details are as follows    September 13, 2021: Screening mammogram    There is a group of calcifications within the upper outer quadrant of the right breast, no other calcifications are clear architectural distortion or mass is identified    September 24, 2021: Right breast diagnostic mammogram and ultrasound    Calcifications in the upper aspect of the right breast are present.  The calcifications demonstrate amorphous appearance on the CC projection.  On the 90 degree view the majority of the calcifications show evidence of layering indicating milk or calcium as a benign entity.  However there is a small cluster of calcification noted in the posterior upper outer aspect of the right breast.  The cluster of calcifications does not demonstrate evidence for layering.  These indicate a suspicious cluster of calcifications.  No suspicious masses or architectural distortions are seen    October 20, 2021: Right breast stereotactic biopsy done at [facility]  Right breast stereotactic biopsy  1.  Ductal carcinoma in situ, cribriform type  2.  Nuclear grade 2-3  Maximum dimension of DCIS is 0.3 cm  4.  Central comedonecrosis and calcifications are present  5.  No invasive carcinoma or lymphovascular space invasion is identified.  6.  The nonneoplastic breast shows fibrous mastopathy, apocrine hyperplasia  and mild duct ectasia.    Comment-2 portions of tissue showed ductal carcinoma in situ, cribriform type with central necrosis and calcifications  ER: 100%  TX: 99%  Ki-67 5%    November 29, 2021: Bilateral breast MRI  IMPRESSION AND RECOMMENDATION:     1.  A 2.2 cm biopsy cavity/tract at 11:00 in the right breast represents  the site of biopsy-proven malignancy. Surgical management is recommended  with preoperative localization targeting the biopsy clip and residual  calcifications noted lateral to the biopsy clip on the post biopsy  mammogram. These have been marked on the post biopsy mammogram.  2.  Suspicious 0.9 cm nonmass enhancement at 6:00 in the right breast.  Recommend further evaluation with MRI guided core needle biopsy.  3.  Suspicious 1.1 cm enhancing mass at 3:00 in the left breast.  Recommend further evaluation with MRI guided core needle biopsy.       12/2/2021: Genetic testing  INVITAE genetic test 47 genes negative    December 15, 2021: Bilateral breast MR guided biopsy  1.  Right breast 6 o'clock position, stereotactic biopsy for non-mass enhancement  A.  Benign breast parenchyma with fibroadenomatoid hyperplasia, columnar cell hyperplasia and stromal fibrosis    B.  No atypical hyperplasia, in situ or invasive carcinoma identified    2.  Left breast 3 o'clock position stereotactic biopsy for a mass  A.  Still sclerotic intraductal papilloma  B.  No atypical hyperplasia, in situ or invasive carcinoma identified    January 18, 2022: Left breast needle localized excisional biopsy and right needle localized partial mastectomy with oncoplastic closure    Final Diagnosis   1.  Breast, Left, Lumpectomy (3 grams):                A.  Focal sclerosis suggesting possible minimal residual sclerotic papilloma identified adjacent to         barbell shaped clip and biopsy site change.   B.  No evidence of atypia, in situ nor infiltrating carcinoma.       2.  Breast, Right, Additional Superior, Lateral and  Posterior Margins, Inked and Oriented Excision:                A.  Benign breast parenchyma.     3.  Breast, Right Additional Inferior and Medial Margins, Inked and oriented Excision:                A.  Benign breast parenchyma.      4.  Breast, Right Lumpectomy (3 grams):                A.  Amboy tie clip with biopsy site change identified.                 B.  No evidence of atypia, in situ nor infiltrating carcinoma.       5.  Breast, Left, Reduction Mammoplasty (56 grams):                 A.  Benign skin, subcutaneous tissue and breast parenchyma with duct ectasia, apocrine metaplasia,         columnar cell change, sclerosing adenosis and mild ductal hyperplasia.    B.  No evidence atypia, in situ nor infiltrating carcinoma.      6.  Breast, Right Reduction Mammoplasty, (38 grams).                   A.  Benign skin, subcutaneous tissue and breast parenchyma and focal mild duct ectasia, apocrine         metaplasia, and columnar cell change.      mec/brb     Patient is here for further evaluation and treatment.  Patient has appointment with Dr. Beatris Ugarte on February 9, 2022    Patient seen by Dr. Beatris Ugarte and she elected not to proceed with radiation    February 12, 2022: Started tamoxifen      Past Medical History:   Diagnosis Date    Anxiety     Breast cancer     Depression     Ductal carcinoma in situ (DCIS) of right breast 2021    Migraine     Seasonal allergies         Past Surgical History:   Procedure Laterality Date    ABDOMINOPLASTY  2016    BREAST LUMPECTOMY Right 01/18/2022    Procedure: Right needle-localized partial mastectomy,Left breast needle-localized excisional biopsy;  Surgeon: Enedina Stovall MD;  Location: [facility];  Service: General;  Laterality: Right;    BREAST SURGERY Bilateral 01/18/2022    Procedure: RIGHT BREAST ONCOPLASTIC RECONSTRUCTION WITH SYMMETRY PROCEDURE;  Surgeon: Nestor Zapata MD;  Location: [facility];  Service: Plastics;  Laterality: Bilateral;     COLONOSCOPY      LAPAROSCOPIC CHOLECYSTECTOMY      OTHER SURGICAL HISTORY      Arm Surgery (Skin Removal)        Current Outpatient Medications on File Prior to Visit   Medication Sig Dispense Refill    acyclovir (ZOVIRAX) 400 MG tablet TAKE 1 TABLET BY MOUTH TWICE DAILY FOR 5 DAYS AS NEEDED      albuterol sulfate  (90 Base) MCG/ACT inhaler Inhale 2 puffs Every 4 (Four) Hours As Needed for Shortness of Air (cough, wheezing). 8.5 g 0    cetirizine (zyrTEC) 10 MG tablet Daily As Needed for Allergies.      escitalopram (LEXAPRO) 20 MG tablet Take 1 tablet by mouth Daily.      fluticasone (FLONASE) 50 MCG/ACT nasal spray SHAKE LIQUID AND USE 1 SPRAY IN EACH NOSTRIL DAILY FOR 4 DAYS      hydrocortisone 2.5 % ointment APPLY TO EYELIDS EVERY NIGHT AT BEDTIME 3 TO 4 TIMES DAILY AS NEEDED FOR FLARES      hydrOXYzine (ATARAX) 10 MG tablet TK 1/2 TO 1 T PO Q 6 H PRA      ibuprofen (ADVIL,MOTRIN) 800 MG tablet Take 1 tablet by mouth Every 6 (Six) Hours As Needed. for pain      lubiprostone (AMITIZA) 24 MCG capsule Take 1 capsule by mouth 2 (Two) Times a Day With Meals.      meclizine (ANTIVERT) 25 MG tablet TAKE 1 TABLET BY MOUTH THREE TIMES DAILY FOR UP TO 20 DAYS AS NEEDED FOR DIZZINESS      Omega-3 Fatty Acids (FISH OIL PO) Take  by mouth.      ondansetron (ZOFRAN) 4 MG tablet Take 1 tablet by mouth Every 8 (Eight) Hours As Needed. for nausea      ondansetron ODT (ZOFRAN-ODT) 4 MG disintegrating tablet 45 tablets.      promethazine (PHENERGAN) 25 MG tablet Take 1 tablet by mouth Every 6 (Six) Hours As Needed.      tamoxifen (NOLVADEX) 20 MG chemo tablet Take 1 tablet by mouth Daily. 90 tablet 1    tretinoin (RETIN-A) 0.025 % cream APPLY PEA SIZED AMOUNT TOPICALLY TO FACE EVERY NIGHT      triamcinolone (KENALOG) 0.1 % paste As Needed.      Vitamin D, Cholecalciferol, (CHOLECALCIFEROL) 10 MCG (400 UNIT) tablet Take 1 tablet by mouth Daily.      ZOLMitriptan (ZOMIG) 5 MG tablet Take 1 tablet by mouth As Needed for  Migraine.       No current facility-administered medications on file prior to visit.        ALLERGIES:    Allergies   Allergen Reactions    Penicillins Nausea Only        Social History     Socioeconomic History    Marital status:      Spouse name: Addison    Number of children: 2   Tobacco Use    Smoking status: Former     Current packs/day: 0.00     Average packs/day: 1 pack/day for 15.0 years (15.0 ttl pk-yrs)     Types: Cigarettes     Start date: 2000     Quit date: 2015     Years since quittin.4     Passive exposure: Past    Smokeless tobacco: Never   Vaping Use    Vaping status: Never Used   Substance and Sexual Activity    Alcohol use: No    Drug use: No    Sexual activity: Yes     Partners: Male     Birth control/protection: Tubal ligation        Family History   Problem Relation Age of Onset    Ovarian cancer Maternal Grandmother 64    Pancreatic cancer Maternal Great-Grandmother     Breast cancer Mother     Cancer Mother     Malig Hyperthermia Neg Hx         Review of Systems   Constitutional:  Negative for appetite change, chills, diaphoresis, fatigue, fever and unexpected weight change.   HENT:  Negative for hearing loss, sore throat and trouble swallowing.    Respiratory:  Negative for cough, chest tightness, shortness of breath and wheezing.    Cardiovascular:  Negative for chest pain, palpitations and leg swelling.   Gastrointestinal:  Negative for abdominal distention, abdominal pain, constipation, diarrhea, nausea and vomiting.   Genitourinary:  Positive for menstrual problem (Irregular). Negative for dysuria, frequency, hematuria and urgency.   Musculoskeletal:  Negative for joint swelling.        No muscle weakness.   Skin:  Negative for rash and wound.   Neurological:  Negative for seizures, syncope, speech difficulty, weakness, numbness and headaches.   Hematological:  Negative for adenopathy. Does not bruise/bleed easily.   Psychiatric/Behavioral:  Negative for behavioral  problems, confusion and suicidal ideas.    All other systems reviewed and are negative.   I have reviewed and confirmed the accuracy of the ROS as documented by the MA/LPN/RN Sonal Ventura MD        Objective     There were no vitals filed for this visit.          11/27/2023    10:48 AM   Current Status   ECOG score 0       Physical Exam        CONSTITUTIONAL:  Vital signs reviewed.  Alert and oriented x3  No distress, looks comfortable.  RESPIRATORY:  Normal respiratory effort.  No rales  or wheezing, clear.   BREAST: Deferred as patient had recent breast exam by Herb on October 31, 2023 and had a negative exam.  Patient checks herself every month   CARDIOVASCULAR:  Regular rate and rhythm, no murmur  No significant lower extremity edema.  Abdomen: Soft nontender positive bowel sounds no hepatosplenomegaly  SKIN: No wounds.  No rashes.  MUSCULOSKELETAL/EXTREMITIES: No clubbing or cyanosis.  No apparent unilateral weakness.  NEURO: CN 2-12 appear intact. No focal neurological deficits noted.  PSYCHIATRIC:  Normal judgment and insight.  Normal mood and affect.  I have reexamined the patient and the results are consistent with the previously documented exam. Sonal Ventura MD       RECENT LABS:  Hematology WBC   Date Value Ref Range Status   11/27/2023 5.40 3.40 - 10.80 10*3/mm3 Final   09/12/2023 5.32 4.5 - 11.0 10*3/uL Final     RBC   Date Value Ref Range Status   11/27/2023 4.66 3.77 - 5.28 10*6/mm3 Final   09/12/2023 4.60 4.0 - 5.2 10*6/uL Final     Hemoglobin   Date Value Ref Range Status   11/27/2023 14.8 12.0 - 15.9 g/dL Final   09/12/2023 14.4 12.0 - 16.0 g/dL Final     Hematocrit   Date Value Ref Range Status   11/27/2023 43.5 34.0 - 46.6 % Final   09/12/2023 42.7 36.0 - 46.0 % Final     Platelets   Date Value Ref Range Status   11/27/2023 240 140 - 450 10*3/mm3 Final   09/12/2023 208 140 - 440 10*3/uL Final          Assessment & Plan     *Tis N0, stage 0 right breast DCIS, 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  Final pathology on the left was benign and there was no residual disease found in the right breast at surgery.  Patient is eligible for chemoprophylaxis with tamoxifen as she is premenopausal  Discussed the side effects of endocrine therapy  Tamoxifen causes hot flashes, rare chance for uterine cancer, blood clots, DVT, PE, hair thinning, rare chance for thrombocytopenia, cataracts.  Aromatase inhibitors can cause arthralgias, hot flashes, decrease in bone density, rare chance for diarrhea, also discussed about hot flashes with it.  Evista is approved for osteopenia and can also be used for prophylaxis with fewer side effects except hot flashes and rare chance for cataracts but it can improve bone density.   As patient is premenopausal will go ahead and start her on tamoxifen  Patient has follow-up with radiation oncology Dr. Beatris Ugarte  Patient discussed with radiation oncology Dr. Beatris Kimbrough and decided not to take radiation  February 2022: Started tamoxifen and tolerating well  September 28, 2022: Patient tolerating tamoxifen very well without any hot flashes.  Patient has irregular menstrual periods likely secondary to being menopausal.  Patient seen by Dr. Rebeka Trinh OB/[facility] who was not concerned and did not ultrasound which was negative.  May 8, 2023: Patient underwent bilateral diagnostic mammogram April 4, 2023 which is negative but a 6-month follow-up is suggested.  It is already been scheduled for bilateral diagnostic mammogram and ultrasound in 6 months.  October 17, 2023: Bilateral diagnostic mammogram done at [facility] is negative  November 27, 2023: Patient continued tamoxifen without stopping as she was found to have irritable bowel syndrome and saw gastroenterology.  When seen by Herb  in October 2023 she was asked to hold off tamoxifen and readdress the issue with me.  But because she does not have any GI issues of nausea or abdominal cramping which was related to her IBS she continue taking it.  Even otherwise I have asked her to see OB/GYN since she had a irregular menstrual period's and that could be because she is perimenopausal but given that she is on tamoxifen I encouraged her to see OB/GYN as opposed to midwife.  June 17 2024: Patient had continued left shoulder pain and underwent bursa injection in March 2024.  Orthopedic recommended surgery versus PRP versus steroid injection Kenalog 40 mg injection was given.  Reviewed mammogram from October 2023 at [facility] which is negative.  Patient also saw Dr. Trinh from [facility] and has had uterine thickening but not concerning according to patient.  She has handed before starting tamoxifen and thought to be secondary to menstrual period's.  She did have a Pap smear which was negative.  Patient does not have any irregular menstrual period now but likely going towards menopause.  Patient had a Pap smear which was negative but Dr. Reyes did not think she needed any uterine biopsy.  Per patient she was not concerned about uterine cancer    *Irregular menstrual periods  Likely secondary to becoming perimenopausal  Patient now saw Dr. Trinh at [facility] and underwent Pap smear which was negative  On ultrasound she had a large right ovary for which she is being followed closely for years  She also was thought to have some uterine thickening likely secondary to menstrual period's but Dr. Trinh apparently did not require to do uterine biopsy.  Today I have left a message for her to call me for Dr. Trinh to call    *New onset nausea and constipation.  Patient has appointment with GI on May 30.  She had a colonoscopy 5 years ago at age 43 which showed precancerous cells and she is to repeat colonoscopy again as it has been 5  years.  Given new onset nausea we discussed about holding tamoxifen for 4 weeks and seeing if that is the cause for her nausea though I doubt it.  However she will require upper endoscopy as well    *. Family h/o breast ca in mom at 68  , did not require chemo. Grandma with ovarian ca and  in 1 year of diagnosis. Great grandma on maternal side with pancreatic ca in [id number]  Genetic testing negative for 47 genes    Plan  Continue tamoxifen 20 mg daily.  Patient is tolerating well  Patient seen by OB/GYN Dr. Trinh and will discuss with Dr. Trinh regarding questionable uterine thickening  Reviewed mammogram from 2023 which is negative, patient is scheduled for mammogram 2024  Will obtain records from Dr. Trinh's office and left message for her to call me  Follow-up with Dr. Mckeon in 4 months with labs    Monitoring high risk medication  MD Dr. Enedina Jorge Dr., Dr   Complex decision making regarding in setting of advanced illness/malignancy.  - Pt would like home hospice to be with family.  Pt is undocumented and requires charitable hospice care.  - Work with SW to assist with dispo planning once pt's acute symptoms are well controlled.  - Continue to address questions and provide emotional support.

## 2024-06-17 NOTE — PROGRESS NOTES
Subjective     REASON FOR follow-up:     1. 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  Final pathology on the left was benign and there was no residual disease found in the right breast at surgery.    Started tamoxifen February 12, 2022      History of Present Illness     Patient is a 47-year-old female with a history of DCIS currently on tamoxifen and tolerating well.  She denies any complaints.  She has not lost weight.  She has got a good appetite.  She denies any hot flashes.  She did go to Dr. Trinh, Dr[facility] in [place] and had ultrasound which was negative.  She has had irregular menstrual periods likely because she is getting into menopause.  Her gynecologist was not concerned.  We will try to get records from her gynecologist in [place].    Interval history patient states she has been having nausea.  She went to her primary care physician who put her on Prilosec.  Patient has been on tamoxifen for more than a year and has not had nausea so I doubt if that is the case.  Patient also is complaining of constipation.  She had a colonoscopy at age 43 and it had shown precancerous cells and she was due to have a repeat colonoscopy in 5 years.  Patient has appointment with GI on May 30.  She will discuss with him about getting a colonoscopy and EGD.    We discussed about holding tamoxifen for 4 weeks to see if tamoxifen was the drug causing her nausea and she is willing to consider that.  I doubt if that is the case.  However we will give her a break for 4 weeks and restart if there is no change.  Patient had a mammogram April 4, 2023 right diagnostic mammogram the breast is heterogeneously dense.  There is lumpectomy changes in the mid upper central aspect of the right breast.  There are  scattered calcifications near the lumpectomy bed.  However there are no suspicious clusters of calcifications.  There are again noted to be mastopexy changes in the right breast.  There is a noted a 1.2 cm asymmetry superficial to the lumpectomy bed best seen on the MLO view.  This is likely postoperative change from either the lumpectomy and or the mastopexy.  Probably benign interval follow-up is suggested.  Bilateral diagnostic mammogram is suggested in 6 months.    November 27, 2023: Patient states that she had irregular menstrual period's likely because she was becoming perimenopausal.  Also she went to a midwife who did not do an ultrasound of the uterus.  We discussed that given that she is on tamoxifen best option would be to refer her to gynecologist and obtain an ultrasound of the uterus since we want to make sure there is no underlying abnormality in the uterus and to rule out uterine cancer.  However we had asked her to hold tamoxifen for 4 weeks when we saw her last 6 months ago because of the nausea and abdominal discomfort and diarrhea.  However patient did not stop it because when she did go to her GI doctor they found that she had IBS and currently is under control.  So she continued her tamoxifen.  She saw Herb recently who asked her to hold tamoxifen and see me.  But patient did not hold it as she has no symptoms related to tamoxifen like hot flashes.  No swelling of the feet no chest pain or shortness of breath.  She had the last menstrual period was December 2022 and given that it was irregular we had asked her to see her GYN.  We discussed her that she needs to see OB/GYN or midwife.    Oncologic history:  Patient is a 47-year-old female who had been recently diagnosed with right breast ductal carcinoma in situ.  This was detected on a routine screening mammogram.  There is no family history of breast cancer but there is family history of ovarian cancer in maternal grandmother and pancreatic  cancer in her maternal great-grandmother.  Patient is referred here for further options of treatment.    Details are as follows    September 13, 2021: Screening mammogram    There is a group of calcifications within the upper outer quadrant of the right breast, no other calcifications are clear architectural distortion or mass is identified    September 24, 2021: Right breast diagnostic mammogram and ultrasound    Calcifications in the upper aspect of the right breast are present.  The calcifications demonstrate amorphous appearance on the CC projection.  On the 90 degree view the majority of the calcifications show evidence of layering indicating milk or calcium as a benign entity.  However there is a small cluster of calcification noted in the posterior upper outer aspect of the right breast.  The cluster of calcifications does not demonstrate evidence for layering.  These indicate a suspicious cluster of calcifications.  No suspicious masses or architectural distortions are seen    October 20, 2021: Right breast stereotactic biopsy done at [facility]  Right breast stereotactic biopsy  1.  Ductal carcinoma in situ, cribriform type  2.  Nuclear grade 2-3  Maximum dimension of DCIS is 0.3 cm  4.  Central comedonecrosis and calcifications are present  5.  No invasive carcinoma or lymphovascular space invasion is identified.  6.  The nonneoplastic breast shows fibrous mastopathy, apocrine hyperplasia and mild duct ectasia.    Comment-2 portions of tissue showed ductal carcinoma in situ, cribriform type with central necrosis and calcifications  ER: 100%  ID: 99%  Ki-67 5%    November 29, 2021: Bilateral breast MRI  IMPRESSION AND RECOMMENDATION:     1.  A 2.2 cm biopsy cavity/tract at 11:00 in the right breast represents  the site of biopsy-proven malignancy. Surgical management is recommended  with preoperative localization targeting the biopsy clip and residual  calcifications noted lateral to the biopsy  clip on the post biopsy  mammogram. These have been marked on the post biopsy mammogram.  2.  Suspicious 0.9 cm nonmass enhancement at 6:00 in the right breast.  Recommend further evaluation with MRI guided core needle biopsy.  3.  Suspicious 1.1 cm enhancing mass at 3:00 in the left breast.  Recommend further evaluation with MRI guided core needle biopsy.       12/2/2021: Genetic testing  INVITAE genetic test 47 genes negative    December 15, 2021: Bilateral breast MR guided biopsy  1.  Right breast 6 o'clock position, stereotactic biopsy for non-mass enhancement  A.  Benign breast parenchyma with fibroadenomatoid hyperplasia, columnar cell hyperplasia and stromal fibrosis    B.  No atypical hyperplasia, in situ or invasive carcinoma identified    2.  Left breast 3 o'clock position stereotactic biopsy for a mass  A.  Still sclerotic intraductal papilloma  B.  No atypical hyperplasia, in situ or invasive carcinoma identified    January 18, 2022: Left breast needle localized excisional biopsy and right needle localized partial mastectomy with oncoplastic closure    Final Diagnosis   1.  Breast, Left, Lumpectomy (3 grams):                A.  Focal sclerosis suggesting possible minimal residual sclerotic papilloma identified adjacent to         barbell shaped clip and biopsy site change.   B.  No evidence of atypia, in situ nor infiltrating carcinoma.       2.  Breast, Right, Additional Superior, Lateral and Posterior Margins, Inked and Oriented Excision:                A.  Benign breast parenchyma.     3.  Breast, Right Additional Inferior and Medial Margins, Inked and oriented Excision:                A.  Benign breast parenchyma.      4.  Breast, Right Lumpectomy (3 grams):                A.  [place] tie clip with biopsy site change identified.                 B.  No evidence of atypia, in situ nor infiltrating carcinoma.       5.  Breast, Left, Reduction Mammoplasty (56 grams):                 A.  Benign skin,  subcutaneous tissue and breast parenchyma with duct ectasia, apocrine metaplasia,         columnar cell change, sclerosing adenosis and mild ductal hyperplasia.    B.  No evidence atypia, in situ nor infiltrating carcinoma.      6.  Breast, Right Reduction Mammoplasty, (38 grams).                   A.  Benign skin, subcutaneous tissue and breast parenchyma and focal mild duct ectasia, apocrine         metaplasia, and columnar cell change.      mec/brb     Patient is here for further evaluation and treatment.  Patient has appointment with Dr. Beatris Ugarte on February 9, 2022    Patient seen by Dr. Beatris Ugarte and she elected not to proceed with radiation    February 12, 2022: Started tamoxifen      Past Medical History:   Diagnosis Date    Anxiety     Breast cancer     Depression     Ductal carcinoma in situ (DCIS) of right breast 2021    Migraine     Seasonal allergies         Past Surgical History:   Procedure Laterality Date    ABDOMINOPLASTY  2016    BREAST LUMPECTOMY Right 01/18/2022    Procedure: Right needle-localized partial mastectomy,Left breast needle-localized excisional biopsy;  Surgeon: Enedina Stovall MD;  Location: [facility];  Service: General;  Laterality: Right;    BREAST SURGERY Bilateral 01/18/2022    Procedure: RIGHT BREAST ONCOPLASTIC RECONSTRUCTION WITH SYMMETRY PROCEDURE;  Surgeon: Nestor Zapata MD;  Location: [facility];  Service: Plastics;  Laterality: Bilateral;    COLONOSCOPY      LAPAROSCOPIC CHOLECYSTECTOMY      OTHER SURGICAL HISTORY      Arm Surgery (Skin Removal)        Current Outpatient Medications on File Prior to Visit   Medication Sig Dispense Refill    acyclovir (ZOVIRAX) 400 MG tablet TAKE 1 TABLET BY MOUTH TWICE DAILY FOR 5 DAYS AS NEEDED      albuterol sulfate  (90 Base) MCG/ACT inhaler Inhale 2 puffs Every 4 (Four) Hours As Needed for Shortness of Air (cough, wheezing). 8.5 g 0    cetirizine (zyrTEC) 10 MG tablet Daily As Needed for Allergies.       escitalopram (LEXAPRO) 20 MG tablet Take 1 tablet by mouth Daily.      hydrocortisone 2.5 % ointment APPLY TO EYELIDS EVERY NIGHT AT BEDTIME 3 TO 4 TIMES DAILY AS NEEDED FOR FLARES      hydrOXYzine (ATARAX) 10 MG tablet TK 1/2 TO 1 T PO Q 6 H PRA      ibuprofen (ADVIL,MOTRIN) 800 MG tablet Take 1 tablet by mouth Every 6 (Six) Hours As Needed. for pain      lubiprostone (AMITIZA) 24 MCG capsule Take 1 capsule by mouth 2 (Two) Times a Day With Meals.      magnesium oxide (MAG-OX) 400 MG tablet Take 1 tablet by mouth Daily.      meclizine (ANTIVERT) 25 MG tablet TAKE 1 TABLET BY MOUTH THREE TIMES DAILY FOR UP TO 20 DAYS AS NEEDED FOR DIZZINESS      Omega-3 Fatty Acids (FISH OIL PO) Take  by mouth.      ondansetron (ZOFRAN) 4 MG tablet Take 1 tablet by mouth Every 8 (Eight) Hours As Needed. for nausea      ondansetron ODT (ZOFRAN-ODT) 4 MG disintegrating tablet 45 tablets.      promethazine (PHENERGAN) 25 MG tablet Take 1 tablet by mouth Every 6 (Six) Hours As Needed.      tamoxifen (NOLVADEX) 20 MG chemo tablet Take 1 tablet by mouth Daily. 90 tablet 1    topiramate (TOPAMAX) 25 MG tablet Take 1 tablet by mouth.      triamcinolone (KENALOG) 0.1 % paste As Needed.      Vitamin D, Cholecalciferol, (CHOLECALCIFEROL) 10 MCG (400 UNIT) tablet Take 1 tablet by mouth Daily.      ZOLMitriptan (ZOMIG) 5 MG tablet Take 1 tablet by mouth As Needed for Migraine.      tretinoin (RETIN-A) 0.025 % cream APPLY PEA SIZED AMOUNT TOPICALLY TO FACE EVERY NIGHT (Patient not taking: Reported on 6/17/2024)      [DISCONTINUED] fluticasone (FLONASE) 50 MCG/ACT nasal spray SHAKE LIQUID AND USE 1 SPRAY IN EACH NOSTRIL DAILY FOR 4 DAYS       No current facility-administered medications on file prior to visit.        ALLERGIES:    Allergies   Allergen Reactions    Penicillins Nausea Only        Social History     Socioeconomic History    Marital status:      Spouse name: Addison    Number of children: 2   Tobacco Use    Smoking status:  Former     Current packs/day: 0.00     Average packs/day: 1 pack/day for 15.0 years (15.0 ttl pk-yrs)     Types: Cigarettes     Start date: 2000     Quit date: 2015     Years since quittin.4     Passive exposure: Past    Smokeless tobacco: Never   Vaping Use    Vaping status: Never Used   Substance and Sexual Activity    Alcohol use: No    Drug use: No    Sexual activity: Yes     Partners: Male     Birth control/protection: Tubal ligation        Family History   Problem Relation Age of Onset    Ovarian cancer Maternal Grandmother 64    Pancreatic cancer Maternal Great-Grandmother     Breast cancer Mother     Cancer Mother     Malig Hyperthermia Neg Hx         Review of Systems   Constitutional:  Negative for appetite change, chills, diaphoresis, fatigue, fever and unexpected weight change.   HENT:  Negative for hearing loss, sore throat and trouble swallowing.    Respiratory:  Negative for cough, chest tightness, shortness of breath and wheezing.    Cardiovascular:  Negative for chest pain, palpitations and leg swelling.   Gastrointestinal:  Negative for abdominal distention, abdominal pain, constipation, diarrhea, nausea and vomiting.   Genitourinary:  Positive for menstrual problem (Irregular). Negative for dysuria, frequency, hematuria and urgency.   Musculoskeletal:  Negative for joint swelling.        No muscle weakness.   Skin:  Negative for rash and wound.   Neurological:  Negative for seizures, syncope, speech difficulty, weakness, numbness and headaches.   Hematological:  Negative for adenopathy. Does not bruise/bleed easily.   Psychiatric/Behavioral:  Negative for behavioral problems, confusion and suicidal ideas.    All other systems reviewed and are negative.   I have reviewed and confirmed the accuracy of the ROS as documented by the MA/LPN/RN Sonal Ventura MD        Objective     Vitals:    24 0853   BP: 120/83   Pulse: 83   Resp: 16   Temp: 98.1 °F (36.7 °C)   TempSrc: Oral   SpO2:  "97%   Weight: 67 kg (147 lb 11.2 oz)   Height: 165 cm (64.96\")   PainSc: 0-No pain           6/17/2024     8:55 AM   Current Status   ECOG score 0       Physical Exam        CONSTITUTIONAL:  Vital signs reviewed.  Alert and oriented x3  No distress, looks comfortable.  RESPIRATORY:  Normal respiratory effort.  No rales  or wheezing, clear.   BREAST: Deferred as patient had recent breast exam by Herb on October 31, 2023 and had a negative exam.  Patient checks herself every month   CARDIOVASCULAR:  Regular rate and rhythm, no murmur  No significant lower extremity edema.  Abdomen: Soft nontender positive bowel sounds no hepatosplenomegaly  SKIN: No wounds.  No rashes.  MUSCULOSKELETAL/EXTREMITIES: No clubbing or cyanosis.  No apparent unilateral weakness.  NEURO: CN 2-12 appear intact. No focal neurological deficits noted.  PSYCHIATRIC:  Normal judgment and insight.  Normal mood and affect.  I have reexamined the patient and the results are consistent with the previously documented exam. Sonal Ventura MD       RECENT LABS:  Hematology WBC   Date Value Ref Range Status   06/17/2024 7.55 3.40 - 10.80 10*3/mm3 Final   09/12/2023 5.32 4.5 - 11.0 10*3/uL Final     RBC   Date Value Ref Range Status   06/17/2024 4.30 3.77 - 5.28 10*6/mm3 Final   09/12/2023 4.60 4.0 - 5.2 10*6/uL Final     Hemoglobin   Date Value Ref Range Status   06/17/2024 14.1 12.0 - 15.9 g/dL Final   09/12/2023 14.4 12.0 - 16.0 g/dL Final     Hematocrit   Date Value Ref Range Status   06/17/2024 40.0 34.0 - 46.6 % Final   09/12/2023 42.7 36.0 - 46.0 % Final     Platelets   Date Value Ref Range Status   06/17/2024 208 140 - 450 10*3/mm3 Final   09/12/2023 208 140 - 440 10*3/uL Final          Assessment & Plan     *Tis N0, stage 0 right breast DCIS, 3 mm, nuclear grade 2, no invasive carcinoma lymphovascular space invasion seen, %, [place] 99%, Ki-67 5% s/p right partial mastectomy and left breast excisional biopsy with oncoplastic closure on January " 18, 2022.  Final pathology on the left was benign and there was no residual disease found in the right breast at surgery.  Patient is eligible for chemoprophylaxis with tamoxifen as she is premenopausal  Discussed the side effects of endocrine therapy  Tamoxifen causes hot flashes, rare chance for uterine cancer, blood clots, DVT, PE, hair thinning, rare chance for thrombocytopenia, cataracts.  Aromatase inhibitors can cause arthralgias, hot flashes, decrease in bone density, rare chance for diarrhea, also discussed about hot flashes with it.  Evista is approved for osteopenia and can also be used for prophylaxis with fewer side effects except hot flashes and rare chance for cataracts but it can improve bone density.   As patient is premenopausal will go ahead and start her on tamoxifen  Patient has follow-up with radiation oncology Dr. Beatris Ugarte  Patient discussed with radiation oncology Dr. Beatris Kimbrough and decided not to take radiation  February 2022: Started tamoxifen and tolerating well  September 28, 2022: Patient tolerating tamoxifen very well without any hot flashes.  Patient has irregular menstrual periods likely secondary to being menopausal.  Patient seen by Dr. Rebeka Trinh [facility] who was not concerned and did not ultrasound which was negative.  May 8, 2023: Patient underwent bilateral diagnostic mammogram April 4, 2023 which is negative but a 6-month follow-up is suggested.  It is already been scheduled for bilateral diagnostic mammogram and ultrasound in 6 months.  October 17, 2023: Bilateral diagnostic mammogram done at [facility] is negative  November 27, 2023: Patient continued tamoxifen without stopping as she was found to have irritable bowel syndrome and saw gastroenterology.  When seen by Herb in October 2023 she was asked to hold off tamoxifen and readdress the issue with me.  But because she does not have any GI issues of nausea or abdominal cramping which was related  to her IBS she continue taking it.  Even otherwise I have asked her to see OB/GYN since she had a irregular menstrual period's and that could be because she is perimenopausal but given that she is on tamoxifen I encouraged her to see OB/GYN as opposed to midwife.    *Irregular menstrual periods  Likely secondary to becoming perimenopausal    *New onset nausea and constipation.  Patient has appointment with GI on May 30.  She had a colonoscopy 5 years ago at age 43 which showed precancerous cells and she is to repeat colonoscopy again as it has been 5 years.  Given new onset nausea we discussed about holding tamoxifen for 4 weeks and seeing if that is the cause for her nausea though I doubt it.  However she will require upper endoscopy as well    Plan  No further irregular menstrual period's  However we will refer patient to [facility] at [facility] since she is on tamoxifen and needs to be evaluated by uterine ultrasound given that she had irregular menstrual period prior to that  Continue tamoxifen 20 mg daily  Follow-up with me in 6 months with labs  Once patient is seen by OB/GYN she is going to notify us if any abnormality.    I spent 40 total minutes, face-to-face, caring for Ara today. Greater than 50% of this time involved counseling and/or coordination of care as documented within this note.      Monitoring high risk medication  MD Dr. Enedina Jorge Dr.

## 2024-06-18 ENCOUNTER — OFFICE (AMBULATORY)
Dept: URBAN - METROPOLITAN AREA CLINIC 64 | Facility: CLINIC | Age: 49
End: 2024-06-18

## 2024-06-18 VITALS
DIASTOLIC BLOOD PRESSURE: 68 MMHG | WEIGHT: 148 LBS | HEIGHT: 64 IN | HEART RATE: 78 BPM | SYSTOLIC BLOOD PRESSURE: 93 MMHG

## 2024-06-18 DIAGNOSIS — K59.00 CONSTIPATION, UNSPECIFIED: ICD-10-CM

## 2024-06-18 DIAGNOSIS — R11.0 NAUSEA: ICD-10-CM

## 2024-06-18 PROCEDURE — 99213 OFFICE O/P EST LOW 20 MIN: CPT | Performed by: NURSE PRACTITIONER

## 2024-06-18 RX ORDER — LUBIPROSTONE 24 UG/1
CAPSULE, GELATIN COATED ORAL
Qty: 60 | Refills: 5 | Status: ACTIVE

## 2024-06-20 RX ORDER — TAMOXIFEN CITRATE 20 MG/1
20 TABLET ORAL DAILY
Qty: 90 TABLET | Refills: 1 | Status: SHIPPED | OUTPATIENT
Start: 2024-06-20

## 2024-08-31 NOTE — PROGRESS NOTES
Subjective     REASON FOR follow-up:     1. 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  Final pathology on the left was benign and there was no residual disease found in the right breast at surgery.    · Started tamoxifen February 12, 2022      History of Present Illness     Patient is a 47-year-old female with a history of DCIS currently on tamoxifen and tolerating well.  She denies any complaints.  She has not lost weight.  She has got a good appetite.  She denies any hot flashes.  She did go to Dr. Trinh, [facility] in [place] and had ultrasound which was negative.  She has had irregular menstrual periods likely because she is getting into menopause.  Her gynecologist was not concerned.  We will try to get records from her gynecologist in [place].    Interval history patient states she has been having nausea.  She went to her primary care physician who put her on Prilosec.  Patient has been on tamoxifen for more than a year and has not had nausea so I doubt if that is the case.  Patient also is complaining of constipation.  She had a colonoscopy at age 43 and it had shown precancerous cells and she was due to have a repeat colonoscopy in 5 years.  Patient has appointment with GI on May 30.  She will discuss with him about getting a colonoscopy and EGD.    We discussed about holding tamoxifen for 4 weeks to see if tamoxifen was the drug causing her nausea and she is willing to consider that.  I doubt if that is the case.  However we will give her a break for 4 weeks and restart if there is no change.  Patient had a mammogram April 4, 2023 right diagnostic mammogram the breast is heterogeneously dense.  There is lumpectomy changes in the mid upper central aspect of the right breast.  There are  scattered calcifications near the lumpectomy bed.  However there are no suspicious clusters of calcifications.  There are again noted to be mastopexy changes in the right breast.  There is a noted a 1.2 cm asymmetry superficial to the lumpectomy bed best seen on the MLO view.  This is likely postoperative change from either the lumpectomy and or the mastopexy.  Probably benign interval follow-up is suggested.  Bilateral diagnostic mammogram is suggested in 6 months.    Oncologic history:  Patient is a 47-year-old female who had been recently diagnosed with right breast ductal carcinoma in situ.  This was detected on a routine screening mammogram.  There is no family history of breast cancer but there is family history of ovarian cancer in maternal grandmother and pancreatic cancer in her maternal great-grandmother.  Patient is referred here for further options of treatment.    Details are as follows    September 13, 2021: Screening mammogram    There is a group of calcifications within the upper outer quadrant of the right breast, no other calcifications are clear architectural distortion or mass is identified    September 24, 2021: Right breast diagnostic mammogram and ultrasound    Calcifications in the upper aspect of the right breast are present.  The calcifications demonstrate amorphous appearance on the CC projection.  On the 90 degree view the majority of the calcifications show evidence of layering indicating milk or calcium as a benign entity.  However there is a small cluster of calcification noted in the posterior upper outer aspect of the right breast.  The cluster of calcifications does not demonstrate evidence for layering.  These indicate a suspicious cluster of calcifications.  No suspicious masses or architectural distortions are seen    October 20, 2021: Right breast stereotactic biopsy done at [facility]  Right breast stereotactic biopsy  1.  Ductal carcinoma in situ, cribriform  type  2.  Nuclear grade 2-3  Maximum dimension of DCIS is 0.3 cm  4.  Central comedonecrosis and calcifications are present  5.  No invasive carcinoma or lymphovascular space invasion is identified.  6.  The nonneoplastic breast shows fibrous mastopathy, apocrine hyperplasia and mild duct ectasia.    Comment-2 portions of tissue showed ductal carcinoma in situ, cribriform type with central necrosis and calcifications  ER: 100%  MD: 99%  Ki-67 5%    November 29, 2021: Bilateral breast MRI  IMPRESSION AND RECOMMENDATION:     1.  A 2.2 cm biopsy cavity/tract at 11:00 in the right breast represents  the site of biopsy-proven malignancy. Surgical management is recommended  with preoperative localization targeting the biopsy clip and residual  calcifications noted lateral to the biopsy clip on the post biopsy  mammogram. These have been marked on the post biopsy mammogram.  2.  Suspicious 0.9 cm nonmass enhancement at 6:00 in the right breast.  Recommend further evaluation with MRI guided core needle biopsy.  3.  Suspicious 1.1 cm enhancing mass at 3:00 in the left breast.  Recommend further evaluation with MRI guided core needle biopsy.       12/2/2021: Genetic testing  INVITAE genetic test 47 genes negative    December 15, 2021: Bilateral breast MR guided biopsy  1.  Right breast 6 o'clock position, stereotactic biopsy for non-mass enhancement  A.  Benign breast parenchyma with fibroadenomatoid hyperplasia, columnar cell hyperplasia and stromal fibrosis    B.  No atypical hyperplasia, in situ or invasive carcinoma identified    2.  Left breast 3 o'clock position stereotactic biopsy for a mass  A.  Still sclerotic intraductal papilloma  B.  No atypical hyperplasia, in situ or invasive carcinoma identified    January 18, 2022: Left breast needle localized excisional biopsy and right needle localized partial mastectomy with oncoplastic closure    Final Diagnosis   1.  Breast, Left, Lumpectomy (3 grams):                A.   Focal sclerosis suggesting possible minimal residual sclerotic papilloma identified adjacent to         barbell shaped clip and biopsy site change.   B.  No evidence of atypia, in situ nor infiltrating carcinoma.       2.  Breast, Right, Additional Superior, Lateral and Posterior Margins, Inked and Oriented Excision:                A.  Benign breast parenchyma.     3.  Breast, Right Additional Inferior and Medial Margins, Inked and oriented Excision:                A.  Benign breast parenchyma.      4.  Breast, Right Lumpectomy (3 grams):                A.  [place] tie clip with biopsy site change identified.                 B.  No evidence of atypia, in situ nor infiltrating carcinoma.       5.  Breast, Left, Reduction Mammoplasty (56 grams):                 A.  Benign skin, subcutaneous tissue and breast parenchyma with duct ectasia, apocrine metaplasia,         columnar cell change, sclerosing adenosis and mild ductal hyperplasia.    B.  No evidence atypia, in situ nor infiltrating carcinoma.      6.  Breast, Right Reduction Mammoplasty, (38 grams).                   A.  Benign skin, subcutaneous tissue and breast parenchyma and focal mild duct ectasia, apocrine         metaplasia, and columnar cell change.      mec/brb     Patient is here for further evaluation and treatment.  Patient has appointment with Dr. Beatris Ugarte on February 9, 2022    Patient seen by Dr. Beatris Ugarte and she elected not to proceed with radiation    February 12, 2022: Started tamoxifen      Past Medical History:   Diagnosis Date   • Anxiety    • Breast cancer    • Depression    • Ductal carcinoma in situ (DCIS) of right breast 2021   • Migraine    • Seasonal allergies         Past Surgical History:   Procedure Laterality Date   • ABDOMINOPLASTY  2016   • BREAST LUMPECTOMY Right 1/18/2022    Procedure: Right needle-localized partial mastectomy,Left breast needle-localized excisional biopsy;  Surgeon: Enedina Stovall MD;  Location:  [facility] MAIN OR;  Service: General;  Laterality: Right;   • BREAST SURGERY Bilateral 1/18/2022    Procedure: RIGHT BREAST ONCOPLASTIC RECONSTRUCTION WITH SYMMETRY PROCEDURE;  Surgeon: Nestor Zapata MD;  Location: [facility] MAIN OR;  Service: Plastics;  Laterality: Bilateral;   • COLONOSCOPY     • LAPAROSCOPIC CHOLECYSTECTOMY     • OTHER SURGICAL HISTORY      Arm Surgery (Skin Removal)        Current Outpatient Medications on File Prior to Visit   Medication Sig Dispense Refill   • acyclovir (ZOVIRAX) 400 MG tablet TAKE 1 TABLET BY MOUTH TWICE DAILY FOR 5 DAYS AS NEEDED     • cetirizine (zyrTEC) 10 MG tablet Daily As Needed for Allergies.     • escitalopram (LEXAPRO) 20 MG tablet Take 1 tablet by mouth Daily.     • hydrocortisone 2.5 % ointment APPLY TO EYELIDS EVERY NIGHT AT BEDTIME 3 TO 4 TIMES DAILY AS NEEDED FOR FLARES     • hydrOXYzine (ATARAX) 10 MG tablet TK 1/2 TO 1 T PO Q 6 H PRA     • ibuprofen (ADVIL,MOTRIN) 800 MG tablet Take 1 tablet by mouth Every 6 (Six) Hours As Needed. for pain     • meclizine (ANTIVERT) 25 MG tablet TAKE 1 TABLET BY MOUTH THREE TIMES DAILY FOR UP TO 20 DAYS AS NEEDED FOR DIZZINESS     • Omega-3 Fatty Acids (FISH OIL PO) Take  by mouth.     • ondansetron (ZOFRAN) 4 MG tablet Take 1 tablet by mouth Every 8 (Eight) Hours As Needed. for nausea     • tamoxifen (NOLVADEX) 20 MG chemo tablet TAKE 1 TABLET BY MOUTH DAILY 90 tablet 1   • tretinoin (RETIN-A) 0.025 % cream APPLY PEA SIZED AMOUNT TOPICALLY TO FACE EVERY NIGHT     • triamcinolone (KENALOG) 0.1 % paste As Needed.     • Vitamin D, Cholecalciferol, (CHOLECALCIFEROL) 10 MCG (400 UNIT) tablet Take 1 tablet by mouth Daily.     • ZOLMitriptan (ZOMIG) 5 MG tablet Take 1 tablet by mouth As Needed for Migraine.       No current facility-administered medications on file prior to visit.        ALLERGIES:    Allergies   Allergen Reactions   • Penicillins Nausea Only        Social History     Socioeconomic History   • Marital status:   "    Spouse name: Addison   • Number of children: 2   Tobacco Use   • Smoking status: Former     Packs/day: 1.00     Years: 15.00     Pack years: 15.00     Types: Cigarettes     Quit date:      Years since quittin.3   • Smokeless tobacco: Never   Vaping Use   • Vaping Use: Never used   Substance and Sexual Activity   • Alcohol use: No   • Drug use: No   • Sexual activity: Defer        Family History   Problem Relation Age of Onset   • Ovarian cancer Maternal Grandmother 64   • Pancreatic cancer Maternal Great-Grandmother    • Breast cancer Mother    • Malig Hyperthermia Neg Hx         Review of Systems   Constitutional: Negative for appetite change, chills, diaphoresis, fatigue, fever and unexpected weight change.   HENT: Negative for hearing loss, sore throat and trouble swallowing.    Respiratory: Negative for cough, chest tightness, shortness of breath and wheezing.    Cardiovascular: Negative for chest pain, palpitations and leg swelling.   Gastrointestinal: Negative for abdominal distention, abdominal pain, constipation, diarrhea, nausea and vomiting.   Genitourinary: Positive for menstrual problem (Irregular). Negative for dysuria, frequency, hematuria and urgency.   Musculoskeletal: Negative for joint swelling.        No muscle weakness.   Skin: Negative for rash and wound.   Neurological: Negative for seizures, syncope, speech difficulty, weakness, numbness and headaches.   Hematological: Negative for adenopathy. Does not bruise/bleed easily.   Psychiatric/Behavioral: Negative for behavioral problems, confusion and suicidal ideas.   All other systems reviewed and are negative.   I have reviewed and confirmed the accuracy of the ROS as documented by the MA/LPN/RN Sonal Ventura MD        Objective     Vitals:    23 1523   BP: 125/84   Pulse: 76   Resp: 16   Temp: 98 °F (36.7 °C)   TempSrc: Temporal   SpO2: 100%   Weight: 65.6 kg (144 lb 11.2 oz)   Height: 165.1 cm (65\")   PainSc:   8   PainLoc: " Head  Comment: Headache         5/8/2023     3:09 PM   Current Status   ECOG score 0       Physical Exam        CONSTITUTIONAL:  Vital signs reviewed.  Alert and oriented x3  No distress, looks comfortable.  RESPIRATORY:  Normal respiratory effort.  No rales  or wheezing, clear.   BREAST: Right breast: No skin changes, no evidence of breast mass, no nipple discharge, no evidence of any right axillary adenopathy or right supraclavicular adenopathy  Left breast: No evidence of any skin changes, no evidence of any left breast mass and no evidence of left nipple discharge as well as no left axillary adenopathy or left supraclavicular adenopathy.  CARDIOVASCULAR:  Regular rate and rhythm, no murmur  No significant lower extremity edema.  Abdomen: Soft nontender positive bowel sounds no hepatosplenomegaly  SKIN: No wounds.  No rashes.  MUSCULOSKELETAL/EXTREMITIES: No clubbing or cyanosis.  No apparent unilateral weakness.  NEURO: CN 2-12 appear intact. No focal neurological deficits noted.  PSYCHIATRIC:  Normal judgment and insight.  Normal mood and affect.  I have reexamined the patient and the results are consistent with the previously documented exam. Sonal Ventura MD       RECENT LABS:  Hematology WBC   Date Value Ref Range Status   05/08/2023 7.45 3.40 - 10.80 10*3/mm3 Final   10/30/2019 5.24 4.5 - 11.0 10*3/uL Final     RBC   Date Value Ref Range Status   05/08/2023 4.64 3.77 - 5.28 10*6/mm3 Final   10/30/2019 4.44 4.0 - 5.2 10*6/uL Final     Hemoglobin   Date Value Ref Range Status   05/08/2023 14.9 12.0 - 15.9 g/dL Final   10/30/2019 14.2 12.0 - 16.0 g/dL Final     Hematocrit   Date Value Ref Range Status   05/08/2023 43.6 34.0 - 46.6 % Final   10/30/2019 43.7 36.0 - 46.0 % Final     Platelets   Date Value Ref Range Status   05/08/2023 215 140 - 450 10*3/mm3 Final   10/30/2019 222 140 - 440 10*3/uL Final          Assessment & Plan     *Tis N0, stage 0 right breast DCIS, 3 mm, nuclear grade 2, no invasive  carcinoma lymphovascular space invasion seen, %, ME 99%, Ki-67 5% s/p right partial mastectomy and left breast excisional biopsy with oncoplastic closure on January 18, 2022.  Final pathology on the left was benign and there was no residual disease found in the right breast at surgery.  · Patient is eligible for chemoprophylaxis with tamoxifen as she is premenopausal  · Discussed the side effects of endocrine therapy  · Tamoxifen causes hot flashes, rare chance for uterine cancer, blood clots, DVT, PE, hair thinning, rare chance for thrombocytopenia, cataracts.  Aromatase inhibitors can cause arthralgias, hot flashes, decrease in bone density, rare chance for diarrhea, also discussed about hot flashes with it.  Evista is approved for osteopenia and can also be used for prophylaxis with fewer side effects except hot flashes and rare chance for cataracts but it can improve bone density.   · As patient is premenopausal will go ahead and start her on tamoxifen  · Patient has follow-up with radiation oncology Dr. Beatris Ugarte  · Patient discussed with radiation oncology Dr. Beatris Kimbrough and decided not to take radiation  · February 2022: Started tamoxifen and tolerating well  · September 28, 2022: Patient tolerating tamoxifen very well without any hot flashes.  Patient has irregular menstrual periods likely secondary to being menopausal.  Patient seen by Dr. Rebeka [facility] who was not concerned and did not ultrasound which was negative.  · May 8, 2023: Patient underwent bilateral diagnostic mammogram April 4, 2023 which is negative but a 6-month follow-up is suggested.  It is already been scheduled for bilateral diagnostic mammogram and ultrasound in 6 months.    *Irregular menstrual periods  · Likely secondary to becoming perimenopausal    *New onset nausea and constipation.  Patient has appointment with GI on May 30.  · She had a colonoscopy 5 years ago at age 43 which showed precancerous cells and she  is to repeat colonoscopy again as it has been 5 years.  · Given new onset nausea we discussed about holding tamoxifen for 4 weeks and seeing if that is the cause for her nausea though I doubt it.  · However she will require upper endoscopy as well    Plan  · Hold tamoxifen for 4 weeks  · Reviewed diagnostic mammogram from April 4, 2023 which is negative and a 6-month follow-up is suggested which is already scheduled  · At 4 weeks patient will see nurse practitioner if there is no change in the nausea then would continue tamoxifen  · Patient has follow-up with GI on May 30 to address EGD and colonoscopy given new onset nausea and constipation.  · Follow-up with me in 6 months with labs.    Monitoring high risk medication  MD Dr. Enedina Jorge Dr.   Negative

## 2024-11-07 NOTE — PROGRESS NOTES
[facility]     Referring Provider: Self Referring     Chief complaint:  Breast cancer follow up visit      HPI:   12/1/21:  Seen by Dr Stovall  Ms. 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 This was initially detected as an imaging abnormality on routine screening. Her work-up is detailed in the oncologic history below. Prior to the biopsy, she denies any breast lumps, pain, skin changes, or nipple discharge. She denies any prior history of abnormal mammograms or breast biopsies. She denies any family history of breast cancer. She has a family history of ovarian cancer in her maternal grandmother and pancreatic cancer in her maternal great grandmother.     12/17/21:  Seen by Dr Stovall  She underwent genetic testing which was negative for mutation. She then underwent bilateral MR-guided biopsies. This was negative on the right and on the left showed an intraductal papilloma (see report details below).     2/3/22:  Seen by Dr Stovall  She underwent right partial mastectomy, and left breast excisional biopsy with oncoplastic closure on 1/18/22. See surgery & pathology details below in oncologic history. She has been recovering well and has no complaints. She was joined today in clinic by her . She gave consent for him to be present during her examination and participate in the discussion.     5/11/22:  She returns today for follow up with some changes in her bilateral breasts since surgery that seem to be scarring.  She was seen by Dr Zapata in 4/22 at which time he felt they were c/w surgical changes, she will follow up with him in 7/22.     Given low risk of recurrence, she prefers not to take radiation.  She is tolerating tamoxifen well.    She was seen by Dr Goldman in 2/22: , I think her risk of local recurrence is extremely low based on the small size of dcis, no residual disease on lumpectomy  specimen therefore widely negative margins and strong estrogen receptor positivity.  According to the [facility] nomogram for DCIS risk of recurrence, her risk of local recurrence at 10years is approximately 10%.  I explained that radiation would reduce the risk in half to approximately 5% at 10 years but no change in overall survival    Her last clinic visit with Dr Ventura was in 4/22:  She has started tamoxifen since February 2022, Tolerating tamoxifen very well    10/17/22:  She returns today for follow up with no breast changes, she is tolerating tamoxifen.  She has stable bursitis in the left shoulder.  Left shoulder pain with steroid injections.  She saw Dr Zapata in 7/22 with stable findings, follow up is scheduled.    Her last clinic visit with Dr Ventura was in 9/22:  Patient tolerating tamoxifen very well without any hot flashes.    Bilateral diagnostic mammogram with tomosynthesis on 10/4/22 was stable with asymmetry in the right breast for which short term follow up is recommended, BiRads 3.  (see full report below)    4/18/2023:  She returns today for follow up with no breast concerns or health changes.  She is tolerating tamoxifen well.  Recently she has been experiencing shoulder discomfort with steroid injections for left shoulder bursitis.    She has not been seen by medical oncology since 9/22.  Follow up is scheduled in the near future.    Right diagnostic mammogram on 4/4/23 was stable, BiRAds 3.  (see full report below)    10/31/23, Interval History:  She returns today for follow up with no breast concerns.  She was recently diagnosed with IBS with constipation after experiencing GI changes.  She takes medications with improvement in symptoms.  She continues to take tamoxifen, did not hold it in 5/23 as suggested by Dr Ventura.    Her last clinic visit with Dr Ventura was in 5/2023:  patient states she has been having nausea.  She went to her primary care physician who put  her on Prilosec.  Patient has been on tamoxifen for more than a year and has not had nausea so I doubt if that is the case.   We discussed about holding tamoxifen for 4 weeks to see if tamoxifen was the drug causing her nausea and she is willing to consider that.  I doubt if that is the case.  However we will give her a break for 4 weeks and restart if there is no change.     Bilateral diagnostic mammogram 10/17/23 was stable, BiRads 2.  (see full report below)    11/8/2024 interval history  Patient presenting to the office today for routine follow-up.  On 10/21/2024 she had a bilateral diagnostic mammogram that returned as BI-RADS 2.  She last saw her oncologist in June and she is back on the tamoxifen and tolerating it well.  She has no new breast complaints or concerns today.      Oncology/Hematology History Overview Note   09/09/2020, Screening MMG with Jose ([facility]):  Heterogeneously dense. No dominant masses, suspicious microcalcifications or architectural distortions are identified in either breast. Stable exam without mammographic signs of malignancy.  BI-RADS 1: Negative.     Ductal carcinoma in situ (DCIS) of breast   9/12/2021 Initial Diagnosis    Ductal carcinoma in situ (DCIS) of right breast     9/13/2021 Imaging    Screening MMG with Jose ([facility]):  Heterogeneously dense. There is a group of calcifications within the upper outer quadrant of the right breast. No additional suspicious calcifications, architectural distortions or mass lesions identified.  BI-RADS 0: Incomplete.     9/24/2021 Imaging    Right Diagnostic MMG with Jose ([facility]):  As noted on the screening mammogram, there are calcifications at the upper aspect of the right breast. The calcifications demonstrate a somewhat amorphous appearance in the CC projection. On the 90 degree view, the majority of the calcifications demonstrate evidence for layering indicating milk of calcium as a  benign entity. However, there is a small cluster of calcifications noted at the posterior upper outer aspect of the right breast. This cluster of calcifications does not demonstrate evidence for layering. The findings indicate a suspicious cluster of calcifications. This cluster of calcifications is marked on the images. No suspicious masses or architectural distortions are observed.  BI-RADS 4: Suspicious.     10/28/2021 Biopsy    Right Breast, Stereotactic Biopsy ([facility]):    Right breast, stereotactic biopsy:  1. Ductal carcinoma in situ, cribriform type.  2. Nuclear grade 2 of 3.  3. Maximal dimension of DCIS is 0.3 cm.  4. Central comedo necrosis and calcifications are present.  5. No invasive carcinoma or lymphovascular invasion is identified.  6. The non-neoplastic breast shows fibrous mastopathy, apocrine hyperplasia and mild duct ectasia.    Comment - Two portions of tissue show ductal carcinoma in situ, cribriform type with central necrosis and calcifications.    ER+ (100%, strong)  UT+ (99%, strong)      [facility] PATHOLOGY REVIEW    1. Right Breast, Stereotactic Biopsy:               A. Ductal carcinoma in situ (DCIS):                            1. Low to intermediate grade cribriform DCIS with focal comedo type necrosis.                            2. DCIS focally present measuring up to at least 3 mm in greatest dimension.                            3. Microcalcification present within DCIS.                            4. Biomarkers ([facility]):                                         Estrogen Receptor: Positive (100%, Douglas 8/8)                                         Progesterone Receptor: Positive (98%, Douglas 7/8)                                         Ki67 ([facility] performed in House) =  5%               B. No invasive carcinoma identified.     Comment: We essentially concur with the pathologist's original submitted diagnosis of intermediate grade cribriform ductal carcinoma  in situ with focal comedo type necrosis and microcalcification.  Immunostain for CK5/6 performed at the submitting institution is reviewed demonstrating an intact myoepithelial layer in areas of concern.  The submitted paraffin block will be used for one H&E recut plus immunostain for Ki-67 reported above.  All slides prepared at the submitting institution in addition to their paraffin block will be returned.  Slides performed in house will be retained on file for comparison with the anticipated resection specimen.  This case was shared in internal consultation with Dr. Rogel, who concurred with the above diagnosis.     11/29/2021 Imaging    Bilateral Breast MRI ([facility]):  RIGHT BREAST:    At 11:00, 5.2 cm posterior to the nipple, there is a 0.7 cm AP dimension, 2.2 cm transverse dimension, 1.7 cm craniocaudal dimension peripherally enhancing biopsy cavity/tract with a central focus of susceptibility from a biopsy clip marking the site of biopsy-proven malignancy. No suspicious mass or nonmass enhancement is identified at the biopsy site.  At 6:00 in the posterior right breast, 6.2 cm posterior to the nipple, there is a 0.9 cm AP dimension, 0.8 cm transverse dimension, 0.5 cm craniocaudal dimension focal area of clumped nonmass enhancement, which  is suspicious.  No suspicious enhancement is identified in the right nipple or chest wall.  The visualized axilla is within normal limits.    LEFT BREAST:    At 3:00 in the anterior left breast, 3.5 cm posterior to the nipple, there is a 1.0 cm AP dimension, 1.1 cm transverse dimension, 0.7 cm craniocaudal dimension enhancing mass with irregular margins, which is suspicious.  No suspicious enhancement is identified in the left nipple or chest wall.  The visualized axilla is within normal limits.   EXTRAMAMMARY FINDINGS:   There are no abnormally enlarged internal mammary chain lymph nodes on either side.  BI-RADS 4: Suspicious.     12/2/2021 Genetic Testing    [employer]  Common Hereditary Cancers Panel (47 genes):    Negative     12/15/2021 Biopsy    Bilateral Breast, MR-Guided Biopsy ([facility]):    1.  Right Breast, 6 o'clock, Stereotatic Biopsies for Non-Mass Enhancement:                A.  Benign breast parenchyma with fibroadenomatoid hyperplasia, columnar cell hyperplasia and         stromal fibrosis.   B.  No atypical hyperplasia, in situ nor invasive carcinoma identified.      2.  Left Breast, 3 o'clock, Stereotatic Biopsies for a Mass:                A.  Sclerotic intraductal papilloma.               B.  No atypical hyperplasia, in situ nor invasive carcinoma identified.       1/18/2022 Surgery    Left breast needle-localized excisional biopsy and right needle-localized partial mastectomy with oncoplastic closure    1.  Breast, Left, Lumpectomy (3 grams):                A.  Focal sclerosis suggesting possible minimal residual sclerotic papilloma identified adjacent to         barbell shaped clip and biopsy site change.   B.  No evidence of atypia, in situ nor infiltrating carcinoma.       2.  Breast, Right, Additional Superior, Lateral and Posterior Margins, Inked and Oriented Excision:                A.  Benign breast parenchyma.     3.  Breast, Right Additional Inferior and Medial Margins, Inked and oriented Excision:                A.  Benign breast parenchyma.      4.  Breast, Right Lumpectomy (3 grams):                A.  Penryn tie clip with biopsy site change identified.                 B.  No evidence of atypia, in situ nor infiltrating carcinoma.       5.  Breast, Left, Reduction Mammoplasty (56 grams):                 A.  Benign skin, subcutaneous tissue and breast parenchyma with duct ectasia, apocrine metaplasia, columnar cell change, sclerosing adenosis and mild ductal hyperplasia.    B.  No evidence atypia, in situ nor infiltrating carcinoma.      6.  Breast, Right Reduction Mammoplasty, (38 grams).                   A.  Benign skin, subcutaneous tissue and breast  parenchyma and focal mild duct ectasia, apocrine         metaplasia, and columnar cell change.      2/8/2022 Cancer Staged    Staging form: Breast, AJCC 8th Edition  - Clinical stage from 2/8/2022: Stage 0 (cTis (DCIS), cN0, cM0, ER+, UT+) - Signed by Sonal Ventura MD on 2/12/2022     10/4/2022 Imaging    Bilateral diagnostic mammogram with tomosynthesis at [facility]  Breasts are heterogeneously dense.  There are now noted to be lumpectomy changes in the mid upper central aspect of the right breast.  There are scattered punctate calcifications near the lumpectomy bed.  However, there are no suspicious clusters of calcifications.  There are now noted to be mastopexy changes in the right breast.  There is a 1.2 cm asymmetry superficial to the lumpectomy bed best seen in the MLO view.  This is likely postoperative changes from either the lumpectomy and/or mastopexy.  There are no suspicious mass lesions, suspicious calcifications or indirect signs of malignancy in the left breast.  There are now noted to be mastopexy changes in the left breast.  Impression:  Probably benign, interval follow-up suggested  BI-RADS 3  Recommendation:  Right diagnostic mammogram in 6 months.     4/4/2023 Imaging    Right diagnostic mammogram with tomosynthesis at [facility]  Breast is heterogeneously dense.  There are lumpectomy changes in the mid upper central aspect of the right breast.  There are scattered punctate calcifications near the lumpectomy bed.  However, there are no suspicious clusters of calcifications.  There are again noted to be mastopexy changes in the right breast.  There is again noted to be a 1.2 cm asymmetry superficial to the lumpectomy bed best seen in the MLO view.  This is likely postoperative changes from either the lumpectomy and/or the mastopexy.  Impression:  Probably benign, interval follow-up suggested  BI-RADS 3  Recommendation:  Bilateral diagnostic mammogram in 6 months      10/17/2023 Imaging    Bilateral diagnostic mammogram with tomosynthesis at [facility]  Findings:  The breasts are heterogeneously dense, which may obscure small masses.  As compared to the prior studies, there is been no change.  There are no suspicious mass lesions, suspicious calcifications or indirect signs of malignancy in either breast.  There are stable lumpectomy changes in the right breast.  There is a stable microclip in the right breast.  Postsurgical changes are noted both breast.  Impression:  Benign findings BI-RADS 2  Recommendation:  Mammogram in 1 year     10/21/2024 Imaging    Bilateral diagnostic mammogram at [facility]  The breasts are heterogeneously dense.  There is stable right breast postsurgical changes.  There is a right breast biopsy clip.  There is no spiculated mass suspicious microcalcifications or unexplained architectural distortion in either breast.  There is no suspicious interval change.  No suspicious mammographic findings.  Impression  BI-RADS 2         Review of Systems:  See interval history.      Medications:    Current Outpatient Medications:     acyclovir (ZOVIRAX) 400 MG tablet, TAKE 1 TABLET BY MOUTH TWICE DAILY FOR 5 DAYS AS NEEDED, Disp: , Rfl:     albuterol sulfate  (90 Base) MCG/ACT inhaler, Inhale 2 puffs Every 4 (Four) Hours As Needed for Shortness of Air (cough, wheezing)., Disp: 8.5 g, Rfl: 0    cetirizine (zyrTEC) 10 MG tablet, Daily As Needed for Allergies., Disp: , Rfl:     escitalopram (LEXAPRO) 20 MG tablet, Take 1 tablet by mouth Daily., Disp: , Rfl:     hydrocortisone 2.5 % ointment, APPLY TO EYELIDS EVERY NIGHT AT BEDTIME 3 TO 4 TIMES DAILY AS NEEDED FOR FLARES, Disp: , Rfl:     hydrOXYzine (ATARAX) 10 MG tablet, TK 1/2 TO 1 T PO Q 6 H PRA, Disp: , Rfl:     ibuprofen (ADVIL,MOTRIN) 800 MG tablet, Take 1 tablet by mouth Every 6 (Six) Hours As Needed. for pain, Disp: , Rfl:     lubiprostone (AMITIZA) 24 MCG capsule, Take 1 capsule by  mouth 2 (Two) Times a Day With Meals., Disp: , Rfl:     magnesium oxide (MAG-OX) 400 MG tablet, Take 1 tablet by mouth Daily., Disp: , Rfl:     meclizine (ANTIVERT) 25 MG tablet, TAKE 1 TABLET BY MOUTH THREE TIMES DAILY FOR UP TO 20 DAYS AS NEEDED FOR DIZZINESS, Disp: , Rfl:     Omega-3 Fatty Acids (FISH OIL PO), Take  by mouth., Disp: , Rfl:     ondansetron (ZOFRAN) 4 MG tablet, Take 1 tablet by mouth Every 8 (Eight) Hours As Needed. for nausea, Disp: , Rfl:     ondansetron ODT (ZOFRAN-ODT) 4 MG disintegrating tablet, 45 tablets., Disp: , Rfl:     promethazine (PHENERGAN) 25 MG tablet, Take 1 tablet by mouth Every 6 (Six) Hours As Needed., Disp: , Rfl:     tamoxifen (NOLVADEX) 20 MG chemo tablet, TAKE 1 TABLET BY MOUTH DAILY, Disp: 90 tablet, Rfl: 1    topiramate (TOPAMAX) 25 MG tablet, Take 1 tablet by mouth., Disp: , Rfl:     triamcinolone (KENALOG) 0.1 % paste, As Needed., Disp: , Rfl:     Vitamin D, Cholecalciferol, (CHOLECALCIFEROL) 10 MCG (400 UNIT) tablet, Take 1 tablet by mouth Daily., Disp: , Rfl:     ZOLMitriptan (ZOMIG) 5 MG tablet, Take 1 tablet by mouth As Needed for Migraine., Disp: , Rfl:       Allergies   Allergen Reactions    Penicillins Nausea Only       Family History   Problem Relation Age of Onset    Ovarian cancer Maternal Grandmother 64    Pancreatic cancer Maternal Great-Grandmother     Breast cancer Mother     Cancer Mother     Malig Hyperthermia Neg Hx        PHYSICAL EXAMINATION:      There were no vitals taken for this visit.      I reviewed physical exam, no changes noted    ECOG 0 - Asymptomatic  General: NAD, well appearing  Psych: a&o x3, normal mood and affect  Eyes: EOMI, no scleral icterus  ENMT: neck supple without masses or thyromegaly, mucous membranes moist  MSK: normal gait, normal ROM in bilateral shoulders  Lymph nodes: no cervical, supraclavicular or axillary lymphadenopathy  Breast: symmetric, moderate size, grade 2 ptosis  Right: Sp mastopexy with well-healed incisions.  Flaps and NAC healthy.  No visible abnormalities on inspection while seated, with arms raised or hands on hips. No masses, skin changes, or nipple abnormalities.    Left: Sp mastopexy with well-healed incisions. There is an area over the lateral NAC with thickening and lighter pigment, but this appears viable.  No visible abnormalities on inspection while seated, with arms raised or hands on hips. No masses, skin changes, or nipple abnormalities.  Mild incision thickening c/w keloid inframmary fold.    Assessment:   1)  49 y.o. F with a diagnosis of a left breast intraductal papilloma and right breast ductal carcinoma in situ (DCIS)10/2021, low to intermediate grade, ER/VA+. She underwent right partial mastectomy and left breast excisional biopsy with oncoplastic closure on 1/18/22 with benign final pathology on the left and no residual disease found in the right breast (3 mm on core), pTis.    2)  Abnormal imaiigng right breast for which short term follow up is recommended, 10/2022, stable findings 10/2023, ending surveillance with routine follow up  1.2 cm asymmetry superficial to the lumpectomy bed    Discussion:  Imaging findings were reviewed, a copy was given.  No further follow up of the right breast asymmetry is recommended, routine follow up recommended.    We discussed her follow up which includes clinical breast exam every 6 months for 2 years (10/2023), with mammogram annually then  mammogram and exam annually until 5 years from diagnosis (10/26).    I advised her to continue with breast massage, demonstrated technique to be done daily.    Plan:  - continue follow up with Dr. Ventura   - continue massage to surgical incisions  - screening mammogram with tomosynthesis in 12 months at [facility] followed by exam  -She was instructed to call sooner with any questions, concerns or changes on BSE.    RAOUL Hernadnez           CC:  RAOUL Torres PA

## 2024-11-08 ENCOUNTER — OFFICE VISIT (OUTPATIENT)
Dept: SURGERY | Facility: CLINIC | Age: 49
End: 2024-11-08
Payer: COMMERCIAL

## 2024-11-08 VITALS
BODY MASS INDEX: 24.49 KG/M2 | OXYGEN SATURATION: 100 % | HEIGHT: 65 IN | WEIGHT: 147 LBS | SYSTOLIC BLOOD PRESSURE: 116 MMHG | DIASTOLIC BLOOD PRESSURE: 78 MMHG | HEART RATE: 79 BPM

## 2024-11-08 DIAGNOSIS — Z12.31 ENCOUNTER FOR SCREENING MAMMOGRAM FOR MALIGNANT NEOPLASM OF BREAST: ICD-10-CM

## 2024-11-08 DIAGNOSIS — D05.11 DUCTAL CARCINOMA IN SITU (DCIS) OF RIGHT BREAST: Primary | ICD-10-CM

## 2024-12-12 ENCOUNTER — OFFICE (AMBULATORY)
Dept: URBAN - METROPOLITAN AREA CLINIC 64 | Facility: CLINIC | Age: 49
End: 2024-12-12

## 2024-12-12 VITALS
SYSTOLIC BLOOD PRESSURE: 122 MMHG | DIASTOLIC BLOOD PRESSURE: 91 MMHG | WEIGHT: 145 LBS | HEIGHT: 64 IN | HEART RATE: 72 BPM

## 2024-12-12 DIAGNOSIS — R11.0 NAUSEA: ICD-10-CM

## 2024-12-12 DIAGNOSIS — K59.00 CONSTIPATION, UNSPECIFIED: ICD-10-CM

## 2024-12-12 DIAGNOSIS — R10.9 UNSPECIFIED ABDOMINAL PAIN: ICD-10-CM

## 2024-12-12 PROCEDURE — 99213 OFFICE O/P EST LOW 20 MIN: CPT | Performed by: NURSE PRACTITIONER

## 2024-12-13 NOTE — PROGRESS NOTES
.     REASONS FOR FOLLOWUP: DCIS    HISTORY OF PRESENT ILLNESS:  The patient is a 49 y.o. year old female  who is here for follow-up with the above-mentioned history.    No new problems.  No trouble tamoxifen.  No hot flashes.  No nausea, SOA, problems eating, weight loss, unexplained areas of pain    Past Medical History:   Diagnosis Date    Anxiety     Breast cancer     Depression     Ductal carcinoma in situ (DCIS) of right breast 2021    Migraine     Seasonal allergies      Past Surgical History:   Procedure Laterality Date    ABDOMINOPLASTY  2016    BREAST LUMPECTOMY Right 01/18/2022    Procedure: Right needle-localized partial mastectomy,Left breast needle-localized excisional biopsy;  Surgeon: Enedina Stovall MD;  Location: [facility];  Service: General;  Laterality: Right;    BREAST SURGERY Bilateral 01/18/2022    Procedure: RIGHT BREAST ONCOPLASTIC RECONSTRUCTION WITH SYMMETRY PROCEDURE;  Surgeon: Nestor Zapata MD;  Location: [facility];  Service: Plastics;  Laterality: Bilateral;    COLONOSCOPY      LAPAROSCOPIC CHOLECYSTECTOMY      OTHER SURGICAL HISTORY      Arm Surgery (Skin Removal)       MEDICATIONS    Current Outpatient Medications:     acyclovir (ZOVIRAX) 400 MG tablet, TAKE 1 TABLET BY MOUTH TWICE DAILY FOR 5 DAYS AS NEEDED, Disp: , Rfl:     albuterol sulfate  (90 Base) MCG/ACT inhaler, Inhale 2 puffs Every 4 (Four) Hours As Needed for Shortness of Air (cough, wheezing)., Disp: 8.5 g, Rfl: 0    cetirizine (zyrTEC) 10 MG tablet, Daily As Needed for Allergies., Disp: , Rfl:     escitalopram (LEXAPRO) 20 MG tablet, Take 1 tablet by mouth Daily., Disp: , Rfl:     hydrocortisone 2.5 % ointment, APPLY TO EYELIDS EVERY NIGHT AT BEDTIME 3 TO 4 TIMES DAILY AS NEEDED FOR FLARES, Disp: , Rfl:     hydrOXYzine (ATARAX) 10 MG tablet, TK 1/2 TO 1 T PO Q 6 H PRA, Disp: , Rfl:     ibuprofen (ADVIL,MOTRIN) 800 MG tablet, Take 1 tablet by mouth Every 6 (Six) Hours As Needed. for pain,  Disp: , Rfl:     lubiprostone (AMITIZA) 24 MCG capsule, Take 1 capsule by mouth 2 (Two) Times a Day With Meals., Disp: , Rfl:     magnesium oxide (MAG-OX) 400 MG tablet, Take 1 tablet by mouth Daily., Disp: , Rfl:     meclizine (ANTIVERT) 25 MG tablet, TAKE 1 TABLET BY MOUTH THREE TIMES DAILY FOR UP TO 20 DAYS AS NEEDED FOR DIZZINESS, Disp: , Rfl:     Omega-3 Fatty Acids (FISH OIL PO), Take  by mouth., Disp: , Rfl:     ondansetron (ZOFRAN) 4 MG tablet, Take 1 tablet by mouth Every 8 (Eight) Hours As Needed. for nausea, Disp: , Rfl:     ondansetron ODT (ZOFRAN-ODT) 4 MG disintegrating tablet, 45 tablets., Disp: , Rfl:     promethazine (PHENERGAN) 25 MG tablet, Take 1 tablet by mouth Every 6 (Six) Hours As Needed., Disp: , Rfl:     tamoxifen (NOLVADEX) 20 MG chemo tablet, TAKE 1 TABLET BY MOUTH DAILY, Disp: 90 tablet, Rfl: 1    triamcinolone (KENALOG) 0.1 % paste, As Needed., Disp: , Rfl:     Vitamin D, Cholecalciferol, (CHOLECALCIFEROL) 10 MCG (400 UNIT) tablet, Take 1 tablet by mouth Daily., Disp: , Rfl:     ZOLMitriptan (ZOMIG) 5 MG tablet, Take 1 tablet by mouth As Needed for Migraine., Disp: , Rfl:     ALLERGIES:     Allergies   Allergen Reactions    Penicillins Nausea Only     Beta lactam allergy details  Antibiotic reaction: (!) swollen tongue, other  Age at reaction: adult  Dose to reaction time: unknown  Reason for antibiotic: unknown  Epinephrine required for reaction?: (!) yes  Tolerated antibiotics: unknown           SOCIAL HISTORY:       Social History     Socioeconomic History    Marital status:      Spouse name: Addison    Number of children: 2   Tobacco Use    Smoking status: Former     Current packs/day: 0.00     Average packs/day: 1 pack/day for 15.0 years (15.0 ttl pk-yrs)     Types: Cigarettes     Start date: 2000     Quit date: 2015     Years since quittin.9     Passive exposure: Past    Smokeless tobacco: Never   Vaping Use    Vaping status: Never Used   Substance and Sexual  Activity    Alcohol use: No    Drug use: No    Sexual activity: Yes     Partners: Male     Birth control/protection: Tubal ligation         FAMILY HISTORY:  Family History   Problem Relation Age of Onset    Ovarian cancer Maternal Grandmother 64    Pancreatic cancer Maternal Great-Grandmother     Breast cancer Mother     Cancer Mother     Malig Hyperthermia Neg Hx        REVIEW OF SYSTEMS:  Review of Systems   Constitutional:  Negative for activity change.   HENT:  Negative for nosebleeds and trouble swallowing.    Respiratory:  Negative for shortness of breath and wheezing.    Cardiovascular:  Negative for chest pain and palpitations.   Gastrointestinal:  Negative for constipation, diarrhea and nausea.   Genitourinary:  Negative for dysuria and hematuria.   Musculoskeletal:  Negative for arthralgias and myalgias.   Skin:  Negative for rash and wound.   Neurological:  Negative for seizures and syncope.   Hematological:  Negative for adenopathy. Does not bruise/bleed easily.   Psychiatric/Behavioral:  Negative for confusion.             There were no vitals filed for this visit.      6/17/2024     8:55 AM   Current Status   ECOG score 0        PHYSICAL EXAM:        CONSTITUTIONAL:  Vital signs reviewed.  No distress, looks comfortable.  EYES:  Conjunctiva and lids unremarkable.  PERRLA  EARS,NOSE,MOUTH,THROAT:  Ears and nose appear unremarkable.  Lips, teeth, gums appear unremarkable.  RESPIRATORY:  Normal respiratory effort.  Lungs clear to auscultation bilaterally.  CARDIOVASCULAR:  Normal S1, S2.  No murmurs rubs or gallops.  No significant lower extremity edema.  BREAST: Declined.  She states she gets breast exams through her OB/GYN  GASTROINTESTINAL: Abdomen appears unremarkable.  Nontender.  No hepatomegaly.  No splenomegaly.  LYMPHATIC:  No cervical, supraclavicular, axillary lymphadenopathy.  SKIN:  Warm.  No rashes.  PSYCHIATRIC:  Normal judgment and insight.  Normal mood and affect.         RECENT LABS:         WBC   Date/Time Value Ref Range Status   06/17/2024 08:40 AM 7.55 3.40 - 10.80 10*3/mm3 Final   09/12/2023 08:04 AM 5.32 4.5 - 11.0 10*3/uL Final     Hemoglobin   Date/Time Value Ref Range Status   06/17/2024 08:40 AM 14.1 12.0 - 15.9 g/dL Final   09/12/2023 08:04 AM 14.4 12.0 - 16.0 g/dL Final     Platelets   Date/Time Value Ref Range Status   06/17/2024 08:40  140 - 450 10*3/mm3 Final   09/12/2023 08:04  140 - 440 10*3/uL Final       Assessment & Plan   There are no diagnoses linked to this encounter.      Ara Diamond   *Right breast DCIS  Tis N0, stage 0 right breast DCIS, 3 mm, nuclear grade 2, no invasive carcinoma lymphovascular space invasion seen, %, [place] 99%, Ki-67 5% s/p right partial mastectomy and left breast excisional biopsy with oncoplastic closure on January 18, 2022.  Final pathology on the left was benign and there was no residual disease found in the right breast at surgery.  Premenopausal at diagnosis.  Met with radiation medicine.  Decision was made for no adjuvant XRT  2/12/2022: Began tamoxifen 20 mg daily  No clinical signs of recurrence.  Tolerating tamoxifen well.  Continue    *Irregular menstrual cycles  Deferred OB/GYN    *Dr. Ventura reported some uterine thickening, but stated Dr. Trinh, OB/GYN did not feel this warranted further evaluation or biopsy   7/23/2024 Dr. Ventura discussed with Dr. Trinh.  She stated since patient had a previous biopsy that was negative.  She stated Dr. Trinh was planning ultrasound and biopsy the next day.  She held tamoxifen follow awaiting biopsy results.  She stated if biopsy negative and no uterine thickening then consider restarting tamoxifen.  No side effects on tamoxifen and patient wanted to remain on tamoxifen.  Still premenopausal.  8/7/2024: Dr. Ventura reported biopsy of uterus on 7/24/2024 with benign pathology.  She stated she discussed with Dr. Trinh again and she was comfortable with the patient continuing  tamoxifen considering 2 biopsies on the uterus were negative.  She stated Dr. Trinh was not concerned about any abnormal endometrial thickening.  Therefore, resumed tamoxifen.  2024: The patient states Dr. Trinh plans to repeat ultrasound in February and if any more thickening likely plans hysterectomy.  Patient states she might ask for hysterectomy also if stable thickening      *5/10/2023: New onset nausea and constipation.  Patient has appointment with GI on May 30.  5/10/2023 Dr. Ventura tried holding tamoxifen for 4 weeks to see if it would help with the nausea     *. Family h/o breast ca in mom at 68  , did not require chemo. Grandma with ovarian ca and  in 1 year of diagnosis. Great grandma on maternal side with pancreatic ca in [id number]  Genetic testing negative for 47 genes     *2024: Initial visit with me (prior patient of Dr. Ventura's)    Plan  Continue tamoxifen 20 mg daily.    Last mammogram 10/21/2024, BI-RADS 2  MD CBC CMP 6 months     For this visit I also reviewed 2024 mammogram which was not ordered by me

## 2024-12-16 ENCOUNTER — OFFICE VISIT (OUTPATIENT)
Dept: ONCOLOGY | Facility: CLINIC | Age: 49
End: 2024-12-16
Payer: COMMERCIAL

## 2024-12-16 ENCOUNTER — LAB (OUTPATIENT)
Dept: LAB | Facility: HOSPITAL | Age: 49
End: 2024-12-16
Payer: COMMERCIAL

## 2024-12-16 VITALS
DIASTOLIC BLOOD PRESSURE: 73 MMHG | BODY MASS INDEX: 24.16 KG/M2 | TEMPERATURE: 98.9 F | WEIGHT: 145 LBS | HEIGHT: 65 IN | SYSTOLIC BLOOD PRESSURE: 108 MMHG | HEART RATE: 82 BPM | OXYGEN SATURATION: 98 %

## 2024-12-16 DIAGNOSIS — D05.10 DUCTAL CARCINOMA IN SITU (DCIS) OF BREAST, UNSPECIFIED LATERALITY: ICD-10-CM

## 2024-12-16 DIAGNOSIS — D05.10 DUCTAL CARCINOMA IN SITU (DCIS) OF BREAST, UNSPECIFIED LATERALITY: Primary | ICD-10-CM

## 2024-12-16 LAB
ALBUMIN SERPL-MCNC: 3.8 G/DL (ref 3.5–5.2)
ALBUMIN/GLOB SERPL: 1.3 G/DL
ALP SERPL-CCNC: 61 U/L (ref 39–117)
ALT SERPL W P-5'-P-CCNC: 9 U/L (ref 1–33)
ANION GAP SERPL CALCULATED.3IONS-SCNC: 8.5 MMOL/L (ref 5–15)
AST SERPL-CCNC: 20 U/L (ref 1–32)
BASOPHILS # BLD AUTO: 0.08 10*3/MM3 (ref 0–0.2)
BASOPHILS NFR BLD AUTO: 0.9 % (ref 0–1.5)
BILIRUB SERPL-MCNC: 0.2 MG/DL (ref 0–1.2)
BUN SERPL-MCNC: 11 MG/DL (ref 6–20)
BUN/CREAT SERPL: 14.5 (ref 7–25)
CALCIUM SPEC-SCNC: 8.6 MG/DL (ref 8.6–10.5)
CHLORIDE SERPL-SCNC: 104 MMOL/L (ref 98–107)
CO2 SERPL-SCNC: 24.5 MMOL/L (ref 22–29)
CREAT SERPL-MCNC: 0.76 MG/DL (ref 0.57–1)
DEPRECATED RDW RBC AUTO: 41.7 FL (ref 37–54)
EGFRCR SERPLBLD CKD-EPI 2021: 95 ML/MIN/1.73
EOSINOPHIL # BLD AUTO: 0.14 10*3/MM3 (ref 0–0.4)
EOSINOPHIL NFR BLD AUTO: 1.7 % (ref 0.3–6.2)
ERYTHROCYTE [DISTWIDTH] IN BLOOD BY AUTOMATED COUNT: 12.2 % (ref 12.3–15.4)
GLOBULIN UR ELPH-MCNC: 2.9 GM/DL
GLUCOSE SERPL-MCNC: 88 MG/DL (ref 65–99)
HCT VFR BLD AUTO: 41.6 % (ref 34–46.6)
HGB BLD-MCNC: 14.3 G/DL (ref 12–15.9)
IMM GRANULOCYTES # BLD AUTO: 0.02 10*3/MM3 (ref 0–0.05)
IMM GRANULOCYTES NFR BLD AUTO: 0.2 % (ref 0–0.5)
LYMPHOCYTES # BLD AUTO: 2.7 10*3/MM3 (ref 0.7–3.1)
LYMPHOCYTES NFR BLD AUTO: 32 % (ref 19.6–45.3)
MCH RBC QN AUTO: 31.9 PG (ref 26.6–33)
MCHC RBC AUTO-ENTMCNC: 34.4 G/DL (ref 31.5–35.7)
MCV RBC AUTO: 92.9 FL (ref 79–97)
MONOCYTES # BLD AUTO: 0.6 10*3/MM3 (ref 0.1–0.9)
MONOCYTES NFR BLD AUTO: 7.1 % (ref 5–12)
NEUTROPHILS NFR BLD AUTO: 4.91 10*3/MM3 (ref 1.7–7)
NEUTROPHILS NFR BLD AUTO: 58.1 % (ref 42.7–76)
NRBC BLD AUTO-RTO: 0 /100 WBC (ref 0–0.2)
PLATELET # BLD AUTO: 237 10*3/MM3 (ref 140–450)
PMV BLD AUTO: 9.8 FL (ref 6–12)
POTASSIUM SERPL-SCNC: 3.8 MMOL/L (ref 3.5–5.2)
PROT SERPL-MCNC: 6.7 G/DL (ref 6–8.5)
RBC # BLD AUTO: 4.48 10*6/MM3 (ref 3.77–5.28)
SODIUM SERPL-SCNC: 137 MMOL/L (ref 136–145)
WBC NRBC COR # BLD AUTO: 8.45 10*3/MM3 (ref 3.4–10.8)

## 2024-12-16 PROCEDURE — 99214 OFFICE O/P EST MOD 30 MIN: CPT | Performed by: INTERNAL MEDICINE

## 2024-12-16 PROCEDURE — 85025 COMPLETE CBC W/AUTO DIFF WBC: CPT

## 2024-12-16 PROCEDURE — 36415 COLL VENOUS BLD VENIPUNCTURE: CPT

## 2024-12-16 PROCEDURE — 80053 COMPREHEN METABOLIC PANEL: CPT

## 2024-12-16 RX ORDER — PRUCALOPRIDE 2 MG/1
TABLET, FILM COATED ORAL
COMMUNITY
Start: 2024-12-12

## 2024-12-16 RX ORDER — DIVALPROEX SODIUM 500 MG/1
500 TABLET, FILM COATED, EXTENDED RELEASE ORAL DAILY
COMMUNITY
Start: 2024-12-09

## 2024-12-26 RX ORDER — TAMOXIFEN CITRATE 20 MG/1
20 TABLET ORAL DAILY
Qty: 90 TABLET | Refills: 1 | Status: SHIPPED | OUTPATIENT
Start: 2024-12-26

## 2024-12-30 ENCOUNTER — OFFICE VISIT (OUTPATIENT)
Dept: ORTHOPEDIC SURGERY | Facility: CLINIC | Age: 49
End: 2024-12-30
Payer: COMMERCIAL

## 2024-12-30 VITALS — HEART RATE: 83 BPM | HEIGHT: 65 IN | WEIGHT: 145 LBS | BODY MASS INDEX: 24.16 KG/M2

## 2024-12-30 DIAGNOSIS — M25.512 ACUTE PAIN OF LEFT SHOULDER: Primary | ICD-10-CM

## 2024-12-30 PROCEDURE — 99213 OFFICE O/P EST LOW 20 MIN: CPT | Performed by: ORTHOPAEDIC SURGERY

## 2024-12-30 PROCEDURE — 20610 DRAIN/INJ JOINT/BURSA W/O US: CPT | Performed by: ORTHOPAEDIC SURGERY

## 2024-12-30 RX ORDER — TRIAMCINOLONE ACETONIDE 40 MG/ML
40 INJECTION, SUSPENSION INTRA-ARTICULAR; INTRAMUSCULAR ONCE
Status: COMPLETED | OUTPATIENT
Start: 2024-12-30 | End: 2024-12-30

## 2024-12-30 RX ADMIN — TRIAMCINOLONE ACETONIDE 40 MG: 40 INJECTION, SUSPENSION INTRA-ARTICULAR; INTRAMUSCULAR at 14:10

## 2024-12-30 NOTE — PROGRESS NOTES
Patient ID: Ara Diamond is a 49 y.o. female.  Left shoulder pain  Follows up for left shoulder pain had a subacromial injection in December 2023  Pain is returned in the last couple weeks or so without injury    Review of Systems:        Objective:    There were no vitals taken for this visit.    Physical Examination:     Left shoulder mild pain impingement area passive elevation 170 abduction 140 external rotation 50 internal rotation L4 with a negative Speed [place] supraspinatus test.  Sensory and motor exam are intact all distributions. Radial pulse is palpable and capillary refill is less than two seconds to all digits.    Imaging:     left Shoulder X-Ray  Indication: Shoulder pain denies trauma  AP Y and Lateral views  Findings: Well-maintained joint spaces no impingement  no bony lesion  Soft tissues normal  normal joint spaces  Hardware appropriately positioned not applicable      yes prior studies available for comparison.  Assessment:    Left shoulder pain returned with bursitis    Plan:   Options again discussed such as MRI PRP versus steroid she elected for steroid injection  I recommend injection after todays evaluation.  Risks and benefits were discussed. Under sterile technique and after timeout and verbal consent I injected 40 mg of Kenalog and 1 mL of 1% Lidocaine plain into the subacromial space. It was well tolerated.  See me as needed      Procedures          Disclaimer: Part of this note may be an electronic transcription/translation of spoken language to printed text using the Dragon Dictation System

## 2025-06-04 ENCOUNTER — OFFICE VISIT (OUTPATIENT)
Dept: ONCOLOGY | Facility: CLINIC | Age: 50
End: 2025-06-04
Payer: COMMERCIAL

## 2025-06-04 ENCOUNTER — LAB (OUTPATIENT)
Dept: LAB | Facility: HOSPITAL | Age: 50
End: 2025-06-04
Payer: COMMERCIAL

## 2025-06-04 VITALS
BODY MASS INDEX: 24.06 KG/M2 | WEIGHT: 144.4 LBS | SYSTOLIC BLOOD PRESSURE: 109 MMHG | DIASTOLIC BLOOD PRESSURE: 75 MMHG | TEMPERATURE: 98.5 F | HEART RATE: 80 BPM | HEIGHT: 65 IN | OXYGEN SATURATION: 98 %

## 2025-06-04 DIAGNOSIS — D05.10 DUCTAL CARCINOMA IN SITU (DCIS) OF BREAST, UNSPECIFIED LATERALITY: Primary | ICD-10-CM

## 2025-06-04 DIAGNOSIS — D05.10 DUCTAL CARCINOMA IN SITU (DCIS) OF BREAST, UNSPECIFIED LATERALITY: ICD-10-CM

## 2025-06-04 LAB
ALBUMIN SERPL-MCNC: 4.1 G/DL (ref 3.5–5.2)
ALBUMIN/GLOB SERPL: 1.5 G/DL
ALP SERPL-CCNC: 63 U/L (ref 39–117)
ALT SERPL W P-5'-P-CCNC: 14 U/L (ref 1–33)
ANION GAP SERPL CALCULATED.3IONS-SCNC: 10.1 MMOL/L (ref 5–15)
AST SERPL-CCNC: 23 U/L (ref 1–32)
BASOPHILS # BLD AUTO: 0.06 10*3/MM3 (ref 0–0.2)
BASOPHILS NFR BLD AUTO: 0.8 % (ref 0–1.5)
BILIRUB SERPL-MCNC: 0.3 MG/DL (ref 0–1.2)
BUN SERPL-MCNC: 10.5 MG/DL (ref 6–20)
BUN/CREAT SERPL: 15.4 (ref 7–25)
CALCIUM SPEC-SCNC: 8.9 MG/DL (ref 8.6–10.5)
CHLORIDE SERPL-SCNC: 104 MMOL/L (ref 98–107)
CO2 SERPL-SCNC: 23.9 MMOL/L (ref 22–29)
CREAT SERPL-MCNC: 0.68 MG/DL (ref 0.57–1)
DEPRECATED RDW RBC AUTO: 42 FL (ref 37–54)
EGFRCR SERPLBLD CKD-EPI 2021: 104.9 ML/MIN/1.73
EOSINOPHIL # BLD AUTO: 0.18 10*3/MM3 (ref 0–0.4)
EOSINOPHIL NFR BLD AUTO: 2.3 % (ref 0.3–6.2)
ERYTHROCYTE [DISTWIDTH] IN BLOOD BY AUTOMATED COUNT: 12 % (ref 12.3–15.4)
GLOBULIN UR ELPH-MCNC: 2.7 GM/DL
GLUCOSE SERPL-MCNC: 89 MG/DL (ref 65–99)
HCT VFR BLD AUTO: 42.6 % (ref 34–46.6)
HGB BLD-MCNC: 14.5 G/DL (ref 12–15.9)
IMM GRANULOCYTES # BLD AUTO: 0.04 10*3/MM3 (ref 0–0.05)
IMM GRANULOCYTES NFR BLD AUTO: 0.5 % (ref 0–0.5)
LYMPHOCYTES # BLD AUTO: 2.91 10*3/MM3 (ref 0.7–3.1)
LYMPHOCYTES NFR BLD AUTO: 36.9 % (ref 19.6–45.3)
MCH RBC QN AUTO: 32.3 PG (ref 26.6–33)
MCHC RBC AUTO-ENTMCNC: 34 G/DL (ref 31.5–35.7)
MCV RBC AUTO: 94.9 FL (ref 79–97)
MONOCYTES # BLD AUTO: 0.6 10*3/MM3 (ref 0.1–0.9)
MONOCYTES NFR BLD AUTO: 7.6 % (ref 5–12)
NEUTROPHILS NFR BLD AUTO: 4.09 10*3/MM3 (ref 1.7–7)
NEUTROPHILS NFR BLD AUTO: 51.9 % (ref 42.7–76)
NRBC BLD AUTO-RTO: 0 /100 WBC (ref 0–0.2)
PLATELET # BLD AUTO: 214 10*3/MM3 (ref 140–450)
PMV BLD AUTO: 10.1 FL (ref 6–12)
POTASSIUM SERPL-SCNC: 3.9 MMOL/L (ref 3.5–5.2)
PROT SERPL-MCNC: 6.8 G/DL (ref 6–8.5)
RBC # BLD AUTO: 4.49 10*6/MM3 (ref 3.77–5.28)
SODIUM SERPL-SCNC: 138 MMOL/L (ref 136–145)
WBC NRBC COR # BLD AUTO: 7.88 10*3/MM3 (ref 3.4–10.8)

## 2025-06-04 PROCEDURE — 99214 OFFICE O/P EST MOD 30 MIN: CPT | Performed by: INTERNAL MEDICINE

## 2025-06-04 PROCEDURE — 85025 COMPLETE CBC W/AUTO DIFF WBC: CPT

## 2025-06-04 PROCEDURE — 80053 COMPREHEN METABOLIC PANEL: CPT

## 2025-06-04 PROCEDURE — 36415 COLL VENOUS BLD VENIPUNCTURE: CPT

## 2025-06-04 RX ORDER — EPINEPHRINE 0.3 MG/.3ML
0.3 INJECTION SUBCUTANEOUS ONCE
COMMUNITY
Start: 2025-04-01

## 2025-06-04 NOTE — PROGRESS NOTES
.     REASONS FOR FOLLOWUP: DCIS    HISTORY OF PRESENT ILLNESS:  The patient is a 50 y.o. year old female  who is here for follow-up with the above-mentioned history.    No new problems.  No hot flashes.  No chest pain or SOA or pain or swelling of 1 leg more than the other.  No nausea.  No trouble eating.  No weight loss or unexplained areas of pain    Past Medical History:   Diagnosis Date    Anxiety     Breast cancer     Depression     Ductal carcinoma in situ (DCIS) of right breast 2021    Migraine     Seasonal allergies      Past Surgical History:   Procedure Laterality Date    ABDOMINOPLASTY  2016    BREAST LUMPECTOMY Right 01/18/2022    Procedure: Right needle-localized partial mastectomy,Left breast needle-localized excisional biopsy;  Surgeon: Enedina Stovall MD;  Location: [facility];  Service: General;  Laterality: Right;    BREAST SURGERY Bilateral 01/18/2022    Procedure: RIGHT BREAST ONCOPLASTIC RECONSTRUCTION WITH SYMMETRY PROCEDURE;  Surgeon: Nestor Zapata MD;  Location: [facility];  Service: Plastics;  Laterality: Bilateral;    COLONOSCOPY      LAPAROSCOPIC CHOLECYSTECTOMY      OTHER SURGICAL HISTORY      Arm Surgery (Skin Removal)       MEDICATIONS    Current Outpatient Medications:     cetirizine (zyrTEC) 10 MG tablet, Daily As Needed for Allergies., Disp: , Rfl:     divalproex (DEPAKOTE ER) 500 MG 24 hr tablet, Take 1 tablet by mouth Daily., Disp: , Rfl:     escitalopram (LEXAPRO) 20 MG tablet, Take 1 tablet by mouth Daily., Disp: , Rfl:     ibuprofen (ADVIL,MOTRIN) 800 MG tablet, Take 1 tablet by mouth Every 6 (Six) Hours As Needed. for pain, Disp: , Rfl:     lubiprostone (AMITIZA) 24 MCG capsule, Take 1 capsule by mouth 2 (Two) Times a Day With Meals., Disp: , Rfl:     Motegrity 2 MG tablet, Take 1 tablet by mouth Daily. PRUCALOPRIDE, Disp: , Rfl:     Omega-3 Fatty Acids (FISH OIL PO), Take  by mouth., Disp: , Rfl:     tamoxifen (NOLVADEX) 20 MG chemo tablet, Take 1 tablet  by mouth Daily., Disp: 90 tablet, Rfl: 1    Vitamin D, Cholecalciferol, (CHOLECALCIFEROL) 10 MCG (400 UNIT) tablet, Take 1 tablet by mouth Daily., Disp: , Rfl:     acyclovir (ZOVIRAX) 400 MG tablet, TAKE 1 TABLET BY MOUTH TWICE DAILY FOR 5 DAYS AS NEEDED, Disp: , Rfl:     albuterol sulfate  (90 Base) MCG/ACT inhaler, Inhale 2 puffs Every 4 (Four) Hours As Needed for Shortness of Air (cough, wheezing)., Disp: 8.5 g, Rfl: 0    EPINEPHrine (EPIPEN) 0.3 MG/0.3ML solution auto-injector injection, Inject 0.3 mL under the skin into the appropriate area as directed 1 (One) Time. (Patient not taking: Reported on 6/4/2025), Disp: , Rfl:     hydrocortisone 2.5 % ointment, APPLY TO EYELIDS EVERY NIGHT AT BEDTIME 3 TO 4 TIMES DAILY AS NEEDED FOR FLARES, Disp: , Rfl:     hydrOXYzine (ATARAX) 10 MG tablet, TK 1/2 TO 1 T PO Q 6 H PRA, Disp: , Rfl:     meclizine (ANTIVERT) 25 MG tablet, TAKE 1 TABLET BY MOUTH THREE TIMES DAILY FOR UP TO 20 DAYS AS NEEDED FOR DIZZINESS, Disp: , Rfl:     ondansetron (ZOFRAN) 4 MG tablet, Take 1 tablet by mouth Every 8 (Eight) Hours As Needed. for nausea, Disp: , Rfl:     ondansetron ODT (ZOFRAN-ODT) 4 MG disintegrating tablet, 45 tablets., Disp: , Rfl:     promethazine (PHENERGAN) 25 MG tablet, Take 1 tablet by mouth Every 6 (Six) Hours As Needed., Disp: , Rfl:     triamcinolone (KENALOG) 0.1 % paste, As Needed., Disp: , Rfl:     ZOLMitriptan (ZOMIG) 5 MG tablet, Take 1 tablet by mouth As Needed for Migraine., Disp: , Rfl:     ALLERGIES:     Allergies   Allergen Reactions    Penicillins Nausea Only     Beta lactam allergy details  Antibiotic reaction: (!) swollen tongue, other  Age at reaction: adult  Dose to reaction time: unknown  Reason for antibiotic: unknown  Epinephrine required for reaction?: (!) yes  Tolerated antibiotics: unknown           SOCIAL HISTORY:       Social History     Socioeconomic History    Marital status:      Spouse name: Addison    Number of children: 2   Tobacco  "Use    Smoking status: Former     Current packs/day: 0.00     Average packs/day: 1 pack/day for 15.0 years (15.0 ttl pk-yrs)     Types: Cigarettes     Start date: 1/1/2000     Quit date: 1/1/2015     Years since quitting: 10.4     Passive exposure: Past    Smokeless tobacco: Never   Vaping Use    Vaping status: Never Used   Substance and Sexual Activity    Alcohol use: No    Drug use: No    Sexual activity: Yes     Partners: Male     Birth control/protection: Tubal ligation         FAMILY HISTORY:  Family History   Problem Relation Age of Onset    Ovarian cancer Maternal Grandmother 64    Pancreatic cancer Maternal Great-Grandmother     Breast cancer Mother     Cancer Mother     Malig Hyperthermia Neg Hx        REVIEW OF SYSTEMS:  Review of Systems   Constitutional:  Negative for activity change.   HENT:  Negative for nosebleeds and trouble swallowing.    Respiratory:  Negative for shortness of breath and wheezing.    Cardiovascular:  Negative for chest pain and palpitations.   Gastrointestinal:  Negative for constipation, diarrhea and nausea.   Genitourinary:  Negative for dysuria and hematuria.   Musculoskeletal:  Negative for arthralgias and myalgias.   Skin:  Negative for rash and wound.   Neurological:  Negative for seizures and syncope.   Hematological:  Negative for adenopathy. Does not bruise/bleed easily.   Psychiatric/Behavioral:  Negative for confusion.             Vitals:    06/04/25 1108   BP: 109/75   Pulse: 80   Temp: 98.5 °F (36.9 °C)   TempSrc: Oral   SpO2: 98%   Weight: 65.5 kg (144 lb 6.4 oz)   Height: 165 cm (64.96\")   PainSc: 0-No pain         6/4/2025    11:14 AM   Current Status   ECOG score 0        PHYSICAL EXAM:        CONSTITUTIONAL:  Vital signs reviewed.  No distress, looks comfortable.  EYES:  Conjunctiva and lids unremarkable.  PERRLA  EARS,NOSE,MOUTH,THROAT:  Ears and nose appear unremarkable.  Lips, teeth, gums appear unremarkable.  RESPIRATORY:  Normal respiratory effort.  Lungs " clear to auscultation bilaterally.  CARDIOVASCULAR:  Normal S1, S2.  No murmurs rubs or gallops.  No significant lower extremity edema.  BREAST: Declined because she gets her breast exams through OB/GYN  GASTROINTESTINAL: Abdomen appears unremarkable.  Nontender.  No hepatomegaly.  No splenomegaly.  LYMPHATIC:  No cervical, supraclavicular, axillary lymphadenopathy.  SKIN:  Warm.  No rashes.  PSYCHIATRIC:  Normal judgment and insight.  Normal mood and affect.        RECENT LABS:        WBC   Date/Time Value Ref Range Status   06/04/2025 11:05 AM 7.88 3.40 - 10.80 10*3/mm3 Final   09/12/2023 08:04 AM 5.32 4.5 - 11.0 10*3/uL Final     Hemoglobin   Date/Time Value Ref Range Status   06/04/2025 11:05 AM 14.5 12.0 - 15.9 g/dL Final   09/12/2023 08:04 AM 14.4 12.0 - 16.0 g/dL Final     Platelets   Date/Time Value Ref Range Status   06/04/2025 11:05  140 - 450 10*3/mm3 Final   09/12/2023 08:04  140 - 440 10*3/uL Final       Assessment & Plan   There are no diagnoses linked to this encounter.      Ara Diamond   *Right breast DCIS  Tis N0, stage 0 right breast DCIS, 3 mm, nuclear grade 2, no invasive carcinoma lymphovascular space invasion seen, %, [place] 99%, Ki-67 5% s/p right partial mastectomy and left breast excisional biopsy with oncoplastic closure on January 18, 2022.  Final pathology on the left was benign and there was no residual disease found in the right breast at surgery.  Premenopausal at diagnosis.  Met with radiation medicine.  Decision was made for no adjuvant XRT  2/12/2022: Began tamoxifen 20 mg daily  No clinical evidence of recurrence.  Tolerating tamoxifen well.  Continue    *Irregular menstrual cycles  Deferred OB/GYN    *Dr. Ventura reported some uterine thickening, but stated Dr. Trinh, OB/GYN did not feel this warranted further evaluation or biopsy   7/23/2024 Dr. Ventura discussed with Dr. Trinh.  She stated since patient had a previous biopsy that was negative.  She  stated Dr. Trinh was planning ultrasound and biopsy the next day.  She held tamoxifen follow awaiting biopsy results.  She stated if biopsy negative and no uterine thickening then consider restarting tamoxifen.  No side effects on tamoxifen and patient wanted to remain on tamoxifen.  Still premenopausal.  2024: Dr. Ventura reported biopsy of uterus on 2024 with benign pathology.  She stated she discussed with Dr. Trinh again and she was comfortable with the patient continuing tamoxifen considering 2 biopsies on the uterus were negative.  She stated Dr. Trinh was not concerned about any abnormal endometrial thickening.  Therefore, resumed tamoxifen.  2024: The patient states Dr. Trinh plans to repeat ultrasound in February and if any more thickening likely plans hysterectomy.  Patient states she might ask for hysterectomy also if stable thickening  Continue to follow-up with OB/GYN      *5/10/2023: New onset nausea and constipation.  Patient has appointment with GI on May 30.  5/10/2023 Dr. Ventura tried holding tamoxifen for 4 weeks to see if it would help with the nausea  No complaints of nausea today     *. Family h/o breast ca in mom at 68  , did not require chemo. Grandma with ovarian ca and  in 1 year of diagnosis. Great grandma on maternal side with pancreatic ca in [id number]  Genetic testing negative for 47 genes     *2024: Initial visit with me (prior patient of Dr. Ventura's)    Plan  Continue tamoxifen 20 mg daily.    Last mammogram 10/21/2024, BI-RADS 2  MD CBC CMP 6 months     For this visit I also reviewed [place] [id number] neurology note and 2024 orthopedic surgery note             No

## 2025-06-12 ENCOUNTER — OFFICE (AMBULATORY)
Dept: URBAN - METROPOLITAN AREA CLINIC 64 | Facility: CLINIC | Age: 50
End: 2025-06-12
Payer: COMMERCIAL

## 2025-06-12 VITALS
WEIGHT: 147 LBS | HEIGHT: 64 IN | DIASTOLIC BLOOD PRESSURE: 88 MMHG | SYSTOLIC BLOOD PRESSURE: 112 MMHG | HEART RATE: 79 BPM

## 2025-06-12 DIAGNOSIS — K59.00 CONSTIPATION, UNSPECIFIED: ICD-10-CM

## 2025-06-12 PROCEDURE — 99212 OFFICE O/P EST SF 10 MIN: CPT | Performed by: NURSE PRACTITIONER

## 2025-06-12 RX ORDER — PRUCALOPRIDE 2 MG/1
2 TABLET, FILM COATED ORAL
Qty: 90 | Refills: 3 | Status: ACTIVE
Start: 2024-07-15

## 2025-06-12 RX ORDER — LUBIPROSTONE 24 UG/1
CAPSULE, GELATIN COATED ORAL
Qty: 180 | Refills: 3 | Status: ACTIVE

## (undated) DEVICE — SUT MNCRYL 3/0 PS2 18IN MCP497G

## (undated) DEVICE — APPL CHLORAPREP HI/LITE 26ML ORNG

## (undated) DEVICE — NDL HYPO ECLPS SFTY 25G 1 1/2IN

## (undated) DEVICE — ANTIBACTERIAL UNDYED BRAIDED (POLYGLACTIN 910), SYNTHETIC ABSORBABLE SUTURE: Brand: COATED VICRYL

## (undated) DEVICE — JACKSON-PRATT 100CC BULB RESERVOIR: Brand: CARDINAL HEALTH

## (undated) DEVICE — PK UNIV COMPL 40

## (undated) DEVICE — ADHS SKIN SURG TISS VISC PREMIERPRO EXOFIN HI/VISC FAST/DRY

## (undated) DEVICE — NDL HYPO ECLPS SFTY 22G 1 1/2IN

## (undated) DEVICE — GOWN,SIRUS,NON REINFRCD,LARGE,SET IN SL: Brand: MEDLINE

## (undated) DEVICE — THERMOGARD PLUS ABC DUAL DISPERSIVE ELECTRODE: Brand: THERMOGARD

## (undated) DEVICE — PATIENT RETURN ELECTRODE, SINGLE-USE, CONTACT QUALITY MONITORING, ADULT, WITH 9FT CORD, FOR PATIENTS WEIGING OVER 33LBS. (15KG): Brand: MEGADYNE

## (undated) DEVICE — EXTRCT STPL SKIN STRL BX/12

## (undated) DEVICE — SUT MNCRYL PLS ANTIB UD 4/0 PS2 18IN

## (undated) DEVICE — PENCL E/S ULTRAVAC TELESCP NOSE HOLSTR 10FT

## (undated) DEVICE — ELECTRD BLD EZ CLN MOD XLNG 2.75IN

## (undated) DEVICE — LUER-LOK 360°: Brand: CONNECTA, LUER-LOK

## (undated) DEVICE — STPLR SKIN VISISTAT WD 35CT

## (undated) DEVICE — GLV SURG SENSICARE POLYISPRN W/ALOE PF LF 6.5 GRN STRL

## (undated) DEVICE — GLV SURG BIOGEL LTX PF 7 1/2

## (undated) DEVICE — MARKR SKIN W/RULR AND LBL

## (undated) DEVICE — BNDG ELAS ELITE V/CLOSE 6IN 5YD LF STRL

## (undated) DEVICE — SYR LL TP 10ML STRL

## (undated) DEVICE — 1010 S-DRAPE TOWEL DRAPE 10/BX: Brand: STERI-DRAPE™

## (undated) DEVICE — GLV SURG SENSICARE PI MIC PF SZ6.5 LF STRL

## (undated) DEVICE — PROXIMATE RH ROTATING HEAD SKIN STAPLERS (35 WIDE) CONTAINS 35 STAINLESS STEEL STAPLES: Brand: PROXIMATE

## (undated) DEVICE — SUT SILK 2/0 SH 30IN K833H

## (undated) DEVICE — INTENDED FOR TISSUE SEPARATION, AND OTHER PROCEDURES THAT REQUIRE A SHARP SURGICAL BLADE TO PUNCTURE OR CUT.: Brand: BARD-PARKER ® CARBON RIB-BACK BLADES

## (undated) DEVICE — TRAP FLD MINIVAC MEGADYNE 100ML

## (undated) DEVICE — BANDAGE,GAUZE,BULKEE II,4.5"X4.1YD,STRL: Brand: MEDLINE

## (undated) DEVICE — LEGGINGS, PAIR, 31X48, STERILE: Brand: MEDLINE

## (undated) DEVICE — DRSNG GZ CURAD XEROFORM NONADHS 5X9IN STRL

## (undated) DEVICE — KT INK TISS MARGINMARKER STD 6COLOR